# Patient Record
Sex: FEMALE | Race: WHITE | Employment: PART TIME | URBAN - METROPOLITAN AREA
[De-identification: names, ages, dates, MRNs, and addresses within clinical notes are randomized per-mention and may not be internally consistent; named-entity substitution may affect disease eponyms.]

---

## 2019-10-19 ENCOUNTER — TELEPHONE (OUTPATIENT)
Dept: FAMILY MEDICINE CLINIC | Age: 44
End: 2019-10-19

## 2019-10-19 ENCOUNTER — OFFICE VISIT (OUTPATIENT)
Dept: FAMILY MEDICINE CLINIC | Age: 44
End: 2019-10-19
Payer: MEDICAID

## 2019-10-19 VITALS
RESPIRATION RATE: 16 BRPM | SYSTOLIC BLOOD PRESSURE: 100 MMHG | TEMPERATURE: 97.7 F | DIASTOLIC BLOOD PRESSURE: 62 MMHG | OXYGEN SATURATION: 99 % | HEIGHT: 69 IN | HEART RATE: 84 BPM | BODY MASS INDEX: 21.66 KG/M2 | WEIGHT: 146.2 LBS

## 2019-10-19 DIAGNOSIS — T36.95XA ANTIBIOTIC-INDUCED YEAST INFECTION: ICD-10-CM

## 2019-10-19 DIAGNOSIS — B37.9 ANTIBIOTIC-INDUCED YEAST INFECTION: ICD-10-CM

## 2019-10-19 DIAGNOSIS — J22 ACUTE LOWER RESPIRATORY TRACT INFECTION: Primary | ICD-10-CM

## 2019-10-19 PROCEDURE — G8484 FLU IMMUNIZE NO ADMIN: HCPCS | Performed by: NURSE PRACTITIONER

## 2019-10-19 PROCEDURE — 99213 OFFICE O/P EST LOW 20 MIN: CPT | Performed by: NURSE PRACTITIONER

## 2019-10-19 PROCEDURE — 4004F PT TOBACCO SCREEN RCVD TLK: CPT | Performed by: NURSE PRACTITIONER

## 2019-10-19 PROCEDURE — G8427 DOCREV CUR MEDS BY ELIG CLIN: HCPCS | Performed by: NURSE PRACTITIONER

## 2019-10-19 PROCEDURE — G8420 CALC BMI NORM PARAMETERS: HCPCS | Performed by: NURSE PRACTITIONER

## 2019-10-19 RX ORDER — DEXTROMETHORPHAN HYDROBROMIDE AND PROMETHAZINE HYDROCHLORIDE 15; 6.25 MG/5ML; MG/5ML
5 SOLUTION ORAL 4 TIMES DAILY
Qty: 140 ML | Refills: 0 | Status: SHIPPED | OUTPATIENT
Start: 2019-10-19 | End: 2020-11-16 | Stop reason: SDUPTHER

## 2019-10-19 RX ORDER — AZITHROMYCIN 250 MG/1
TABLET, FILM COATED ORAL
Qty: 6 TABLET | Refills: 0 | Status: SHIPPED | OUTPATIENT
Start: 2019-10-19 | End: 2019-10-24

## 2019-10-19 RX ORDER — FLUCONAZOLE 150 MG/1
TABLET ORAL
Qty: 1 TABLET | Refills: 0 | Status: SHIPPED | OUTPATIENT
Start: 2019-10-19 | End: 2020-06-25 | Stop reason: CLARIF

## 2019-10-19 RX ORDER — ALBUTEROL SULFATE 90 UG/1
2 AEROSOL, METERED RESPIRATORY (INHALATION) EVERY 6 HOURS PRN
Qty: 2 INHALER | Refills: 0 | Status: SHIPPED | OUTPATIENT
Start: 2019-10-19 | End: 2020-06-25 | Stop reason: CLARIF

## 2019-10-19 RX ORDER — PREDNISONE 10 MG/1
TABLET ORAL
Qty: 30 TABLET | Refills: 0 | Status: SHIPPED | OUTPATIENT
Start: 2019-10-19 | End: 2019-10-31

## 2019-10-19 ASSESSMENT — ENCOUNTER SYMPTOMS
COUGH: 1
WHEEZING: 1
SINUS PRESSURE: 1
ABDOMINAL PAIN: 0
TROUBLE SWALLOWING: 0

## 2019-10-21 ENCOUNTER — TELEPHONE (OUTPATIENT)
Dept: FAMILY MEDICINE CLINIC | Age: 44
End: 2019-10-21

## 2019-10-21 DIAGNOSIS — J22 ACUTE LOWER RESPIRATORY TRACT INFECTION: Primary | ICD-10-CM

## 2019-10-21 RX ORDER — BENZONATATE 100 MG/1
100-200 CAPSULE ORAL 3 TIMES DAILY PRN
Qty: 30 CAPSULE | Refills: 0 | Status: SHIPPED | OUTPATIENT
Start: 2019-10-21 | End: 2019-10-26

## 2020-06-15 ENCOUNTER — HOSPITAL ENCOUNTER (EMERGENCY)
Age: 45
Discharge: HOME | End: 2020-06-15
Payer: MEDICAID

## 2020-06-15 VITALS
TEMPERATURE: 99 F | SYSTOLIC BLOOD PRESSURE: 138 MMHG | DIASTOLIC BLOOD PRESSURE: 90 MMHG | WEIGHT: 165 LBS | OXYGEN SATURATION: 98 % | BODY MASS INDEX: 23.62 KG/M2 | HEIGHT: 70 IN | RESPIRATION RATE: 16 BRPM | HEART RATE: 93 BPM

## 2020-06-15 LAB
ACETAMINOPHEN LEVEL: <5 UG/ML (ref 10–30)
ALBUMIN SERPL-MCNC: 4.7 G/DL (ref 3.5–4.6)
ALP BLD-CCNC: 41 U/L (ref 40–130)
ALT SERPL-CCNC: 19 U/L (ref 0–33)
AMPHETAMINE SCREEN, URINE: NORMAL
ANION GAP SERPL CALCULATED.3IONS-SCNC: 13 MEQ/L (ref 9–15)
AST SERPL-CCNC: 27 U/L (ref 0–35)
BARBITURATE SCREEN URINE: NORMAL
BASOPHILS ABSOLUTE: 0.1 K/UL (ref 0–0.2)
BASOPHILS RELATIVE PERCENT: 1.4 %
BENZODIAZEPINE SCREEN, URINE: NORMAL
BILIRUB SERPL-MCNC: <0.2 MG/DL (ref 0.2–0.7)
BILIRUBIN URINE: NEGATIVE
BLOOD, URINE: NEGATIVE
BUN BLDV-MCNC: 14 MG/DL (ref 6–20)
CALCIUM SERPL-MCNC: 9.1 MG/DL (ref 8.5–9.9)
CANNABINOID SCREEN URINE: NORMAL
CHLORIDE BLD-SCNC: 106 MEQ/L (ref 95–107)
CHOLESTEROL, TOTAL: 280 MG/DL (ref 0–199)
CLARITY: CLEAR
CO2: 21 MEQ/L (ref 20–31)
COCAINE METABOLITE SCREEN URINE: NORMAL
COLOR: YELLOW
CREAT SERPL-MCNC: 0.54 MG/DL (ref 0.5–0.9)
EOSINOPHILS ABSOLUTE: 0.3 K/UL (ref 0–0.7)
EOSINOPHILS RELATIVE PERCENT: 3.5 %
ETHANOL PERCENT: 0.24 G/DL
ETHANOL: 272 MG/DL (ref 0–0.08)
GFR AFRICAN AMERICAN: >60
GFR NON-AFRICAN AMERICAN: >60
GLOBULIN: 2.3 G/DL (ref 2.3–3.5)
GLUCOSE BLD-MCNC: 92 MG/DL (ref 70–99)
GLUCOSE URINE: NEGATIVE MG/DL
HCG(URINE) PREGNANCY TEST: NEGATIVE
HCT VFR BLD CALC: 41.8 % (ref 37–47)
HDLC SERPL-MCNC: 69 MG/DL (ref 40–59)
HEMOGLOBIN: 14.1 G/DL (ref 12–16)
KETONES, URINE: NEGATIVE MG/DL
LDL CHOLESTEROL CALCULATED: ABNORMAL MG/DL (ref 0–129)
LEUKOCYTE ESTERASE, URINE: NEGATIVE
LYMPHOCYTES ABSOLUTE: 2.9 K/UL (ref 1–4.8)
LYMPHOCYTES RELATIVE PERCENT: 37.1 %
Lab: NORMAL
MCH RBC QN AUTO: 31.3 PG (ref 27–31.3)
MCHC RBC AUTO-ENTMCNC: 33.6 % (ref 33–37)
MCV RBC AUTO: 93.2 FL (ref 82–100)
METHADONE SCREEN, URINE: NORMAL
MONOCYTES ABSOLUTE: 0.6 K/UL (ref 0.2–0.8)
MONOCYTES RELATIVE PERCENT: 7.4 %
NEUTROPHILS ABSOLUTE: 3.9 K/UL (ref 1.4–6.5)
NEUTROPHILS RELATIVE PERCENT: 50.6 %
NITRITE, URINE: NEGATIVE
OPIATE SCREEN URINE: NORMAL
OXYCODONE URINE: NORMAL
PDW BLD-RTO: 13.4 % (ref 11.5–14.5)
PH UA: 5 (ref 5–9)
PHENCYCLIDINE SCREEN URINE: NORMAL
PLATELET # BLD: 241 K/UL (ref 130–400)
POTASSIUM SERPL-SCNC: 4 MEQ/L (ref 3.4–4.9)
PROPOXYPHENE SCREEN: NORMAL
PROTEIN UA: NEGATIVE MG/DL
RBC # BLD: 4.49 M/UL (ref 4.2–5.4)
SALICYLATE, SERUM: <0.3 MG/DL (ref 15–30)
SODIUM BLD-SCNC: 140 MEQ/L (ref 135–144)
SPECIFIC GRAVITY UA: 1.01 (ref 1–1.03)
TOTAL CK: 111 U/L (ref 0–170)
TOTAL PROTEIN: 7 G/DL (ref 6.3–8)
TRIGL SERPL-MCNC: 576 MG/DL (ref 0–150)
TSH SERPL DL<=0.05 MIU/L-ACNC: 2.65 UIU/ML (ref 0.44–3.86)
URINE REFLEX TO CULTURE: NORMAL
UROBILINOGEN, URINE: 0.2 E.U./DL
WBC # BLD: 7.8 K/UL (ref 4.8–10.8)

## 2020-06-15 PROCEDURE — 84703 CHORIONIC GONADOTROPIN ASSAY: CPT

## 2020-06-15 PROCEDURE — 82550 ASSAY OF CK (CPK): CPT

## 2020-06-15 PROCEDURE — 84443 ASSAY THYROID STIM HORMONE: CPT

## 2020-06-15 PROCEDURE — G0480 DRUG TEST DEF 1-7 CLASSES: HCPCS

## 2020-06-15 PROCEDURE — 80061 LIPID PANEL: CPT

## 2020-06-15 PROCEDURE — 85025 COMPLETE CBC W/AUTO DIFF WBC: CPT

## 2020-06-15 PROCEDURE — 36415 COLL VENOUS BLD VENIPUNCTURE: CPT

## 2020-06-15 PROCEDURE — 81003 URINALYSIS AUTO W/O SCOPE: CPT

## 2020-06-15 PROCEDURE — 80307 DRUG TEST PRSMV CHEM ANLYZR: CPT

## 2020-06-15 PROCEDURE — 99284 EMERGENCY DEPT VISIT MOD MDM: CPT

## 2020-06-15 PROCEDURE — 80053 COMPREHEN METABOLIC PANEL: CPT

## 2020-06-15 RX ORDER — ESCITALOPRAM OXALATE 10 MG/1
10 TABLET ORAL DAILY
COMMUNITY
Start: 2020-06-03 | End: 2020-06-25 | Stop reason: CLARIF

## 2020-06-15 RX ORDER — HYDROXYZINE PAMOATE 50 MG/1
50 CAPSULE ORAL 3 TIMES DAILY PRN
COMMUNITY
Start: 2020-06-13 | End: 2022-06-22 | Stop reason: SDUPTHER

## 2020-06-15 ASSESSMENT — ENCOUNTER SYMPTOMS
ABDOMINAL PAIN: 0
SORE THROAT: 0
BLOOD IN STOOL: 0
COUGH: 0
SHORTNESS OF BREATH: 0
VOMITING: 0
DIARRHEA: 0
RHINORRHEA: 0
NAUSEA: 0
COLOR CHANGE: 0

## 2020-06-16 NOTE — ED PROVIDER NOTES
[facility] ED  eMERGENCY dEPARTMENT eNCOUnter      Pt Name: Collin Kenyon  MRN: [id number]  Grantgflaila 1975  Date of evaluation: 6/15/2020  Provider: MARY Adair      HISTORY OF PRESENT ILLNESS    Collin Kenyon is a 38 y.o. female with PMHx of depression presents to the emergency department with depression. Pts son called ambulance today d/t depression and ETOH intoxication of patient. Patient states she has been depressed and saw her psychiatrist over the weekend and was given Depakote that she has to  at the store. Patient states she does have plans to help her with her alcohol. Son also states there is been no suicidal comments and patient denies any suicidal ideation. She denies homicidal ideation or hallucinations. HPI    Nursing Notes were reviewed. REVIEW OF SYSTEMS       Review of Systems   Constitutional: Negative for appetite change, chills and fever. HENT: Negative for congestion, rhinorrhea and sore throat. Respiratory: Negative for cough and shortness of breath. Cardiovascular: Negative for chest pain. Gastrointestinal: Negative for abdominal pain, blood in stool, diarrhea, nausea and vomiting. Genitourinary: Negative for difficulty urinating. Musculoskeletal: Negative for neck stiffness. Skin: Negative for color change and rash. Neurological: Negative for dizziness, syncope, weakness, light-headedness, numbness and headaches. All other systems reviewed and are negative.             PAST MEDICAL HISTORY     Past Medical History:   Diagnosis Date    Depression          SURGICAL HISTORY       Past Surgical History:   Procedure Laterality Date    BREAST ENHANCEMENT SURGERY           CURRENT MEDICATIONS       Previous Medications    ALBUTEROL SULFATE  (90 BASE) MCG/ACT INHALER    Inhale 2 puffs into the lungs every 6 hours as needed for Wheezing or Shortness of Breath    ESCITALOPRAM (LEXAPRO) 10 MG TABLET    Take 10 mg by mouth daily normal limits   LIPID PANEL - Abnormal; Notable for the following components:    Cholesterol, Total 280 (*)     Triglycerides 576 (*)     HDL 69 (*)     All other components within normal limits   SALICYLATE LEVEL - Abnormal; Notable for the following components:    Salicylate, Serum <9.5 (*)     All other components within normal limits   CBC WITH AUTO DIFFERENTIAL   CK   ETHANOL   PREGNANCY, URINE   TSH WITHOUT REFLEX   URINE RT REFLEX TO CULTURE   URINE DRUG SCREEN       All other labs were within normal range or not returned as of this dictation. EMERGENCY DEPARTMENT COURSE and DIFFERENTIAL DIAGNOSIS/MDM:   Vitals:    Vitals:    06/15/20 1941   BP: (!) 138/90   Pulse: 93   Resp: 16   Temp: 99 °F (37.2 °C)   TempSrc: Oral   SpO2: 98%   Weight: 165 lb (74.8 kg)   Height: 5' 10\" (1.778 m)         MDM    Patient is intoxicated. However there has been no suicidal thoughts. Patient is medically cleared. Patient was reassessed and again states that she is not suicidal concerns and has no suicidal thoughts. She states she can have her children  her Depakote for her at the pharmacy. Standard anticipatory guidance given to patient upon discharge. Have given them a specific time frame in which to follow-up and who to follow-up with. I have also advised them that they should return to the emergency department if they get worse, or not getting better or develop any new or concerning symptoms. Patient demonstrates understanding. CRITICAL CARE TIME   Total Critical Caretime was 0 minutes, excluding separately reportable procedures. There was a high probability of clinically significant/life threatening deterioration in the patient's condition which required my urgent intervention. Procedures    FINAL IMPRESSION      1. Depression, unspecified depression type    2.  Substance induced mood disorder [facility])          DISPOSITION/PLAN   DISPOSITION Decision To Discharge 06/15/2020 09:36:09 PM      PATIENT REFERRED TO:  [facility])  Philippe Cory, [place], [address]  [phone]          DISCHARGE MEDICATIONS:  New Prescriptions    No medications on file          (Please notethat portions of this note were completed with a voice recognition program.  Efforts were made to edit the dictations but occasionally words are mis-transcribed. )    MARY Raygoza (electronically signed)  Emergency Physician Assistant         Gisela Baltazarma  06/15/20 3342

## 2020-06-25 ENCOUNTER — OFFICE VISIT (OUTPATIENT)
Dept: FAMILY MEDICINE CLINIC | Age: 45
End: 2020-06-25
Payer: MEDICAID

## 2020-06-25 VITALS
SYSTOLIC BLOOD PRESSURE: 110 MMHG | HEART RATE: 78 BPM | WEIGHT: 168 LBS | BODY MASS INDEX: 24.88 KG/M2 | RESPIRATION RATE: 12 BRPM | HEIGHT: 69 IN | DIASTOLIC BLOOD PRESSURE: 70 MMHG

## 2020-06-25 PROCEDURE — G8420 CALC BMI NORM PARAMETERS: HCPCS | Performed by: NURSE PRACTITIONER

## 2020-06-25 PROCEDURE — 4004F PT TOBACCO SCREEN RCVD TLK: CPT | Performed by: NURSE PRACTITIONER

## 2020-06-25 PROCEDURE — 99213 OFFICE O/P EST LOW 20 MIN: CPT | Performed by: NURSE PRACTITIONER

## 2020-06-25 PROCEDURE — G8427 DOCREV CUR MEDS BY ELIG CLIN: HCPCS | Performed by: NURSE PRACTITIONER

## 2020-06-25 RX ORDER — MEDROXYPROGESTERONE ACETATE 10 MG/1
10 TABLET ORAL DAILY
Qty: 10 TABLET | Refills: 0 | Status: SHIPPED | OUTPATIENT
Start: 2020-06-25 | End: 2022-09-26

## 2020-06-25 RX ORDER — DIVALPROEX SODIUM 250 MG/1
250 TABLET, EXTENDED RELEASE ORAL DAILY
COMMUNITY
End: 2022-09-26

## 2020-06-26 ENCOUNTER — TELEPHONE (OUTPATIENT)
Dept: FAMILY MEDICINE CLINIC | Age: 45
End: 2020-06-26

## 2020-06-26 DIAGNOSIS — N93.8 DUB (DYSFUNCTIONAL UTERINE BLEEDING): ICD-10-CM

## 2020-06-26 DIAGNOSIS — Z11.3 SCREENING EXAMINATION FOR STD (SEXUALLY TRANSMITTED DISEASE): ICD-10-CM

## 2020-06-26 DIAGNOSIS — Z30.09 FAMILY PLANNING COUNSELING: ICD-10-CM

## 2020-06-26 DIAGNOSIS — Z30.09 COUNSELING FOR BIRTH CONTROL, INTRAUTERINE DEVICE: ICD-10-CM

## 2020-06-26 LAB
BILIRUBIN URINE: NEGATIVE
BLOOD, URINE: NEGATIVE
CLARITY: CLEAR
COLOR: YELLOW
GLUCOSE URINE: NEGATIVE MG/DL
HCG(URINE) PREGNANCY TEST: NEGATIVE
KETONES, URINE: NEGATIVE MG/DL
LEUKOCYTE ESTERASE, URINE: NEGATIVE
NITRITE, URINE: NEGATIVE
PH UA: 8 (ref 5–9)
PROTEIN UA: NEGATIVE MG/DL
SPECIFIC GRAVITY UA: 1.01 (ref 1–1.03)
URINE REFLEX TO CULTURE: NORMAL
UROBILINOGEN, URINE: 0.2 E.U./DL

## 2020-06-28 PROBLEM — F31.81 BIPOLAR II DISORDER (HCC): Status: ACTIVE | Noted: 2020-06-28

## 2020-06-28 ASSESSMENT — ENCOUNTER SYMPTOMS
SINUS PRESSURE: 1
WHEEZING: 1
COUGH: 1
TROUBLE SWALLOWING: 0
ABDOMINAL PAIN: 0

## 2020-06-30 LAB — C. TRACHOMATIS DNA ,URINE: NEGATIVE

## 2020-07-13 ENCOUNTER — PROCEDURE VISIT (OUTPATIENT)
Dept: FAMILY MEDICINE CLINIC | Age: 45
End: 2020-07-13
Payer: MEDICAID

## 2020-07-13 VITALS
BODY MASS INDEX: 25.62 KG/M2 | HEIGHT: 69 IN | SYSTOLIC BLOOD PRESSURE: 126 MMHG | WEIGHT: 173 LBS | HEART RATE: 88 BPM | RESPIRATION RATE: 14 BRPM | DIASTOLIC BLOOD PRESSURE: 78 MMHG

## 2020-07-13 DIAGNOSIS — Z01.419 PAP SMEAR, AS PART OF ROUTINE GYNECOLOGICAL EXAMINATION: ICD-10-CM

## 2020-07-13 PROCEDURE — S4989 CONTRACEPT IUD: HCPCS | Performed by: NURSE PRACTITIONER

## 2020-07-13 PROCEDURE — 58300 INSERT INTRAUTERINE DEVICE: CPT | Performed by: NURSE PRACTITIONER

## 2020-07-13 PROCEDURE — 99396 PREV VISIT EST AGE 40-64: CPT | Performed by: NURSE PRACTITIONER

## 2020-07-13 ASSESSMENT — ENCOUNTER SYMPTOMS
CONSTIPATION: 0
EYES NEGATIVE: 1
VOICE CHANGE: 0
SHORTNESS OF BREATH: 0
COLOR CHANGE: 0
ABDOMINAL PAIN: 0
BLOOD IN STOOL: 0
DIARRHEA: 0
RECTAL PAIN: 0
RESPIRATORY NEGATIVE: 1
ALLERGIC/IMMUNOLOGIC NEGATIVE: 1
TROUBLE SWALLOWING: 0
GASTROINTESTINAL NEGATIVE: 1
ANAL BLEEDING: 0

## 2020-07-13 ASSESSMENT — PATIENT HEALTH QUESTIONNAIRE - PHQ9
1. LITTLE INTEREST OR PLEASURE IN DOING THINGS: 0
2. FEELING DOWN, DEPRESSED OR HOPELESS: 0
SUM OF ALL RESPONSES TO PHQ QUESTIONS 1-9: 0
SUM OF ALL RESPONSES TO PHQ QUESTIONS 1-9: 0
SUM OF ALL RESPONSES TO PHQ9 QUESTIONS 1 & 2: 0

## 2020-07-13 NOTE — PROGRESS NOTES
2020    Dayami Sultana (:  1975) is a 38 y.o. female, here for a preventive medicine evaluation. Patient Active Problem List   Diagnosis    B[facility] II disorder [facility]       Review of Systems   Constitutional: Negative. Negative for activity change, appetite change, fatigue and unexpected weight change. HENT: Negative. Negative for dental problem, nosebleeds, trouble swallowing and voice change. Eyes: Negative. Negative for visual disturbance. Respiratory: Negative. Negative for shortness of breath. Cardiovascular: Negative. Negative for chest pain, palpitations and leg swelling. Gastrointestinal: Negative. Negative for abdominal pain, anal bleeding, blood in stool, constipation, diarrhea and rectal pain. Endocrine: Negative. Negative for cold intolerance, heat intolerance, polydipsia, polyphagia and polyuria. Genitourinary: Positive for menstrual problem and vaginal bleeding. Negative for decreased urine volume, difficulty urinating, dyspareunia, dysuria, enuresis, flank pain, frequency, genital sores, hematuria, pelvic pain, urgency, vaginal discharge and vaginal pain. Musculoskeletal: Negative. Skin: Negative. Negative for color change and rash. Allergic/Immunologic: Negative. Neurological: Negative. Negative for dizziness, syncope, weakness and headaches. Hematological: Negative. Negative for adenopathy. Does not bruise/bleed easily. Psychiatric/Behavioral: Negative. Negative for dysphoric mood and sleep disturbance. The patient is not nervous/anxious. Prior to Visit Medications    Medication Sig Taking?  Authorizing Provider   divalproex (DEPAKOTE ER) 250 MG extended release tablet Take 250 mg by mouth daily  Historical Provider, MD   medroxyPROGESTERone (PROVERA) 10 MG tablet Take 1 tablet by mouth daily  NATASHA Sotelo CNP   levonorgestrel (MIRENA) IUD 52 mg 1 each by Intrauterine route once for 1 dose  NATASHA Sotelo CNP hydrOXYzine (VISTARIL) 50 MG capsule Take 50 mg by mouth 3 times daily as needed  Historical Provider, MD        No Known Allergies    Past Medical History:   Diagnosis Date    Depression        Past Surgical History:   Procedure Laterality Date    BREAST ENHANCEMENT SURGERY         Social History     Socioeconomic History    Marital status: Single     Spouse name: Not on file    Number of children: Not on file    Years of education: Not on file    Highest education level: Not on file   Occupational History    Not on file   Social Needs    Financial resource strain: Not on file    Food insecurity     Worry: Not on file     Inability: Not on file    Transportation needs     Medical: Not on file     Non-medical: Not on file   Tobacco Use    Smoking status: Current Every Day Smoker     Packs/day: 1.00     Types: Cigarettes    Smokeless tobacco: Never Used   Substance and Sexual Activity    Alcohol use: Yes     Alcohol/week: 4.0 standard drinks     Types: 4 Glasses of wine per week    Drug use: No    Sexual activity: Not on file   Lifestyle    Physical activity     Days per week: Not on file     Minutes per session: Not on file    Stress: Not on file   Relationships    Social connections     Talks on phone: Not on file     Gets together: Not on file     Attends Yazdanism service: Not on file     Active member of club or organization: Not on file     Attends meetings of clubs or organizations: Not on file     Relationship status: Not on file    Intimate partner violence     Fear of current or ex partner: Not on file     Emotionally abused: Not on file     Physically abused: Not on file     Forced sexual activity: Not on file   Other Topics Concern    Not on file   Social History Narrative    Not on file        No family history on file.     ADVANCE DIRECTIVE: N, Not Received    Vitals:    07/13/20 0811   BP: 126/78   Pulse: 88   Resp: 14   Weight: 173 lb (78.5 kg)   Height: 5' 9\" (1.753 m) Estimated body mass index is 25.55 kg/m² as calculated from the following:    Height as of this encounter: 5' 9\" (1.753 m). Weight as of this encounter: 173 lb (78.5 kg). Physical Exam  Constitutional:       Appearance: She is well-developed. HENT:      Head: Normocephalic. Eyes:      Conjunctiva/sclera: Conjunctivae normal.      Pupils: Pupils are equal, round, and reactive to light. Cardiovascular:      Rate and Rhythm: Normal rate. Pulmonary:      Effort: Pulmonary effort is normal.   Abdominal:      General: Bowel sounds are normal. There is no distension. Palpations: Abdomen is soft. There is no mass. Tenderness: There is no abdominal tenderness. There is no guarding. Genitourinary:     Labia:         Right: No rash, tenderness, lesion or injury. Left: No rash, tenderness, lesion or injury. Vagina: Normal. No signs of injury and foreign body. No vaginal discharge, erythema, tenderness or bleeding. Cervix: No cervical motion tenderness, discharge or friability. Uterus: Not deviated, not enlarged, not fixed and not tender. Adnexa:         Right: No mass, tenderness or fullness. Left: No mass, tenderness or fullness. Rectum: No external hemorrhoid. Skin:     General: Skin is warm and dry. Neurological:      Mental Status: She is alert and oriented to person, place, and time. Psychiatric:         Thought Content: Thought content normal.     IUD Insertion Procedure Note    Pre-operative Diagnosis:    Diagnosis Orders   1. Pap smear, as part of routine gynecological examination  Pap Smear   2. Menometrorrhagia  levonorgestrel (MIRENA) IUD 52 mg 1 each   3. Encounter for insertion of mirena IUD  levonorgestrel (MIRENA) IUD 52 mg 1 each     Indications: undesired fertility    Procedure Details    Urine pregnancy test was not done.   The risks (including infection, bleeding, pain, and uterine perforation) and benefits of the procedure were Procedures    Pap Smear     Patient History:    Patient's last menstrual period was 2020. OBGYN Status: Having periods  Past Surgical History:  No date: BREAST ENHANCEMENT SURGERY  Medications/Contraceptives Affecting Cytology     Progestin Contraceptives - IUD Disp Start End     levonorgestrel (MIRENA) IUD 52 mg    1 each 2020    Si each by Intrauterine route once for 1 dose    Class: Print    Route: Intrauterine    Cosign for Ordering: Accepted by NATASHA Viera CNP on   2020 12:02 AM        Social History    Tobacco Use      Smoking status: Current Every Day Smoker        Packs/day: 1.00        Types: Cigarettes      Smokeless tobacco: Never Used       Standing Status:   Future     Standing Expiration Date:   2021     Order Specific Question:   Collection Type     Answer:   SurePath     Order Specific Question:   Prior Abnormal Pap Test     Answer:   No     Order Specific Question:   Screening or Diagnostic     Answer:   Screening     Order Specific Question:   HPV Requested? Answer:   N/A     Order Specific Question:   High Risk Patient     Answer:   N/A     Orders Placed This Encounter   Medications    levonorgestrel (MIRENA) IUD 52 mg 1 each     There are no discontinued medications. No follow-ups on file. Reviewed with the patient: current clinical status, medications, activities and diet. Side effects, adverse effects of the medication prescribed today, as well as treatment plan/ rationale and result expectations have been discussed with the patient who expresses understanding and desires to proceed. Close follow up to evaluate treatment results and for coordination of care. I have reviewed the patient's medical history in detail and updated the computerized patient record.     NATASHA Cardenas CNP

## 2020-07-15 PROBLEM — F10.20 ALCOHOL USE DISORDER, MODERATE, DEPENDENCE (HCC): Status: ACTIVE | Noted: 2020-06-13

## 2020-07-15 PROBLEM — F41.1 GAD (GENERALIZED ANXIETY DISORDER): Status: ACTIVE | Noted: 2019-12-28

## 2020-07-17 ENCOUNTER — HOSPITAL ENCOUNTER (OUTPATIENT)
Dept: ULTRASOUND IMAGING | Age: 45
Discharge: HOME | End: 2020-07-19
Payer: MEDICAID

## 2020-07-17 PROCEDURE — 76830 TRANSVAGINAL US NON-OB: CPT

## 2020-07-17 PROCEDURE — 76856 US EXAM PELVIC COMPLETE: CPT

## 2020-08-11 ENCOUNTER — TELEPHONE (OUTPATIENT)
Dept: FAMILY MEDICINE CLINIC | Age: 45
End: 2020-08-11

## 2020-08-11 NOTE — TELEPHONE ENCOUNTER
PT called, stated had VM to call and speak with MA, didn't see any recent notes, please advise        [id number] Emi Wilson, thank you

## 2020-08-12 NOTE — TELEPHONE ENCOUNTER
Pt aware of pap result, she was gisele upset but will discuss with celina at her next appt on 8/27/20

## 2020-09-10 ENCOUNTER — OFFICE VISIT (OUTPATIENT)
Dept: FAMILY MEDICINE CLINIC | Age: 45
End: 2020-09-10
Payer: MEDICAID

## 2020-09-10 VITALS
HEIGHT: 69 IN | HEART RATE: 78 BPM | DIASTOLIC BLOOD PRESSURE: 78 MMHG | BODY MASS INDEX: 25.18 KG/M2 | RESPIRATION RATE: 12 BRPM | WEIGHT: 170 LBS | SYSTOLIC BLOOD PRESSURE: 118 MMHG

## 2020-09-10 DIAGNOSIS — R87.620 ATYPICAL SQUAMOUS CELL CHANGES OF UNDETERMINED SIGNIFICANCE (ASCUS) ON VAGINAL CYTOLOGY: ICD-10-CM

## 2020-09-10 DIAGNOSIS — R87.619 ABNORMAL CERVICAL PAPANICOLAOU SMEAR, UNSPECIFIED ABNORMAL PAP FINDING: ICD-10-CM

## 2020-09-10 PROCEDURE — G8427 DOCREV CUR MEDS BY ELIG CLIN: HCPCS | Performed by: NURSE PRACTITIONER

## 2020-09-10 PROCEDURE — G8419 CALC BMI OUT NRM PARAM NOF/U: HCPCS | Performed by: NURSE PRACTITIONER

## 2020-09-10 PROCEDURE — 99215 OFFICE O/P EST HI 40 MIN: CPT | Performed by: NURSE PRACTITIONER

## 2020-09-10 PROCEDURE — 4004F PT TOBACCO SCREEN RCVD TLK: CPT | Performed by: NURSE PRACTITIONER

## 2020-09-10 ASSESSMENT — ENCOUNTER SYMPTOMS
TROUBLE SWALLOWING: 0
VOICE CHANGE: 0
RESPIRATORY NEGATIVE: 1
COLOR CHANGE: 0
DIARRHEA: 0
EYES NEGATIVE: 1
SHORTNESS OF BREATH: 0
CONSTIPATION: 0
RECTAL PAIN: 0
GASTROINTESTINAL NEGATIVE: 1
BLOOD IN STOOL: 0
ABDOMINAL PAIN: 0
ALLERGIC/IMMUNOLOGIC NEGATIVE: 1
ANAL BLEEDING: 0

## 2020-09-10 NOTE — PROGRESS NOTES
Subjective  Raiza Bowman, 38 y.o. female presents today with:  Chief Complaint   Patient presents with    Contraception     6 wks f/u mirena check        HPI  Here for IUD string check. Also positive for ASCUS. Will repeat Pap today with HPV. Review of Systems   Constitutional: Negative. Negative for activity change, appetite change, fatigue and unexpected weight change. HENT: Negative. Negative for dental problem, nosebleeds, trouble swallowing and voice change. Eyes: Negative. Negative for visual disturbance. Respiratory: Negative. Negative for shortness of breath. Cardiovascular: Negative. Negative for chest pain, palpitations and leg swelling. Gastrointestinal: Negative. Negative for abdominal pain, anal bleeding, blood in stool, constipation, diarrhea and rectal pain. Endocrine: Negative. Negative for cold intolerance, heat intolerance, polydipsia, polyphagia and polyuria. Genitourinary: Negative. Musculoskeletal: Negative. Skin: Negative. Negative for color change and rash. Allergic/Immunologic: Negative. Neurological: Negative. Negative for dizziness, syncope, weakness and headaches. Hematological: Negative. Negative for adenopathy. Does not bruise/bleed easily. Psychiatric/Behavioral: Negative. Negative for dysphoric mood and sleep disturbance. The patient is not nervous/anxious.         Past Medical History:   Diagnosis Date    Depression      Past Surgical History:   Procedure Laterality Date    BREAST ENHANCEMENT SURGERY       Social History     Socioeconomic History    Marital status: Single     Spouse name: Not on file    Number of children: Not on file    Years of education: Not on file    Highest education level: Not on file   Occupational History    Not on file   Social Needs    Financial resource strain: Not on file    Food insecurity     Worry: Not on file     Inability: Not on file    Transportation needs     Medical: Not on file Non-medical: Not on file   Tobacco Use    Smoking status: Current Every Day Smoker     Packs/day: 1.00     Types: Cigarettes    Smokeless tobacco: Never Used   Substance and Sexual Activity    Alcohol use: Yes     Alcohol/week: 4.0 standard drinks     Types: 4 Glasses of wine per week    Drug use: No    Sexual activity: Not on file   Lifestyle    Physical activity     Days per week: Not on file     Minutes per session: Not on file    Stress: Not on file   Relationships    Social connections     Talks on phone: Not on file     Gets together: Not on file     Attends Pentecostalism service: Not on file     Active member of club or organization: Not on file     Attends meetings of clubs or organizations: Not on file     Relationship status: Not on file    Intimate partner violence     Fear of current or ex partner: Not on file     Emotionally abused: Not on file     Physically abused: Not on file     Forced sexual activity: Not on file   Other Topics Concern    Not on file   Social History Narrative    Not on file     No family history on file. No Known Allergies  Current Outpatient Medications   Medication Sig Dispense Refill    divalproex (DEPAKOTE ER) 250 MG extended release tablet Take 250 mg by mouth daily      medroxyPROGESTERone (PROVERA) 10 MG tablet Take 1 tablet by mouth daily 10 tablet 0    levonorgestrel (MIRENA) IUD 52 mg 1 each by Intrauterine route once for 1 dose 1 each 0    hydrOXYzine (VISTARIL) 50 MG capsule Take 50 mg by mouth 3 times daily as needed       Current Facility-Administered Medications   Medication Dose Route Frequency Provider Last Rate Last Dose    levonorgestrel (MIRENA) IUD 52 mg 1 each  1 each Intrauterine Once Ples FERNANDEZ RandallN - CNP   1 each at 07/13/20 6975     PMH, Surgical Hx, Family Hx, and Social Hx reviewed and updated. Health Maintenance reviewed.     Objective    Vitals:    09/10/20 0935   BP: 118/78   Pulse: 78   Resp: 12   Weight: 170 lb (77.1 kg) Height: 5' 9\" (1.753 m)       Physical Exam  Constitutional:       Appearance: She is well-developed. HENT:      Head: Normocephalic. Eyes:      Conjunctiva/sclera: Conjunctivae normal.      Pupils: Pupils are equal, round, and reactive to light. Cardiovascular:      Rate and Rhythm: Normal rate. Pulmonary:      Effort: Pulmonary effort is normal.   Abdominal:      General: Bowel sounds are normal. There is no distension. Palpations: Abdomen is soft. There is no mass. Tenderness: There is no abdominal tenderness. There is no guarding. Genitourinary:     Labia:         Right: No rash, tenderness, lesion or injury. Left: No rash, tenderness, lesion or injury. Urethra: No prolapse, urethral pain, urethral swelling or urethral lesion. Vagina: Normal. No signs of injury and foreign body. No vaginal discharge, erythema, tenderness or bleeding. Cervix: Lesion present. No cervical motion tenderness, discharge, friability, erythema, cervical bleeding or eversion. Uterus: Not deviated, not enlarged, not fixed, not tender and no uterine prolapse. Adnexa:         Right: No mass, tenderness or fullness. Left: No mass, tenderness or fullness. Rectum: No external hemorrhoid. Skin:     General: Skin is warm and dry. Capillary Refill: Capillary refill takes less than 2 seconds. Neurological:      Mental Status: She is alert and oriented to person, place, and time. Psychiatric:         Thought Content: Thought content normal.       Assessment & Plan   [facility] was seen today for contraception. Diagnoses and all orders for this visit:    Atypical squamous cell changes of undetermined significance (ASCUS) on vaginal cytology  -     Pap Smear; Future    Cyst of right ovary  -     US PELVIS COMPLETE; Future  -     US NON OB TRANSVAGINAL;  Future    Abnormal cervical Papanicolaou smear, unspecified abnormal pap finding  -     Pap Smear; Future      Orders Placed This Encounter   Procedures    US PELVIS COMPLETE     Standing Status:   Future     Standing Expiration Date:   9/10/2021    US NON OB TRANSVAGINAL     Standing Status:   Future     Standing Expiration Date:   9/10/2021    Pap Smear     Patient History:    Patient's last menstrual period was 2020. OBGYN Status: Having periods  Past Surgical History:  No date: BREAST ENHANCEMENT SURGERY  Medications/Contraceptives Affecting Cytology     Progestin Contraceptives - IUD Disp Start End     levonorgestrel (MIRENA) IUD 52 mg 1 each     2020      1 each, Intrauterine, ONCE     levonorgestrel (MIRENA) IUD 52 mg    1 each 2020    Si each by Intrauterine route once for 1 dose    Class: Print    Route: Intrauterine    Cosign for Ordering: Accepted by NATASHA Viera CNP on   2020 12:02 AM        Social History    Tobacco Use      Smoking status: Current Every Day Smoker        Packs/day: 1.00        Types: Cigarettes      Smokeless tobacco: Never Used       Standing Status:   Future     Standing Expiration Date:   9/10/2021     Order Specific Question:   Collection Type     Answer:   SurePath     Order Specific Question:   Prior Abnormal Pap Test     Answer:   No     Order Specific Question:   Screening or Diagnostic     Answer:   Screening     Order Specific Question:   HPV Requested? Answer:   Yes     Order Specific Question:   High Risk Patient     Answer:   N/A     No orders of the defined types were placed in this encounter. There are no discontinued medications. Return in about 3 months (around 12/10/2020). Reviewed with the patient: current clinical status, medications, activities and diet. Side effects, adverse effects of the medication prescribed today, as well as treatment plan/ rationale and result expectations have been discussed with the patient who expresses understanding and desires to proceed.     Close follow up to evaluate treatment results and for coordination of care. I have reviewed the patient's medical history in detail and updated the computerized patient record.     NATASHA Gonzalez - CNP

## 2020-09-16 LAB
HPV COMMENT: NORMAL
HPV TYPE 16: NOT DETECTED
HPV TYPE 18: NOT DETECTED
HPVOH (OTHER TYPES): NOT DETECTED

## 2020-09-22 ENCOUNTER — TELEPHONE (OUTPATIENT)
Dept: FAMILY MEDICINE CLINIC | Age: 45
End: 2020-09-22

## 2020-09-23 NOTE — TELEPHONE ENCOUNTER
Patient called back for results. Provided her with information per Kell's note below. Patient has some questions, which I informed her I could not answer as I am not clinical. Patient wanted to schedule a VV follow up to go over the results. First available was 10/1. Patient states that it's \"freaking her out\" that this is the second test that didn't come out normal.  She wants to know all of what Kell tested her for, and what could be causing the atypical cells.

## 2020-10-01 ENCOUNTER — VIRTUAL VISIT (OUTPATIENT)
Dept: FAMILY MEDICINE CLINIC | Age: 45
End: 2020-10-01
Payer: MEDICAID

## 2020-10-01 PROCEDURE — G8419 CALC BMI OUT NRM PARAM NOF/U: HCPCS | Performed by: NURSE PRACTITIONER

## 2020-10-01 PROCEDURE — G8428 CUR MEDS NOT DOCUMENT: HCPCS | Performed by: NURSE PRACTITIONER

## 2020-10-01 PROCEDURE — G8484 FLU IMMUNIZE NO ADMIN: HCPCS | Performed by: NURSE PRACTITIONER

## 2020-10-01 PROCEDURE — 4004F PT TOBACCO SCREEN RCVD TLK: CPT | Performed by: NURSE PRACTITIONER

## 2020-10-01 PROCEDURE — 99213 OFFICE O/P EST LOW 20 MIN: CPT | Performed by: NURSE PRACTITIONER

## 2020-10-01 ASSESSMENT — ENCOUNTER SYMPTOMS
RESPIRATORY NEGATIVE: 1
CONSTIPATION: 0
ALLERGIC/IMMUNOLOGIC NEGATIVE: 1
GASTROINTESTINAL NEGATIVE: 1
COLOR CHANGE: 0
EYES NEGATIVE: 1
ABDOMINAL PAIN: 0
DIARRHEA: 0
RECTAL PAIN: 0
TROUBLE SWALLOWING: 0
SHORTNESS OF BREATH: 0
BLOOD IN STOOL: 0
ANAL BLEEDING: 0
VOICE CHANGE: 0

## 2020-10-01 NOTE — PROGRESS NOTES
TELEHEALTH EVALUATION -- Audio/Visual (During BZFLO-46 public health emergency)    -   Carl Soto is a 38 y.o. female being evaluated by a Virtual Visit (video visit) encounter to address concerns as mentioned above. A caregiver was present when appropriate. Due to this being a TeleHealth encounter (During VRINJ-00 public health emergency), evaluation of the following organ systems was limited: Vitals/Constitutional/EENT/Resp/CV/GI//MS/Neuro/Skin/Heme-Lymph-Imm. Pursuant to the emergency declaration under the [address] [place] [address], [address] [place] [address] authority and the [employer] and [employer] Act, this Virtual Visit was conducted with patient's (and/or legal guardian's) consent, to reduce the patient's risk of exposure to COVID-19 and provide necessary medical care. The patient (and/or legal guardian) has also been advised to contact this office for worsening conditions or problems, and seek emergency medical treatment and/or call 911 if deemed necessary. Patient was contacted and agreed to proceed with a virtual visit via [email or web address] me  The risks and benefits of converting to a virtual visit were discussed in light of the current infectious disease epidemic. Patient also understood that insurance coverage and co-pays are up to their individual insurance plans. Patient was located at their home. Provider was located at their office. 10/1/2020  Carl Soto (:  1975) has requested an audio/video evaluation for the following concern(s):    HPI  Wants to discus PAP- positive for ASCUS neg for HPV    Review of Systems   Constitutional: Negative. Negative for activity change, appetite change, fatigue and unexpected weight change. HENT: Negative. Negative for dental problem, nosebleeds, trouble swallowing and voice change. Eyes: Negative. Negative for visual disturbance. Respiratory: Negative. Smoking status: Current Every Day Smoker     Packs/day: 1.00     Types: Cigarettes    Smokeless tobacco: Never Used   Substance and Sexual Activity    Alcohol use: Yes     Alcohol/week: 4.0 standard drinks     Types: 4 Glasses of wine per week    Drug use: No    Sexual activity: Not on file   Lifestyle    Physical activity     Days per week: Not on file     Minutes per session: Not on file    Stress: Not on file   Relationships    Social connections     Talks on phone: Not on file     Gets together: Not on file     Attends Holiness service: Not on file     Active member of club or organization: Not on file     Attends meetings of clubs or organizations: Not on file     Relationship status: Not on file    Intimate partner violence     Fear of current or ex partner: Not on file     Emotionally abused: Not on file     Physically abused: Not on file     Forced sexual activity: Not on file   Other Topics Concern    Not on file   Social History Narrative    Not on file     No family history on file. No Known Allergies    PMH, Surgical Hx, Family Hx, and Social Hx reviewed and updated. Health Maintenance reviewed. PHYSICAL EXAMINATION:  \"[x]\" Indicates a positive item  \"[]\" Indicates a negative item    Vital Signs: (As obtained by patient/caregiver or practitioner observation)    Blood pressure-  Heart rate-    Respiratory rate-    Temperature-  Pulse oximetry-     Constitutional: [x] Appears well-developed and well-nourished [x] No apparent distress      [] Abnormal-   Mental status  [x] Alert and awake  [x] Oriented to person/place/time [x]Able to follow commands      Eyes:  EOM    []  Normal  [] Abnormal-  Sclera  [x]  Normal  [] Abnormal -         Discharge [x]  None visible  [] Abnormal -    HENT:   [x] Normocephalic, atraumatic.   [] Abnormal   [x] Mouth/Throat: Mucous membranes are moist.     External Ears [x] Normal  [] Abnormal-     Neck: [x] No visualized mass     Pulmonary/Chest: [x] Respiratory effort normal.  [x] No visualized signs of difficulty breathing or respiratory distress        [] Abnormal-      Musculoskeletal:   [x] Normal gait with no signs of ataxia         [x] Normal range of motion of neck        [] Abnormal-       Neurological:       [x] No Facial Asymmetry (Cranial nerve 7 motor function) (limited exam to video visit)          [x] No gaze palsy        [] Abnormal-         Skin:        [x] No significant exanthematous lesions or discoloration noted on facial skin         [] Abnormal-            Psychiatric:       [x] Normal Affect [x] No Hallucinations        [] Abnormal-     Other pertinent observable physical exam findings-   Results for orders placed or performed in visit on 09/10/20   Human papillomavirus (HPV) DNA probe thin prep high risk   Result Value Ref Range    HPV TYPE 16 Not Detected Not Detected    HPV TYPE 18 Not Detected Not Detected    HPVOH (OTHER TYPES) Not Detected Not Detected    HPV Comment See below        ASSESSMENT/PLAN:  Assessment & [address] 86 was seen today for abnormal lab. Diagnoses and all orders for this visit:    ASCUS of cervix with negative high risk HPV    Alcohol use disorder, moderate, dependence ([facility] [address]      No orders of the defined types were placed in this encounter. No orders of the defined types were placed in this encounter. There are no discontinued medications. Return in about 3 months (around 1/1/2021) for repeat PAP. Reviewed with the patient: current clinical status, medications, activities and diet. Side effects, adverse effects of the medication prescribed today, as well as treatment plan/ rationale and result expectations have been discussed with the patient who expresses understanding and desires to proceed. Close follow up to evaluate treatment results and for coordination of care. I have reviewed the patient's medical history in detail and updated the computerized patient record.      Patient identification was verified at the start of the visit: Yes    Total time spent on this encounter: Not billed by time      --NATASHA Meza - CNP on 10/1/2020 at 9:57 AM    An electronic signature was used to authenticate this note.

## 2020-11-16 ENCOUNTER — VIRTUAL VISIT (OUTPATIENT)
Dept: FAMILY MEDICINE CLINIC | Age: 45
End: 2020-11-16
Payer: MEDICAID

## 2020-11-16 ENCOUNTER — NURSE ONLY (OUTPATIENT)
Dept: FAMILY MEDICINE CLINIC | Age: 45
End: 2020-11-16

## 2020-11-16 DIAGNOSIS — Z20.822 SUSPECTED COVID-19 VIRUS INFECTION: ICD-10-CM

## 2020-11-16 PROCEDURE — G8428 CUR MEDS NOT DOCUMENT: HCPCS | Performed by: NURSE PRACTITIONER

## 2020-11-16 PROCEDURE — 99213 OFFICE O/P EST LOW 20 MIN: CPT | Performed by: NURSE PRACTITIONER

## 2020-11-16 PROCEDURE — 4004F PT TOBACCO SCREEN RCVD TLK: CPT | Performed by: NURSE PRACTITIONER

## 2020-11-16 PROCEDURE — G8484 FLU IMMUNIZE NO ADMIN: HCPCS | Performed by: NURSE PRACTITIONER

## 2020-11-16 PROCEDURE — G8419 CALC BMI OUT NRM PARAM NOF/U: HCPCS | Performed by: NURSE PRACTITIONER

## 2020-11-16 RX ORDER — DEXTROMETHORPHAN HYDROBROMIDE AND PROMETHAZINE HYDROCHLORIDE 15; 6.25 MG/5ML; MG/5ML
5 SOLUTION ORAL 4 TIMES DAILY
Qty: 140 ML | Refills: 0 | Status: SHIPPED | OUTPATIENT
Start: 2020-11-16 | End: 2020-11-23

## 2020-11-16 RX ORDER — AZITHROMYCIN 250 MG/1
250 TABLET, FILM COATED ORAL SEE ADMIN INSTRUCTIONS
Qty: 6 TABLET | Refills: 0 | Status: SHIPPED | OUTPATIENT
Start: 2020-11-16 | End: 2020-11-21

## 2020-11-16 RX ORDER — ALBUTEROL SULFATE 90 UG/1
2 AEROSOL, METERED RESPIRATORY (INHALATION) EVERY 6 HOURS PRN
Qty: 1 INHALER | Refills: 3 | Status: SHIPPED | OUTPATIENT
Start: 2020-11-16 | End: 2022-09-29 | Stop reason: SDUPTHER

## 2020-11-16 ASSESSMENT — ENCOUNTER SYMPTOMS
WHEEZING: 1
SORE THROAT: 1
SINUS PRESSURE: 1
SHORTNESS OF BREATH: 1
COUGH: 1

## 2020-11-16 NOTE — PATIENT INSTRUCTIONS
Patient Education        10 Things to Do When You Have COVID-19    Stay home. Don't go to school, work, or public areas. And don't use public transportation, ride-shares, or taxis unless you have no choice. Leave your home only if you need to get medical care. But call the doctor's office first so they know you're coming. And wear a cloth face cover. Ask before leaving isolation. Talk with your doctor or other health professional about when it will be safe for you to leave isolation. Wear a cloth face cover when you are around other people. It can help stop the spread of the virus when you cough or sneeze. Limit contact with people in your home. If possible, stay in a separate bedroom and use a separate bathroom. Avoid contact with pets and other animals. If possible, have a friend or family member care for them while you're sick. Cover your mouth and nose with a tissue when you cough or sneeze. Then throw the tissue in the trash right away. Wash your hands often, especially after you cough or sneeze. Use soap and water, and scrub for at least 20 seconds. If soap and water aren't available, use an alcohol-based hand . Don't share personal household items. These include bedding, towels, cups and glasses, and eating utensils. Clean and disinfect your home every day. Use household  or disinfectant wipes or sprays. Take special care to clean things that you grab with your hands. These include doorknobs, remote controls, phones, and handles on your refrigerator and microwave. And don't forget countertops, tabletops, bathrooms, and computer keyboards. Take acetaminophen (Tylenol) to relieve fever and body aches. Read and follow all instructions on the label. Current as of: July 10, 2020               Content Version: 12.6  © 2006-2020 [employer]. Care instructions adapted under license by [facility] ([facility]).  If you have questions about a medical condition or this instruction, always ask your healthcare professional. [place] [address] any warranty or liability for your use of this information.

## 2020-11-16 NOTE — PROGRESS NOTES
Subjective:      Patient ID: Carlos A Fofana is a 38 y.o. female who presents today for:     Chief Complaint   Patient presents with    Cough       HPI Reports Thursday night she woke up with sore throat and now is having head congestion and lung congestion. She reports she is coughing up green phlegm. She reports she does usually get this around this time of year due to smoking. She does also have some body aches, but denies fever or chills. She has used m ucinex DM and albuterol inhaler with some relief. Past Medical History:   Diagnosis Date    Depression      Past Surgical History:   Procedure Laterality Date    BREAST ENHANCEMENT SURGERY       No family history on file. Social History     Socioeconomic History    Marital status: Single     Spouse name: Not on file    Number of children: Not on file    Years of education: Not on file    Highest education level: Not on file   Occupational History    Not on file   Social Needs    Financial resource strain: Not on file    Food insecurity     Worry: Not on file     Inability: Not on file    Transportation needs     Medical: Not on file     Non-medical: Not on file   Tobacco Use    Smoking status: Current Every Day Smoker     Packs/day: 1.00     Types: Cigarettes    Smokeless tobacco: Never Used   Substance and Sexual Activity    Alcohol use:  Yes     Alcohol/week: 4.0 standard drinks     Types: 4 Glasses of wine per week    Drug use: No    Sexual activity: Not on file   Lifestyle    Physical activity     Days per week: Not on file     Minutes per session: Not on file    Stress: Not on file   Relationships    Social connections     Talks on phone: Not on file     Gets together: Not on file     Attends Taoism service: Not on file     Active member of club or organization: Not on file     Attends meetings of clubs or organizations: Not on file     Relationship status: Not on file    Intimate partner violence     Fear of current or ex partner: Not on file     Emotionally abused: Not on file     Physically abused: Not on file     Forced sexual activity: Not on file   Other Topics Concern    Not on file   Social History Narrative    Not on file     Current Outpatient Medications on File Prior to Visit   Medication Sig Dispense Refill    divalproex (DEPAKOTE ER) 250 MG extended release tablet Take 250 mg by mouth daily      medroxyPROGESTERone (PROVERA) 10 MG tablet Take 1 tablet by mouth daily 10 tablet 0    levonorgestrel (MIRENA) IUD 52 mg 1 each by Intrauterine route once for 1 dose 1 each 0    hydrOXYzine (VISTARIL) 50 MG capsule Take 50 mg by mouth 3 times daily as needed       Current Facility-Administered Medications on File Prior to Visit   Medication Dose Route Frequency Provider Last Rate Last Dose    levonorgestrel (MIRENA) IUD 52 mg 1 each  1 each Intrauterine Once Kell Turner, NATASHA - CNP   1 each at 07/13/20 2490       Allergies:  Patient has no known allergies. Review of Systems   Constitutional: Positive for fatigue. Negative for chills and fever. HENT: Positive for congestion, sinus pressure and sore throat. Negative for ear discharge and ear pain. Respiratory: Positive for cough, shortness of breath and wheezing. Neurological: Negative for headaches. Objective: There were no vitals taken for this visit. Physical Exam  Constitutional:       General: She is awake. She is not in acute distress. Appearance: Normal appearance. She is not ill-appearing or diaphoretic. HENT:      Head: Normocephalic. Right Ear: External ear normal.      Left Ear: External ear normal.      Nose: Nose normal.      Mouth/Throat:      Lips: Pink. Mouth: Mucous membranes are moist.   Pulmonary:      Effort: Pulmonary effort is normal. No respiratory distress. Skin:     Coloration: Skin is not ashen, cyanotic, jaundiced, mottled, pale or sallow.    Neurological:      Mental Status: She is alert and oriented to person, tablet     Refill:  0    albuterol sulfate HFA (PROVENTIL HFA) 108 (90 Base) MCG/ACT inhaler     Sig: Inhale 2 puffs into the lungs every 6 hours as needed for Wheezing     Dispense:  1 Inhaler     Refill:  3       Return if symptoms worsen or fail to improve. Will need testing for COVID-19 and quarantine until test resulted is negative with symptom improvement or 10 days from of onset of symptoms. Discussed OTC comfort care. Pt to f/u if not improving as expected or to Er if symptoms severe. Reviewed with the patient: current clinicalstatus, medications, activities and diet. Side effects, adverse effects of the medication prescribedtoday, as well as treatment plan/ rationale and result expectations have been discussedwith the patient who expresses understanding and desires to proceed. Close follow upto evaluate treatment results and for coordination of care. I have reviewedthe patient's medical history in detail and updated the computerized patient record. TELEHEALTH EVALUATION -- Audio/Visual (During [facility] public health emergency)    -   Theo Schultz is a 38 y.o. female being evaluated by a Virtual Visit (video visit) encounter to address concerns as mentioned above. A caregiver was present when appropriate. Due to this being a TeleHealth encounter (During [address] emergency), evaluation of the following organ systems was limited: Vitals/Constitutional/EENT/Resp/CV/GI//MS/Neuro/Skin/Heme-Lymph-Imm. Pursuant to the emergency declaration under the [facility], [address] [place] [address] authority and the [employer] and [employer] Act, this Virtual Visit was conducted with patient's (and/or legal guardian's) consent, to reduce the patient's risk of exposure to COVID-19 and provide necessary medical care.   The patient (and/or legal guardian) has also been advised to contact this office for worsening conditions or

## 2020-11-18 LAB
SARS-COV-2: NOT DETECTED
SOURCE: NORMAL

## 2021-01-14 ENCOUNTER — VIRTUAL VISIT (OUTPATIENT)
Dept: FAMILY MEDICINE CLINIC | Age: 46
End: 2021-01-14
Payer: MEDICAID

## 2021-01-14 DIAGNOSIS — H20.9 UVEITIS: Primary | ICD-10-CM

## 2021-01-14 DIAGNOSIS — F31.81 BIPOLAR II DISORDER (HCC): ICD-10-CM

## 2021-01-14 DIAGNOSIS — F10.20 ALCOHOL USE DISORDER, MODERATE, DEPENDENCE (HCC): ICD-10-CM

## 2021-01-14 PROCEDURE — G8484 FLU IMMUNIZE NO ADMIN: HCPCS | Performed by: NURSE PRACTITIONER

## 2021-01-14 PROCEDURE — G8419 CALC BMI OUT NRM PARAM NOF/U: HCPCS | Performed by: NURSE PRACTITIONER

## 2021-01-14 PROCEDURE — 4004F PT TOBACCO SCREEN RCVD TLK: CPT | Performed by: NURSE PRACTITIONER

## 2021-01-14 PROCEDURE — G8427 DOCREV CUR MEDS BY ELIG CLIN: HCPCS | Performed by: NURSE PRACTITIONER

## 2021-01-14 PROCEDURE — 99213 OFFICE O/P EST LOW 20 MIN: CPT | Performed by: NURSE PRACTITIONER

## 2021-01-14 RX ORDER — SULFACETAMIDE SODIUM AND PREDNISOLONE SODIUM PHOSPHATE 100; 2.3 MG/ML; MG/ML
1 SOLUTION/ DROPS OPHTHALMIC
Qty: 2.8 ML | Refills: 0 | Status: SHIPPED | OUTPATIENT
Start: 2021-01-14 | End: 2021-01-21

## 2021-01-14 RX ORDER — DOXEPIN HYDROCHLORIDE 25 MG/1
25 CAPSULE ORAL NIGHTLY
Qty: 30 CAPSULE | Refills: 3 | Status: SHIPPED | OUTPATIENT
Start: 2021-01-14 | End: 2022-01-27

## 2021-01-14 ASSESSMENT — ENCOUNTER SYMPTOMS
CONSTIPATION: 0
EYE DISCHARGE: 1
EYE REDNESS: 1
WHEEZING: 0
SWOLLEN GLANDS: 0
EYE ITCHING: 1
SORE THROAT: 0
ORTHOPNEA: 0
RHINORRHEA: 0
EYE PAIN: 0
ABDOMINAL PAIN: 0
STRIDOR: 0
DIARRHEA: 0
DOUBLE VISION: 0
COUGH: 0
VOMITING: 0
PHOTOPHOBIA: 0
NAUSEA: 0

## 2021-01-14 NOTE — PROGRESS NOTES
Jes Borden (:  1975) is a 54 y.o. female,Established patient, here for evaluation of the following chief complaint(s): No chief complaint on file. ASSESSMENT/PLAN:  1. Uveitis  -     sulfacetamide-prednisoLONE (VASOCIDIN) 10-0.23 % ophthalmic solution; Place 1 drop into both eyes every 3 hours for 7 days, Disp-2.8 mL, R-0Normal  2. Alcohol use disorder, moderate, dependence ([address]  3. Bipolar II disorder (HCC)  -     doxepin (SINEQUAN) 25 MG capsule; Take 1 capsule by mouth nightly, Disp-30 capsule, R-3Normal      Return in about 8 weeks (around 3/11/2021). SUBJECTIVE/OBJECTIVE:  Insomnia:  Current treatment: visteril, which has been not very effective. Medication side effects: none, dry mouth and dry eyes. History of uveitis has been treated in the past with antibiotic and steroid therapy with good effects. This is a recurrent issue sometimes when she uses antihistamines for several weeks she has recently been taking Vistaril at night for sleep which precipitated this episode    Conjunctivitis   The current episode started more than 1 week ago. The onset was gradual. The problem occurs continuously. The problem has been gradually worsening. The problem is moderate. Nothing relieves the symptoms. The symptoms are aggravated by light. Associated symptoms include eye itching, eye discharge and eye redness. Pertinent negatives include no orthopnea, no fever, no decreased vision, no double vision, no photophobia, no abdominal pain, no constipation, no diarrhea, no nausea, no vomiting, no congestion, no ear discharge, no ear pain, no headaches, no hearing loss, no mouth sores, no rhinorrhea, no sore throat, no stridor, no swollen glands, no muscle aches, no neck pain, no neck stiffness, no cough, no URI, no wheezing, no rash, no diaper rash and no eye pain. Severity: no pain. Both eyes are affected. The eyelid exhibits redness. Review of Systems   Constitutional: Negative for fever. HENT: Negative for congestion, ear discharge, ear pain, hearing loss, mouth sores, rhinorrhea and sore throat. Eyes: Positive for discharge, redness and itching. Negative for double vision, photophobia and pain. Respiratory: Negative for cough, wheezing and stridor. Cardiovascular: Negative for orthopnea. Gastrointestinal: Negative for abdominal pain, constipation, diarrhea, nausea and vomiting. Musculoskeletal: Negative for neck pain. Skin: Negative for rash. Neurological: Negative for headaches. No flowsheet data found.      Physical Exam    [INSTRUCTIONS:  \"[x]\" Indicates a positive item  \"[]\" Indicates a negative item  -- DELETE ALL ITEMS NOT EXAMINED]    Constitutional: [x] Appears well-developed and well-nourished [x] No apparent distress      [] Abnormal -     Mental status: [x] Alert and awake  [x] Oriented to person/place/time [x] Able to follow commands    [] Abnormal -     Eyes:   EOM    [x]  Normal    [] Abnormal -   Sclera  [x]  Normal    [] Abnormal -          Discharge [x]  None visible   [] Abnormal -     HENT: [x] Normocephalic, atraumatic  [] Abnormal -   [x] Mouth/Throat: Mucous membranes are moist    External Ears [x] Normal  [] Abnormal -    Neck: [x] No visualized mass [] Abnormal -     Pulmonary/Chest: [x] Respiratory effort normal   [x] No visualized signs of difficulty breathing or respiratory distress        [] Abnormal -      Musculoskeletal:   [x] Normal gait with no signs of ataxia         [x] Normal range of motion of neck        [] Abnormal -     Neurological:        [x] No Facial Asymmetry (Cranial nerve 7 motor function) (limited exam due to video visit)          [x] No gaze palsy        [] Abnormal -          Skin:        [x] No significant exanthematous lesions or discoloration noted on facial skin         [] Abnormal -            Psychiatric:       [x] Normal Affect [] Abnormal -        [x] No Hallucinations Other pertinent observable physical exam findings:-          On this date 01/14/21 I have spent 20 minutes reviewing previous notes, test results and face to face (virtual) with the patient discussing the diagnosis and importance of compliance with the treatment plan as well as documenting on the day of the visit. Cassie Souza is a 54 y.o. female being evaluated by a Virtual Visit (video visit) encounter to address concerns as mentioned above. A caregiver was present when appropriate. Due to this being a TeleHealth encounter ([facility] [place] [facility]), evaluation of the following organ systems was limited: Vitals/Constitutional/EENT/Resp/CV/GI//MS/Neuro/Skin/Heme-Lymph-Imm. Pursuant to the emergency declaration under the [address] [place] [address] authority and the [employer] Act, this Virtual Visit was conducted with patient's (and/or legal guardian's) consent, to reduce the patient's risk of exposure to COVID-19 and provide necessary medical care. The patient (and/or legal guardian) has also been advised to contact this office for worsening conditions or problems, and seek emergency medical treatment and/or call 911 if deemed necessary. Patient identification was verified at the start of the visit: Yes    Services were provided through a video synchronous discussion virtually to substitute for in-person clinic visit. Patient was located at home and provider was located in office or at home. An electronic signature was used to authenticate this note.     --Benjamin Basilio, NATASHA - CNP

## 2021-09-04 ENCOUNTER — HOSPITAL ENCOUNTER (EMERGENCY)
Age: 46
Discharge: HOME | End: 2021-09-04
Payer: MEDICAID

## 2021-09-04 ENCOUNTER — APPOINTMENT (OUTPATIENT)
Dept: GENERAL RADIOLOGY | Age: 46
End: 2021-09-04
Payer: MEDICAID

## 2021-09-04 VITALS
WEIGHT: 160 LBS | SYSTOLIC BLOOD PRESSURE: 154 MMHG | BODY MASS INDEX: 23.7 KG/M2 | HEIGHT: 69 IN | DIASTOLIC BLOOD PRESSURE: 92 MMHG | OXYGEN SATURATION: 99 % | HEART RATE: 74 BPM | TEMPERATURE: 97.8 F | RESPIRATION RATE: 18 BRPM

## 2021-09-04 DIAGNOSIS — S39.012A STRAIN OF LUMBAR REGION, INITIAL ENCOUNTER: Primary | ICD-10-CM

## 2021-09-04 LAB
BACTERIA: ABNORMAL /HPF
BILIRUBIN URINE: NEGATIVE
BLOOD, URINE: NEGATIVE
CLARITY: ABNORMAL
COLOR: YELLOW
EPITHELIAL CELLS, UA: ABNORMAL /HPF (ref 0–5)
GLUCOSE URINE: NEGATIVE MG/DL
HYALINE CASTS: ABNORMAL /HPF (ref 0–5)
KETONES, URINE: NEGATIVE MG/DL
LEUKOCYTE ESTERASE, URINE: ABNORMAL
NITRITE, URINE: NEGATIVE
PH UA: 6 (ref 5–9)
PROTEIN UA: NEGATIVE MG/DL
SPECIFIC GRAVITY UA: 1.02 (ref 1–1.03)
URINE REFLEX TO CULTURE: ABNORMAL
UROBILINOGEN, URINE: 0.2 E.U./DL
WBC UA: ABNORMAL /HPF (ref 0–5)

## 2021-09-04 PROCEDURE — 81001 URINALYSIS AUTO W/SCOPE: CPT

## 2021-09-04 PROCEDURE — 6360000002 HC RX W HCPCS: Performed by: PHYSICIAN ASSISTANT

## 2021-09-04 PROCEDURE — 99283 EMERGENCY DEPT VISIT LOW MDM: CPT

## 2021-09-04 PROCEDURE — 96372 THER/PROPH/DIAG INJ SC/IM: CPT

## 2021-09-04 PROCEDURE — 72110 X-RAY EXAM L-2 SPINE 4/>VWS: CPT

## 2021-09-04 RX ORDER — ETODOLAC 400 MG/1
400 TABLET, FILM COATED ORAL 2 TIMES DAILY
Qty: 14 TABLET | Refills: 0 | Status: SHIPPED | OUTPATIENT
Start: 2021-09-04 | End: 2022-09-26

## 2021-09-04 RX ORDER — KETOROLAC TROMETHAMINE 30 MG/ML
60 INJECTION, SOLUTION INTRAMUSCULAR; INTRAVENOUS ONCE
Status: COMPLETED | OUTPATIENT
Start: 2021-09-04 | End: 2021-09-04

## 2021-09-04 RX ORDER — ORPHENADRINE CITRATE 30 MG/ML
60 INJECTION INTRAMUSCULAR; INTRAVENOUS ONCE
Status: COMPLETED | OUTPATIENT
Start: 2021-09-04 | End: 2021-09-04

## 2021-09-04 RX ORDER — TIZANIDINE 4 MG/1
4 TABLET ORAL EVERY 8 HOURS PRN
Qty: 21 TABLET | Refills: 0 | Status: SHIPPED | OUTPATIENT
Start: 2021-09-04 | End: 2022-11-01 | Stop reason: ALTCHOICE

## 2021-09-04 RX ORDER — METHYLPREDNISOLONE 4 MG/1
TABLET ORAL
Qty: 21 TABLET | Refills: 0 | Status: SHIPPED | OUTPATIENT
Start: 2021-09-04 | End: 2021-09-10

## 2021-09-04 RX ADMIN — KETOROLAC TROMETHAMINE 60 MG: 30 INJECTION, SOLUTION INTRAMUSCULAR at 18:00

## 2021-09-04 RX ADMIN — ORPHENADRINE CITRATE 60 MG: 30 INJECTION INTRAMUSCULAR; INTRAVENOUS at 18:01

## 2021-09-04 ASSESSMENT — ENCOUNTER SYMPTOMS
APNEA: 0
ABDOMINAL PAIN: 0
ALLERGIC/IMMUNOLOGIC NEGATIVE: 1
COLOR CHANGE: 0
SHORTNESS OF BREATH: 0
TROUBLE SWALLOWING: 0
EYE PAIN: 0
BACK PAIN: 1

## 2021-09-04 ASSESSMENT — PAIN DESCRIPTION - DESCRIPTORS
DESCRIPTORS: ACHING;CONSTANT;DULL
DESCRIPTORS: ACHING

## 2021-09-04 ASSESSMENT — PAIN DESCRIPTION - PAIN TYPE
TYPE: ACUTE PAIN
TYPE: ACUTE PAIN

## 2021-09-04 ASSESSMENT — PAIN SCALES - GENERAL
PAINLEVEL_OUTOF10: 7
PAINLEVEL_OUTOF10: 7
PAINLEVEL_OUTOF10: 4

## 2021-09-04 ASSESSMENT — PAIN DESCRIPTION - FREQUENCY: FREQUENCY: CONTINUOUS

## 2021-09-04 ASSESSMENT — PAIN DESCRIPTION - LOCATION: LOCATION: BACK

## 2021-09-04 ASSESSMENT — PAIN DESCRIPTION - ORIENTATION: ORIENTATION: LEFT;LOWER

## 2021-09-04 NOTE — ED TRIAGE NOTES
Patient reports that she was moving furniture a week ago. States that she has had lower back pain every since.

## 2021-09-04 NOTE — ED PROVIDER NOTES
[address] [facility] ED  eMERGENCYdEPARTMENT eNCOUnter      Pt Name: Jossie Vicente  MRN: [id number]  [place] 1975  Date of evaluation: 9/4/2021  Provider:Saud Ramirez PA-C    CHIEF COMPLAINT       Chief Complaint   Patient presents with    Back Pain     Lower, left side         HISTORY OF PRESENT ILLNESS  (Location/Symptom, Timing/Onset, Context/Setting, Quality, Duration, Modifying Factors, Severity.)   Jossie Vicente is a 54 y.o. female who presents to the emergency department with a 1 week history of left lower back pain. Patient states that she was moving furniture on Monday and has been having lower back pain since then. Patient denies any saddle paresthesias or loss of bowel or bladder control. Patient denies any falls or blunt trauma. Patient states that she had leftover Flexeril that she was trying to take as well as lidocaine patches but the pain continues. Patient denies any hematuria or dysuria. Patient denies any radiation of pain into the abdomen, buttock or leg. HPI    Nursing Notes were reviewed and I agree. REVIEW OF SYSTEMS    (2-9 systems for level 4, 10 or more for level 5)     Review of Systems   Constitutional: Negative for diaphoresis and fever. HENT: Negative for hearing loss and trouble swallowing. Eyes: Negative for pain. Respiratory: Negative for apnea and shortness of breath. Cardiovascular: Negative for chest pain. Gastrointestinal: Negative for abdominal pain. Endocrine: Negative. Genitourinary: Negative for hematuria. Musculoskeletal: Positive for back pain. Negative for neck pain and neck stiffness. Skin: Negative for color change. Allergic/Immunologic: Negative. Neurological: Negative for dizziness and numbness. Hematological: Negative. Psychiatric/Behavioral: Negative. All other systems reviewed and are negative. Except as noted above the remainder of the review of systems was reviewed and negative.        PAST MEDICAL HISTORY     Past Medical History:   Diagnosis Date    Depression          SURGICAL HISTORY       Past Surgical History:   Procedure Laterality Date    BREAST ENHANCEMENT SURGERY           CURRENT MEDICATIONS       Discharge Medication List as of 9/4/2021  6:39 PM      CONTINUE these medications which have NOT CHANGED    Details   doxepin (SINEQUAN) 25 MG capsule Take 1 capsule by mouth nightly, Disp-30 capsule, R-3Normal      albuterol sulfate HFA (PROVENTIL HFA) 108 (90 Base) MCG/ACT inhaler Inhale 2 puffs into the lungs every 6 hours as needed for Wheezing, Disp-1 Inhaler,R-3Normal      divalproex (DEPAKOTE ER) 250 MG extended release tablet Take 250 mg by mouth dailyHistorical Med      medroxyPROGESTERone (PROVERA) 10 MG tablet Take 1 tablet by mouth daily, Disp-10 tablet, R-0Normal      levonorgestrel (MIRENA) IUD 52 mg 1 each by Intrauterine route once for 1 dose, Intrauterine, ONCE Starting Thu 6/25/2020, For 1 dose, Disp-1 each, R-0, Print      hydrOXYzine (VISTARIL) 50 MG capsule Take 50 mg by mouth 3 times daily as neededHistorical Med             ALLERGIES     Patient has no known allergies. FAMILY HISTORY     History reviewed. No pertinent family history. SOCIAL HISTORY       Social History     Socioeconomic History    Marital status: Single     Spouse name: None    Number of children: None    Years of education: None    Highest education level: None   Occupational History    None   Tobacco Use    Smoking status: Current Every Day Smoker     Packs/day: 1.00     Types: Cigarettes    Smokeless tobacco: Never Used   Vaping Use    Vaping Use: Never used   Substance and Sexual Activity    Alcohol use:  Yes     Alcohol/week: 4.0 standard drinks     Types: 4 Glasses of wine per week    Drug use: No    Sexual activity: None   Other Topics Concern    None   Social History Narrative    None     Social Determinants of Health     Financial Resource Strain:     Difficulty of Paying Living Expenses:    Food Insecurity:     Worried About Running Out of Food in the Last Year:     [address] in the Last Year:    Transportation Needs:     Lack of Transportation (Medical):  Lack of Transportation (Non-Medical):    Physical Activity:     Days of Exercise per Week:     Minutes of Exercise per Session:    Stress:     Feeling of Stress :    Social Connections:     Frequency of Communication with Friends and Family:     Frequency of Social Gatherings with Friends and Family:     Attends Bahai Services:     Active Member of Clubs or Organizations:     Attends Club or Organization Meetings:     Marital Status:    Intimate Partner Violence:     Fear of Current or Ex-Partner:     Emotionally Abused:     Physically Abused:     Sexually Abused:        SCREENINGS           PHYSICAL EXAM    (up to 7 forlevel 4, 8 or more for level 5)     ED Triage Vitals [09/04/21 1741]   BP Temp Temp Source Pulse Resp SpO2 Height Weight   (!) 154/92 97.8 °F (36.6 °C) Oral 74 18 99 % 5' 9\" (1.753 m) 160 lb (72.6 kg)       Physical Exam  Vitals and nursing note reviewed. Constitutional:       General: She is not in acute distress. Appearance: She is well-developed. She is not diaphoretic. HENT:      Head: Normocephalic and atraumatic. Mouth/Throat:      Pharynx: No oropharyngeal exudate. Eyes:      General: No scleral icterus. Conjunctiva/sclera: Conjunctivae normal.      Pupils: Pupils are equal, round, and reactive to light. Neck:      Trachea: No tracheal deviation. Cardiovascular:      Rate and Rhythm: Normal rate. Heart sounds: Normal heart sounds. Pulmonary:      Effort: Pulmonary effort is normal. No respiratory distress. Breath sounds: Normal breath sounds. Abdominal:      General: Bowel sounds are normal. There is no distension. Palpations: Abdomen is soft. Musculoskeletal:         General: Normal range of motion.       Cervical back: Normal range of motion and neck supple. Lumbar back: Spasms and tenderness present. Back:    Skin:     General: Skin is warm and dry. Findings: No erythema or rash. Neurological:      Mental Status: She is alert and oriented to person, place, and time. Cranial Nerves: No cranial nerve deficit. Motor: No abnormal muscle tone. Psychiatric:         Behavior: Behavior normal.         Thought Content: Thought content normal.         Judgment: Judgment normal.           DIAGNOSTIC RESULTS     RADIOLOGY:   Non-plain film images such as CT, Ultrasound and MRI are read by the radiologist. Samia Fabiobaron radiographic images are visualized and preliminarilyinterpreted by Andria Lee PA-C with the below findings:      Interpretation per the Radiologist below, if available at the time of this note:    XR LUMBAR SPINE (MIN 4 VIEWS)    (Results Pending)       LABS:  Labs Reviewed   URINE RT REFLEX TO CULTURE - Abnormal; Notable for the following components:       Result Value    Clarity, UA CLOUDY (*)     Leukocyte Esterase, Urine SMALL (*)     All other components within normal limits   MICROSCOPIC URINALYSIS       All other labs were within normal range or not returnedas of this dictation. EMERGENCYDEPARTMENT COURSE and DIFFERENTIAL DIAGNOSIS/MDM:   Vitals:    Vitals:    09/04/21 1741   BP: (!) 154/92   Pulse: 74   Resp: 18   Temp: 97.8 °F (36.6 °C)   TempSrc: Oral   SpO2: 99%   Weight: 160 lb (72.6 kg)   Height: 5' 9\" (1.753 m)       REASSESSMENT        Patient presents emergency department with a 1 week history of lower back pain following a lifting type of injury 1 week ago. Patient had no saddle paresthesias or loss of bowel or bladder control to suggest spinal cord related involvement. Patient will be written prescriptions for anti-inflammatories, steroids and muscle relaxants.   I would will refer the patient back to PCP as well as neurosurgery for outpatient follow-up    MDM    PROCEDURES:    Procedures      FINAL IMPRESSION      1.  Strain of lumbar region, initial encounter          DISPOSITION/PLAN   DISPOSITION Decision To Discharge 09/04/2021 06:27:22 PM      PATIENT REFERRED TO:  NATASHA Du - CNP  [address] [facility]  [phone]    Call in 2 days      MD Raz Nunez [address]  [phone]      As needed      DISCHARGE MEDICATIONS:  Discharge Medication List as of 9/4/2021  6:39 PM      START taking these medications    Details   methylPREDNISolone (MEDROL, JARET,) 4 MG tablet Take by mouth., Disp-21 tablet, R-0Normal      tiZANidine (ZANAFLEX) 4 MG tablet Take 1 tablet by mouth every 8 hours as needed (back pain/spasm), Disp-21 tablet, R-0Normal      etodolac (LODINE) 400 MG tablet Take 1 tablet by mouth 2 times daily For pain, Disp-14 tablet, R-0Normal             (Please note that portions of this note were completed with a voice recognition program.  Efforts were made to edit the dictations but occasionally words are mis-transcribed.)    MIGUEL Dotson PA-C  09/04/21 4525

## 2022-01-27 ENCOUNTER — VIRTUAL VISIT (OUTPATIENT)
Dept: FAMILY MEDICINE CLINIC | Age: 47
End: 2022-01-27
Payer: MEDICAID

## 2022-01-27 DIAGNOSIS — I10 PRIMARY HYPERTENSION: ICD-10-CM

## 2022-01-27 DIAGNOSIS — F31.81 BIPOLAR II DISORDER (HCC): Primary | ICD-10-CM

## 2022-01-27 PROCEDURE — G8484 FLU IMMUNIZE NO ADMIN: HCPCS | Performed by: NURSE PRACTITIONER

## 2022-01-27 PROCEDURE — G8427 DOCREV CUR MEDS BY ELIG CLIN: HCPCS | Performed by: NURSE PRACTITIONER

## 2022-01-27 PROCEDURE — 4004F PT TOBACCO SCREEN RCVD TLK: CPT | Performed by: NURSE PRACTITIONER

## 2022-01-27 PROCEDURE — G8420 CALC BMI NORM PARAMETERS: HCPCS | Performed by: NURSE PRACTITIONER

## 2022-01-27 PROCEDURE — 99214 OFFICE O/P EST MOD 30 MIN: CPT | Performed by: NURSE PRACTITIONER

## 2022-01-27 RX ORDER — LISINOPRIL 5 MG/1
5 TABLET ORAL DAILY
Qty: 30 TABLET | Refills: 5 | Status: SHIPPED | OUTPATIENT
Start: 2022-01-27 | End: 2022-03-01 | Stop reason: SDUPTHER

## 2022-01-27 ASSESSMENT — ENCOUNTER SYMPTOMS
ORTHOPNEA: 0
BLURRED VISION: 1
SHORTNESS OF BREATH: 0

## 2022-01-27 ASSESSMENT — PATIENT HEALTH QUESTIONNAIRE - PHQ9
SUM OF ALL RESPONSES TO PHQ QUESTIONS 1-9: 0
SUM OF ALL RESPONSES TO PHQ9 QUESTIONS 1 & 2: 0
2. FEELING DOWN, DEPRESSED OR HOPELESS: 0
SUM OF ALL RESPONSES TO PHQ QUESTIONS 1-9: 0
1. LITTLE INTEREST OR PLEASURE IN DOING THINGS: 0
SUM OF ALL RESPONSES TO PHQ QUESTIONS 1-9: 0
SUM OF ALL RESPONSES TO PHQ QUESTIONS 1-9: 0

## 2022-01-27 NOTE — PROGRESS NOTES
Polly Amor (:  1975) is a Established patient, here for evaluation of the following:    Assessment & Plan   Below is the assessment and plan developed based on review of pertinent history, physical exam, labs, studies, and medications. 1. Bipolar II disorder ([facility] [address]  2. Primary hypertension  -     lisinopril (PRINIVIL;ZESTRIL) 5 MG tablet; Take 1 tablet by mouth daily, Disp-30 tablet, R-5Normal    Return in about 4 weeks (around 2022). Subjective   /90 at home as high as 189/100    Hypertension  This is a new problem. The current episode started more than 1 month ago. The problem is uncontrolled. Associated symptoms include anxiety and blurred vision. Pertinent negatives include no chest pain, headaches, malaise/fatigue, neck pain, orthopnea, palpitations, peripheral edema, PND, shortness of breath or sweats. There are no associated agents to hypertension. Risk factors for coronary artery disease include obesity. The current treatment provides no improvement. There is no history of angina, kidney disease, CAD/MI, CVA, heart failure, left ventricular hypertrophy, PVD or retinopathy. Review of Systems   Constitutional: Negative for malaise/fatigue. Eyes: Positive for blurred vision. Respiratory: Negative for shortness of breath. Cardiovascular: Negative for chest pain, palpitations, orthopnea and PND. Musculoskeletal: Negative for neck pain. Neurological: Negative for headaches.           Objective   Patient-Reported Vitals  No data recorded     Physical Exam  [INSTRUCTIONS:  \"[x]\" Indicates a positive item  \"[]\" Indicates a negative item  -- DELETE ALL ITEMS NOT EXAMINED]    Constitutional: [x] Appears well-developed and well-nourished [x] No apparent distress      [] Abnormal -     Mental status: [x] Alert and awake  [x] Oriented to person/place/time [x] Able to follow commands    [] Abnormal -     Eyes:   EOM    [x]  Normal    [] Abnormal -   Sclera  [x]  Normal    [] CNP

## 2022-03-01 ENCOUNTER — OFFICE VISIT (OUTPATIENT)
Dept: FAMILY MEDICINE CLINIC | Age: 47
End: 2022-03-01
Payer: MEDICAID

## 2022-03-01 VITALS
WEIGHT: 170 LBS | BODY MASS INDEX: 25.18 KG/M2 | HEIGHT: 69 IN | HEART RATE: 90 BPM | SYSTOLIC BLOOD PRESSURE: 156 MMHG | RESPIRATION RATE: 16 BRPM | DIASTOLIC BLOOD PRESSURE: 82 MMHG

## 2022-03-01 DIAGNOSIS — I10 PRIMARY HYPERTENSION: Primary | ICD-10-CM

## 2022-03-01 PROCEDURE — G8419 CALC BMI OUT NRM PARAM NOF/U: HCPCS | Performed by: NURSE PRACTITIONER

## 2022-03-01 PROCEDURE — 99213 OFFICE O/P EST LOW 20 MIN: CPT | Performed by: NURSE PRACTITIONER

## 2022-03-01 PROCEDURE — 4004F PT TOBACCO SCREEN RCVD TLK: CPT | Performed by: NURSE PRACTITIONER

## 2022-03-01 PROCEDURE — G8427 DOCREV CUR MEDS BY ELIG CLIN: HCPCS | Performed by: NURSE PRACTITIONER

## 2022-03-01 PROCEDURE — G8484 FLU IMMUNIZE NO ADMIN: HCPCS | Performed by: NURSE PRACTITIONER

## 2022-03-01 RX ORDER — LISINOPRIL 10 MG/1
10 TABLET ORAL DAILY
Qty: 30 TABLET | Refills: 5 | Status: SHIPPED | OUTPATIENT
Start: 2022-03-01 | End: 2022-03-15 | Stop reason: SDUPTHER

## 2022-03-01 SDOH — ECONOMIC STABILITY: FOOD INSECURITY: WITHIN THE PAST 12 MONTHS, YOU WORRIED THAT YOUR FOOD WOULD RUN OUT BEFORE YOU GOT MONEY TO BUY MORE.: NEVER TRUE

## 2022-03-01 SDOH — ECONOMIC STABILITY: FOOD INSECURITY: WITHIN THE PAST 12 MONTHS, THE FOOD YOU BOUGHT JUST DIDN'T LAST AND YOU DIDN'T HAVE MONEY TO GET MORE.: NEVER TRUE

## 2022-03-01 ASSESSMENT — PATIENT HEALTH QUESTIONNAIRE - PHQ9
7. TROUBLE CONCENTRATING ON THINGS, SUCH AS READING THE NEWSPAPER OR WATCHING TELEVISION: 0
SUM OF ALL RESPONSES TO PHQ9 QUESTIONS 1 & 2: 0
4. FEELING TIRED OR HAVING LITTLE ENERGY: 0
6. FEELING BAD ABOUT YOURSELF - OR THAT YOU ARE A FAILURE OR HAVE LET YOURSELF OR YOUR FAMILY DOWN: 0
SUM OF ALL RESPONSES TO PHQ QUESTIONS 1-9: 0
9. THOUGHTS THAT YOU WOULD BE BETTER OFF DEAD, OR OF HURTING YOURSELF: 0
8. MOVING OR SPEAKING SO SLOWLY THAT OTHER PEOPLE COULD HAVE NOTICED. OR THE OPPOSITE, BEING SO FIGETY OR RESTLESS THAT YOU HAVE BEEN MOVING AROUND A LOT MORE THAN USUAL: 0
5. POOR APPETITE OR OVEREATING: 0
2. FEELING DOWN, DEPRESSED OR HOPELESS: 0
10. IF YOU CHECKED OFF ANY PROBLEMS, HOW DIFFICULT HAVE THESE PROBLEMS MADE IT FOR YOU TO DO YOUR WORK, TAKE CARE OF THINGS AT HOME, OR GET ALONG WITH OTHER PEOPLE: 0
1. LITTLE INTEREST OR PLEASURE IN DOING THINGS: 0
3. TROUBLE FALLING OR STAYING ASLEEP: 0

## 2022-03-01 ASSESSMENT — SOCIAL DETERMINANTS OF HEALTH (SDOH): HOW HARD IS IT FOR YOU TO PAY FOR THE VERY BASICS LIKE FOOD, HOUSING, MEDICAL CARE, AND HEATING?: NOT HARD AT ALL

## 2022-03-09 DIAGNOSIS — I10 PRIMARY HYPERTENSION: ICD-10-CM

## 2022-03-09 LAB
ALBUMIN SERPL-MCNC: 4.4 G/DL (ref 3.5–4.6)
ALP BLD-CCNC: 46 U/L (ref 40–130)
ALT SERPL-CCNC: 20 U/L (ref 0–33)
ANION GAP SERPL CALCULATED.3IONS-SCNC: 14 MEQ/L (ref 9–15)
AST SERPL-CCNC: 27 U/L (ref 0–35)
BASOPHILS ABSOLUTE: 0.1 K/UL (ref 0–0.2)
BASOPHILS RELATIVE PERCENT: 2 %
BILIRUB SERPL-MCNC: 0.3 MG/DL (ref 0.2–0.7)
BUN BLDV-MCNC: 10 MG/DL (ref 6–20)
CALCIUM SERPL-MCNC: 9.2 MG/DL (ref 8.5–9.9)
CHLORIDE BLD-SCNC: 104 MEQ/L (ref 95–107)
CHOLESTEROL, TOTAL: 266 MG/DL (ref 0–199)
CO2: 26 MEQ/L (ref 20–31)
CREAT SERPL-MCNC: 0.66 MG/DL (ref 0.5–0.9)
EOSINOPHILS ABSOLUTE: 0.3 K/UL (ref 0–0.7)
EOSINOPHILS RELATIVE PERCENT: 5.4 %
GFR AFRICAN AMERICAN: >60
GFR NON-AFRICAN AMERICAN: >60
GLOBULIN: 2.6 G/DL (ref 2.3–3.5)
GLUCOSE BLD-MCNC: 91 MG/DL (ref 70–99)
HCT VFR BLD CALC: 42.2 % (ref 37–47)
HDLC SERPL-MCNC: 68 MG/DL (ref 40–59)
HEMOGLOBIN: 14 G/DL (ref 12–16)
LDL CHOLESTEROL CALCULATED: 155 MG/DL (ref 0–129)
LYMPHOCYTES ABSOLUTE: 2 K/UL (ref 1–4.8)
LYMPHOCYTES RELATIVE PERCENT: 35.2 %
MCH RBC QN AUTO: 32.4 PG (ref 27–31.3)
MCHC RBC AUTO-ENTMCNC: 33.2 % (ref 33–37)
MCV RBC AUTO: 97.5 FL (ref 82–100)
MONOCYTES ABSOLUTE: 0.4 K/UL (ref 0.2–0.8)
MONOCYTES RELATIVE PERCENT: 7.9 %
NEUTROPHILS ABSOLUTE: 2.8 K/UL (ref 1.4–6.5)
NEUTROPHILS RELATIVE PERCENT: 49.5 %
PDW BLD-RTO: 13.1 % (ref 11.5–14.5)
PLATELET # BLD: 236 K/UL (ref 130–400)
POTASSIUM SERPL-SCNC: 4.7 MEQ/L (ref 3.4–4.9)
RBC # BLD: 4.33 M/UL (ref 4.2–5.4)
SODIUM BLD-SCNC: 144 MEQ/L (ref 135–144)
TOTAL PROTEIN: 7 G/DL (ref 6.3–8)
TRIGL SERPL-MCNC: 216 MG/DL (ref 0–150)
WBC # BLD: 5.6 K/UL (ref 4.8–10.8)

## 2022-03-10 ASSESSMENT — ENCOUNTER SYMPTOMS
SHORTNESS OF BREATH: 0
ORTHOPNEA: 0
BLURRED VISION: 0

## 2022-03-10 NOTE — PROGRESS NOTES
Subjective  Benita Ibarra, 51 y.o. female presents today with:  Chief Complaint   Patient presents with    1 Month Follow-Up    Hypertension    Manic Behavior        Hypertension  This is a new problem. The current episode started more than 1 month ago. The problem has been gradually improving since onset. Associated symptoms include anxiety. Pertinent negatives include no blurred vision, chest pain, headaches, malaise/fatigue, neck pain, orthopnea, palpitations, peripheral edema, PND, shortness of breath or sweats. Past treatments include ACE inhibitors. The current treatment provides moderate improvement. There are no compliance problems. Review of Systems   Constitutional: Negative for malaise/fatigue. Eyes: Negative for blurred vision. Respiratory: Negative for shortness of breath. Cardiovascular: Negative for chest pain, palpitations, orthopnea and PND. Musculoskeletal: Negative for neck pain. Neurological: Negative for headaches. Past Medical History:   Diagnosis Date    Depression      Past Surgical History:   Procedure Laterality Date    BREAST ENHANCEMENT SURGERY       Social History     Socioeconomic History    Marital status: Single     Spouse name: Not on file    Number of children: Not on file    Years of education: Not on file    Highest education level: Not on file   Occupational History    Not on file   Tobacco Use    Smoking status: Current Every Day Smoker     Packs/day: 1.00     Types: Cigarettes    Smokeless tobacco: Never Used   Vaping Use    Vaping Use: Never used   Substance and Sexual Activity    Alcohol use:  Yes     Alcohol/week: 4.0 standard drinks     Types: 4 Glasses of wine per week    Drug use: No    Sexual activity: Not on file   Other Topics Concern    Not on file   Social History Narrative    Not on file     Social Determinants of Health     Financial Resource Strain: Low Risk     Difficulty of Paying Living Expenses: Not hard at all   Food Insecurity: No Food Insecurity    Worried About Running Out of Food in the Last Year: Never true    Ran Out of Food in the Last Year: Never true   Transportation Needs:     Lack of Transportation (Medical): Not on file    Lack of Transportation (Non-Medical): Not on file   Physical Activity:     Days of Exercise per Week: Not on file    Minutes of Exercise per Session: Not on file   Stress:     Feeling of Stress : Not on file   Social Connections:     Frequency of Communication with Friends and Family: Not on file    Frequency of Social Gatherings with Friends and Family: Not on file    Attends Roman Catholic Services: Not on file    Active Member of [address] [place] [address] Pocits or Organizations: Not on file    Attends Club or Organization Meetings: Not on file    Marital Status: Not on file   Intimate Partner Violence:     Fear of Current or Ex-Partner: Not on file    Emotionally Abused: Not on file    Physically Abused: Not on file    Sexually Abused: Not on file   Housing Stability:     Unable to Pay for Housing in the Last Year: Not on file    Number of [place] in the Last Year: Not on file    Unstable Housing in the Last Year: Not on file     No family history on file.   No Known Allergies  Current Outpatient Medications   Medication Sig Dispense Refill    lisinopril (PRINIVIL;ZESTRIL) 10 MG tablet Take 1 tablet by mouth daily 30 tablet 5    tiZANidine (ZANAFLEX) 4 MG tablet Take 1 tablet by mouth every 8 hours as needed (back pain/spasm) 21 tablet 0    etodolac (LODINE) 400 MG tablet Take 1 tablet by mouth 2 times daily For pain 14 tablet 0    albuterol sulfate HFA (PROVENTIL HFA) 108 (90 Base) MCG/ACT inhaler Inhale 2 puffs into the lungs every 6 hours as needed for Wheezing 1 Inhaler 3    divalproex (DEPAKOTE ER) 250 MG extended release tablet Take 250 mg by mouth daily      medroxyPROGESTERone (PROVERA) 10 MG tablet Take 1 tablet by mouth daily 10 tablet 0    levonorgestrel (MIRENA) IUD 52 mg 1 each by Intrauterine route once for 1 dose 1 each 0    hydrOXYzine (VISTARIL) 50 MG capsule Take 50 mg by mouth 3 times daily as needed       Current Facility-Administered Medications   Medication Dose Route Frequency Provider Last Rate Last Admin    levonorgestrel (MIRENA) IUD 52 mg 1 each  1 each IntraUTERine Once Uriel Cantu, APRN - CNP   1 each at 07/13/20 6740     PMH, Surgical Hx, Family Hx, and Social Hx reviewed and updated. Health Maintenance reviewed. Objective    Vitals:    03/01/22 1644 03/01/22 1648   BP: (!) 156/82 (!) 156/82   Pulse: 90    Resp: 16    Weight: 170 lb (77.1 kg)    Height: 5' 9\" (1.753 m)        Physical Exam  Constitutional:       General: She is not in acute distress. Appearance: She is well-developed. HENT:      Head: Normocephalic and atraumatic. Right Ear: Tympanic membrane and external ear normal.      Left Ear: Tympanic membrane and external ear normal.      Nose: Nose normal.   Eyes:      Conjunctiva/sclera: Conjunctivae normal.      Pupils: Pupils are equal, round, and reactive to light. Neck:      Vascular: No JVD. Cardiovascular:      Rate and Rhythm: Normal rate and regular rhythm. Heart sounds: Normal heart sounds. No murmur heard. Pulmonary:      Effort: Pulmonary effort is normal. No respiratory distress. Breath sounds: Normal breath sounds. No wheezing or rales. Abdominal:      General: Bowel sounds are normal. There is no distension. Palpations: Abdomen is soft. There is no mass. Tenderness: There is no abdominal tenderness. Musculoskeletal:      Cervical back: Neck supple. Lymphadenopathy:      Cervical: No cervical adenopathy. Skin:     General: Skin is warm and dry. Neurological:      Mental Status: She is alert and oriented to person, place, and time. Assessment & Plan   Lexi Alcantar was seen today for 1 month follow-up, hypertension and manic behavior.     Diagnoses and all orders for this visit:    Primary hypertension  -     lisinopril (PRINIVIL;ZESTRIL) 10 MG tablet; Take 1 tablet by mouth daily  -     Lipid Panel; Future  -     Comprehensive Metabolic Panel; Future  -     CBC with Auto Differential; Future      Orders Placed This Encounter   Procedures    Lipid Panel     Standing Status:   Future     Number of Occurrences:   1     Standing Expiration Date:   3/1/2023     Order Specific Question:   Is Patient Fasting?/# of Hours     Answer:   y    Comprehensive Metabolic Panel     Standing Status:   Future     Number of Occurrences:   1     Standing Expiration Date:   3/1/2023    CBC with Auto Differential     Standing Status:   Future     Number of Occurrences:   1     Standing Expiration Date:   9/1/2023     Orders Placed This Encounter   Medications    lisinopril (PRINIVIL;ZESTRIL) 10 MG tablet     Sig: Take 1 tablet by mouth daily     Dispense:  30 tablet     Refill:  5     Medications Discontinued During This Encounter   Medication Reason    lisinopril (PRINIVIL;ZESTRIL) 5 MG tablet REORDER     Return in about 4 weeks (around 3/29/2022). Reviewed with the patient: current clinical status, medications, activities and diet. Side effects, adverse effects of the medication prescribed today, as well as treatment plan/ rationale and result expectations have been discussed with the patient who expresses understanding and desires to proceed. Close follow up to evaluate treatment results and for coordination of care. I have reviewed the patient's medical history in detail and updated the computerized patient record.     NATASHA Brown - CNP

## 2022-03-11 NOTE — RESULT ENCOUNTER NOTE
High cholesterol. Please call and I will send the patient Lipitor 10 mg PO Nightly to the pharmacy of his choice. Then order repeat LIPIDs and CMP in 6 weeks. Low fat and high fiber diet.  Let me know what pharmacy

## 2022-03-15 DIAGNOSIS — E78.2 MIXED HYPERLIPIDEMIA: Primary | ICD-10-CM

## 2022-03-15 DIAGNOSIS — I10 PRIMARY HYPERTENSION: ICD-10-CM

## 2022-03-15 RX ORDER — ATORVASTATIN CALCIUM 10 MG/1
10 TABLET, FILM COATED ORAL DAILY
Qty: 30 TABLET | Refills: 5 | Status: SHIPPED | OUTPATIENT
Start: 2022-03-15 | End: 2022-10-04

## 2022-03-15 RX ORDER — LISINOPRIL 20 MG/1
20 TABLET ORAL DAILY
Qty: 30 TABLET | Refills: 11 | Status: SHIPPED | OUTPATIENT
Start: 2022-03-15

## 2022-04-05 ENCOUNTER — OFFICE VISIT (OUTPATIENT)
Dept: FAMILY MEDICINE CLINIC | Age: 47
End: 2022-04-05
Payer: MEDICAID

## 2022-04-05 VITALS
SYSTOLIC BLOOD PRESSURE: 130 MMHG | RESPIRATION RATE: 16 BRPM | DIASTOLIC BLOOD PRESSURE: 80 MMHG | HEART RATE: 80 BPM | WEIGHT: 179 LBS | BODY MASS INDEX: 26.51 KG/M2 | HEIGHT: 69 IN

## 2022-04-05 DIAGNOSIS — E78.2 MIXED HYPERLIPIDEMIA: ICD-10-CM

## 2022-04-05 DIAGNOSIS — I10 PRIMARY HYPERTENSION: Primary | ICD-10-CM

## 2022-04-05 DIAGNOSIS — R07.9 INTERMITTENT CHEST PAIN: ICD-10-CM

## 2022-04-05 PROCEDURE — 4004F PT TOBACCO SCREEN RCVD TLK: CPT | Performed by: NURSE PRACTITIONER

## 2022-04-05 PROCEDURE — G8427 DOCREV CUR MEDS BY ELIG CLIN: HCPCS | Performed by: NURSE PRACTITIONER

## 2022-04-05 PROCEDURE — G8419 CALC BMI OUT NRM PARAM NOF/U: HCPCS | Performed by: NURSE PRACTITIONER

## 2022-04-05 PROCEDURE — 99214 OFFICE O/P EST MOD 30 MIN: CPT | Performed by: NURSE PRACTITIONER

## 2022-04-05 ASSESSMENT — ENCOUNTER SYMPTOMS
ORTHOPNEA: 0
NAUSEA: 0
ABDOMINAL PAIN: 0
SHORTNESS OF BREATH: 0
BLURRED VISION: 0
VOMITING: 0
COUGH: 0
SPUTUM PRODUCTION: 0
BACK PAIN: 0
HEMOPTYSIS: 0

## 2022-04-05 NOTE — PROGRESS NOTES
Subjective  Jacqui Carvalho, 51 y.o. female presents today with:  Chief Complaint   Patient presents with    Hypertension     4 wks f/u on increase in lisinopril to 20 mg    Hyperlipidemia     started on lipitor         Hypertension  This is a new problem. The current episode started more than 1 month ago. The problem has been gradually improving since onset. Associated symptoms include anxiety. Pertinent negatives include no blurred vision, chest pain, headaches, malaise/fatigue, neck pain, orthopnea, palpitations, peripheral edema, PND, shortness of breath or sweats. Past treatments include ACE inhibitors. The current treatment provides moderate improvement. There are no compliance problems. Hyperlipidemia  Pertinent negatives include no chest pain, leg pain or shortness of breath. Chest Pain   This is a recurrent problem. The current episode started more than 1 month ago. The onset quality is sudden. The problem occurs intermittently. The problem has been unchanged. The pain is present in the lateral region (left side). Pain severity now: none currently. The pain does not radiate. Pertinent negatives include no abdominal pain, back pain, claudication, cough, diaphoresis, dizziness, exertional chest pressure, fever, headaches, hemoptysis, irregular heartbeat, leg pain, lower extremity edema, malaise/fatigue, nausea, near-syncope, numbness, orthopnea, palpitations, PND, shortness of breath, sputum production, syncope, vomiting or weakness. Her past medical history is significant for hyperlipidemia. Review of Systems   Constitutional: Negative for diaphoresis, fever and malaise/fatigue. Eyes: Negative for blurred vision. Respiratory: Negative for cough, hemoptysis, sputum production and shortness of breath. Cardiovascular: Negative for chest pain, palpitations, orthopnea, claudication, syncope, PND and near-syncope. Gastrointestinal: Negative for abdominal pain, nausea and vomiting. Musculoskeletal: Negative for back pain and neck pain. Neurological: Negative for dizziness, weakness, numbness and headaches. Past Medical History:   Diagnosis Date    Depression      Past Surgical History:   Procedure Laterality Date    BREAST ENHANCEMENT SURGERY       Social History     Socioeconomic History    Marital status: Single     Spouse name: Not on file    Number of children: Not on file    Years of education: Not on file    Highest education level: Not on file   Occupational History    Not on file   Tobacco Use    Smoking status: Current Every Day Smoker     Packs/day: 1.00     Types: Cigarettes    Smokeless tobacco: Never Used   Vaping Use    Vaping Use: Never used   Substance and Sexual Activity    Alcohol use: Yes     Alcohol/week: 4.0 standard drinks     Types: 4 Glasses of wine per week    Drug use: No    Sexual activity: Not on file   Other Topics Concern    Not on file   Social History Narrative    Not on file     Social Determinants of Health     Financial Resource Strain: Low Risk     Difficulty of Paying Living Expenses: Not hard at all   Food Insecurity: No Food Insecurity    Worried About Running Out of Food in the Last Year: Never true    [address] in the Last Year: Never true   Transportation Needs:     Lack of Transportation (Medical): Not on file    Lack of Transportation (Non-Medical):  Not on file   Physical Activity:     Days of Exercise per Week: Not on file    Minutes of Exercise per Session: Not on file   Stress:     Feeling of Stress : Not on file   Social Connections:     Frequency of Communication with Friends and Family: Not on file    Frequency of Social Gatherings with Friends and Family: Not on file    Attends Spiritism Services: Not on file    Active Member of Clubs or Organizations: Not on file    Attends Club or Organization Meetings: Not on file    Marital Status: Not on file   Intimate Partner Violence:     Fear of Current or Ex-Partner: Not on file    Emotionally Abused: Not on file    Physically Abused: Not on file    Sexually Abused: Not on file   Housing Stability:     Unable to Pay for Housing in the Last Year: Not on file    Number of Places Lived in the Last Year: Not on file    Unstable Housing in the Last Year: Not on file     No family history on file. No Known Allergies  Current Outpatient Medications   Medication Sig Dispense Refill    lisinopril (PRINIVIL;ZESTRIL) 20 MG tablet Take 1 tablet by mouth daily 30 tablet 11    atorvastatin (LIPITOR) 10 MG tablet Take 1 tablet by mouth daily 30 tablet 5    tiZANidine (ZANAFLEX) 4 MG tablet Take 1 tablet by mouth every 8 hours as needed (back pain/spasm) 21 tablet 0    etodolac (LODINE) 400 MG tablet Take 1 tablet by mouth 2 times daily For pain 14 tablet 0    albuterol sulfate HFA (PROVENTIL HFA) 108 (90 Base) MCG/ACT inhaler Inhale 2 puffs into the lungs every 6 hours as needed for Wheezing 1 Inhaler 3    divalproex (DEPAKOTE ER) 250 MG extended release tablet Take 250 mg by mouth daily      medroxyPROGESTERone (PROVERA) 10 MG tablet Take 1 tablet by mouth daily 10 tablet 0    levonorgestrel (MIRENA) IUD 52 mg 1 each by Intrauterine route once for 1 dose 1 each 0    hydrOXYzine (VISTARIL) 50 MG capsule Take 50 mg by mouth 3 times daily as needed       Current Facility-Administered Medications   Medication Dose Route Frequency Provider Last Rate Last Admin    levonorgestrel (MIRENA) IUD 52 mg 1 each  1 each IntraUTERine Once Kell Turner, NATASHA - CNP   1 each at 07/13/20 4872     PMH, Surgical Hx, Family Hx, and Social Hx reviewed and updated. Health Maintenance reviewed. Objective    Vitals:    04/05/22 1559   BP: 130/80   Pulse: 80   Resp: 16   Weight: 179 lb (81.2 kg)   Height: 5' 9\" (1.753 m)       Physical Exam  Constitutional:       General: She is not in acute distress. Appearance: She is well-developed.    HENT:      Head: Normocephalic and atraumatic. Right Ear: Tympanic membrane and external ear normal.      Left Ear: Tympanic membrane and external ear normal.      Nose: Nose normal.   Eyes:      Conjunctiva/sclera: Conjunctivae normal.      Pupils: Pupils are equal, round, and reactive to light. Neck:      Vascular: No JVD. Cardiovascular:      Rate and Rhythm: Normal rate and regular rhythm. Heart sounds: Normal heart sounds. No murmur heard. Pulmonary:      Effort: Pulmonary effort is normal. No respiratory distress. Breath sounds: Normal breath sounds. No wheezing or rales. Abdominal:      General: Bowel sounds are normal. There is no distension. Palpations: Abdomen is soft. There is no mass. Tenderness: There is no abdominal tenderness. Musculoskeletal:      Cervical back: Neck supple. Lymphadenopathy:      Cervical: No cervical adenopathy. Skin:     General: Skin is warm and dry. Neurological:      Mental Status: She is alert and oriented to person, place, and time. Assessment & Plan   [facility] was seen today for hypertension and hyperlipidemia. Diagnoses and all orders for this visit:    Primary hypertension    Intermittent chest pain  -     [facility] [place], DO, Invasive Cardiology, [place]    Mixed hyperlipidemia      Orders Placed This Encounter   Procedures   [address], [place], DO, Invasive Cardiology, [place]     Referral Priority:   Routine     Referral Type:   Eval and Treat     Referral Reason:   Specialty Services Required     Requested Specialty:   Cardiology     Number of Visits Requested:   1     No orders of the defined types were placed in this encounter. There are no discontinued medications. Return in about 6 months (around 10/5/2022). Reviewed with the patient: current clinical status, medications, activities and diet.      Side effects, adverse effects of the medication prescribed today, as well as treatment plan/ rationale and result expectations have been discussed with the patient who expresses understanding and desires to proceed. Close follow up to evaluate treatment results and for coordination of care. I have reviewed the patient's medical history in detail and updated the computerized patient record.     NATASHA Casillas - CNP

## 2022-04-15 ENCOUNTER — TELEPHONE (OUTPATIENT)
Dept: FAMILY MEDICINE CLINIC | Age: 47
End: 2022-04-15

## 2022-04-15 NOTE — TELEPHONE ENCOUNTER
Pt called and was concerned about some of the medicines she is prescribed when she messed up her back. I scheduled an appt for her to discuss but that's not until 05/09/22. Would like a call if possible to discuss the meds.  Please advise

## 2022-05-09 ENCOUNTER — TELEPHONE (OUTPATIENT)
Dept: FAMILY MEDICINE CLINIC | Age: 47
End: 2022-05-09

## 2022-05-09 ENCOUNTER — TELEMEDICINE (OUTPATIENT)
Dept: FAMILY MEDICINE CLINIC | Age: 47
End: 2022-05-09
Payer: MEDICAID

## 2022-05-09 PROCEDURE — G8419 CALC BMI OUT NRM PARAM NOF/U: HCPCS | Performed by: NURSE PRACTITIONER

## 2022-05-09 PROCEDURE — 4004F PT TOBACCO SCREEN RCVD TLK: CPT | Performed by: NURSE PRACTITIONER

## 2022-05-09 PROCEDURE — 99213 OFFICE O/P EST LOW 20 MIN: CPT | Performed by: NURSE PRACTITIONER

## 2022-05-09 PROCEDURE — G8427 DOCREV CUR MEDS BY ELIG CLIN: HCPCS | Performed by: NURSE PRACTITIONER

## 2022-05-09 ASSESSMENT — ENCOUNTER SYMPTOMS
ALLERGIC/IMMUNOLOGIC NEGATIVE: 1
DIARRHEA: 0
CONSTIPATION: 0
BLOOD IN STOOL: 0
COLOR CHANGE: 0
TROUBLE SWALLOWING: 0
RECTAL PAIN: 0
ANAL BLEEDING: 0
GASTROINTESTINAL NEGATIVE: 1
RESPIRATORY NEGATIVE: 1
ABDOMINAL PAIN: 0
EYES NEGATIVE: 1
SHORTNESS OF BREATH: 0
VOICE CHANGE: 0

## 2022-05-09 NOTE — PROGRESS NOTES
Cyntha Klinefelter (:  1975) is a Established patient, here for evaluation of the following:    Assessment & Plan   Below is the assessment and plan developed based on review of pertinent history, physical exam, labs, studies, and medications. 1. BMI 27.0-27.9,adult  -     naltrexone-buPROPion (CONTRAVE) 8-90 MG per extended release tablet; Take 2 tablets by mouth 2 times daily, Disp-120 tablet, R-1Normal    Return in about 4 weeks (around 2022). Subjective   HPI   Wants to try contrave  BMI 27  Weight 185  HTN controlled  Review of Systems   Constitutional: Negative. Negative for activity change, appetite change, fatigue and unexpected weight change. HENT: Negative. Negative for dental problem, nosebleeds, trouble swallowing and voice change. Eyes: Negative. Negative for visual disturbance. Respiratory: Negative. Negative for shortness of breath. Cardiovascular: Negative. Negative for chest pain, palpitations and leg swelling. Gastrointestinal: Negative. Negative for abdominal pain, anal bleeding, blood in stool, constipation, diarrhea and rectal pain. Endocrine: Negative. Negative for cold intolerance, heat intolerance, polydipsia, polyphagia and polyuria. Genitourinary: Negative. Musculoskeletal: Negative. Skin: Negative. Negative for color change and rash. Allergic/Immunologic: Negative. Neurological: Negative. Negative for dizziness, syncope, weakness and headaches. Hematological: Negative. Negative for adenopathy. Does not bruise/bleed easily. Psychiatric/Behavioral: Negative. Negative for dysphoric mood and sleep disturbance. The patient is not nervous/anxious.            Objective   Patient-Reported Vitals  Patient-Reported Weight: 185 lbs       Physical Exam  [INSTRUCTIONS:  \"[x]\" Indicates a positive item  \"[]\" Indicates a negative item  -- DELETE ALL ITEMS NOT EXAMINED]    Constitutional: [x] Appears well-developed and well-nourished [x] No apparent distress      [] Abnormal -     Mental status: [x] Alert and awake  [x] Oriented to person/place/time [x] Able to follow commands    [] Abnormal -     Eyes:   EOM    [x]  Normal    [] Abnormal -   Sclera  [x]  Normal    [] Abnormal -          Discharge [x]  None visible   [] Abnormal -     HENT: [x] Normocephalic, atraumatic  [] Abnormal -   [x] Mouth/Throat: Mucous membranes are moist    External Ears [x] Normal  [] Abnormal -    Neck: [x] No visualized mass [] Abnormal -     Pulmonary/Chest: [x] Respiratory effort normal   [x] No visualized signs of difficulty breathing or respiratory distress        [] Abnormal -      Musculoskeletal:   [x] Normal gait with no signs of ataxia         [x] Normal range of motion of neck        [] Abnormal -     Neurological:        [x] No Facial Asymmetry (Cranial nerve 7 motor function) (limited exam due to video visit)          [x] No gaze palsy        [] Abnormal -          Skin:        [x] No significant exanthematous lesions or discoloration noted on facial skin         [] Abnormal -            Psychiatric:       [x] Normal Affect [] Abnormal -        [x] No Hallucinations    Other pertinent observable physical exam findings:-             On this date 5/9/2022 I have spent 20 minutes reviewing previous notes, test results and face to face (virtual) with the patient discussing the diagnosis and importance of compliance with the treatment plan as well as documenting on the day of the visit. Oliverio Cody, was evaluated through a synchronous (real-time) audio-video encounter. The patient (or guardian if applicable) is aware that this is a billable service, which includes applicable co-pays. This Virtual Visit was conducted with patient's (and/or legal guardian's) consent.  The visit was conducted pursuant to the emergency declaration under the [facility] waiver authority and the [employer] Appropriations Act. Patient identification was verified, and a caregiver was present when appropriate. The patient was located at home in a state where the provider was licensed to provide care.        --NATASHA Joshi - CNP

## 2022-06-22 NOTE — TELEPHONE ENCOUNTER
Please advise:    Rx request   Requested Prescriptions     Pending Prescriptions Disp Refills    hydrOXYzine pamoate (VISTARIL) 50 MG capsule       Sig: Take 1 capsule by mouth 3 times daily as needed     LOV 5/9/2022  Next Visit Date:  Future Appointments   Date Time Provider Hi Cook   8/5/2022 11:00 AM Trini Harrell, [address]

## 2022-06-27 RX ORDER — HYDROXYZINE PAMOATE 50 MG/1
50 CAPSULE ORAL 3 TIMES DAILY PRN
Qty: 90 CAPSULE | Refills: 1 | Status: SHIPPED | OUTPATIENT
Start: 2022-06-27 | End: 2022-11-01 | Stop reason: ALTCHOICE

## 2022-08-09 ENCOUNTER — OFFICE VISIT (OUTPATIENT)
Dept: FAMILY MEDICINE CLINIC | Age: 47
End: 2022-08-09
Payer: MEDICAID

## 2022-08-09 VITALS
DIASTOLIC BLOOD PRESSURE: 78 MMHG | TEMPERATURE: 97.3 F | BODY MASS INDEX: 26.81 KG/M2 | WEIGHT: 181 LBS | SYSTOLIC BLOOD PRESSURE: 122 MMHG | HEIGHT: 69 IN | HEART RATE: 81 BPM | OXYGEN SATURATION: 98 %

## 2022-08-09 DIAGNOSIS — M54.50 ACUTE BILATERAL LOW BACK PAIN WITHOUT SCIATICA: Primary | ICD-10-CM

## 2022-08-09 PROCEDURE — G8419 CALC BMI OUT NRM PARAM NOF/U: HCPCS | Performed by: NURSE PRACTITIONER

## 2022-08-09 PROCEDURE — 4004F PT TOBACCO SCREEN RCVD TLK: CPT | Performed by: NURSE PRACTITIONER

## 2022-08-09 PROCEDURE — 99213 OFFICE O/P EST LOW 20 MIN: CPT | Performed by: NURSE PRACTITIONER

## 2022-08-09 PROCEDURE — G8427 DOCREV CUR MEDS BY ELIG CLIN: HCPCS | Performed by: NURSE PRACTITIONER

## 2022-08-09 RX ORDER — METHYLPREDNISOLONE 4 MG/1
TABLET ORAL
Qty: 1 KIT | Refills: 0 | Status: SHIPPED | OUTPATIENT
Start: 2022-08-09 | End: 2022-08-15

## 2022-08-09 RX ORDER — CYCLOBENZAPRINE HCL 10 MG
10 TABLET ORAL 2 TIMES DAILY PRN
Qty: 20 TABLET | Refills: 0 | Status: SHIPPED | OUTPATIENT
Start: 2022-08-09 | End: 2022-08-19

## 2022-08-09 RX ORDER — KETOROLAC TROMETHAMINE 30 MG/ML
60 INJECTION, SOLUTION INTRAMUSCULAR; INTRAVENOUS ONCE
Status: COMPLETED | OUTPATIENT
Start: 2022-08-09 | End: 2022-08-09

## 2022-08-09 RX ADMIN — KETOROLAC TROMETHAMINE 60 MG: 30 INJECTION, SOLUTION INTRAMUSCULAR; INTRAVENOUS at 10:11

## 2022-08-09 ASSESSMENT — ENCOUNTER SYMPTOMS
SINUS PAIN: 0
NAUSEA: 0
RHINORRHEA: 0
CHEST TIGHTNESS: 0
SORE THROAT: 0
SINUS PRESSURE: 0
WHEEZING: 0
ABDOMINAL DISTENTION: 0
SHORTNESS OF BREATH: 0
CONSTIPATION: 0
VOMITING: 0
BOWEL INCONTINENCE: 0
ABDOMINAL PAIN: 0
BACK PAIN: 1
COLOR CHANGE: 0
COUGH: 0
TROUBLE SWALLOWING: 0
DIARRHEA: 0

## 2022-08-09 NOTE — PROGRESS NOTES
[address] [place] [address] [place] Encounter      [facility]       Chief Complaint   Patient presents with    Back Pain     Patient states moving beds back pain since last Thursday sharp pain in lower back been icing back nothing helps. Nurses Notes reviewed and I agree except as noted in the HPI. HISTORY OF PRESENT ILLNESS   Ena Moss is a 51 y.o. female who presents   Back Pain  This is a new problem. The current episode started in the past 7 days. The problem occurs constantly. The problem has been gradually worsening since onset. The pain is present in the lumbar spine. The quality of the pain is described as aching and cramping. The pain is at a severity of 7/10. The pain is The same all the time. The symptoms are aggravated by bending, standing, twisting, sitting and position. Stiffness is present All day. Pertinent negatives include no abdominal pain, bladder incontinence, bowel incontinence, chest pain, dysuria, fever, headaches, leg pain, numbness, paresis, paresthesias, pelvic pain, perianal numbness, tingling, weakness or weight loss. She has tried ice, NSAIDs, bed rest and analgesics for the symptoms. The treatment provided no relief. REVIEW OF SYSTEMS     Review of Systems   Constitutional:  Negative for activity change, appetite change, chills, diaphoresis, fatigue, fever and weight loss. HENT:  Negative for congestion, ear pain, postnasal drip, rhinorrhea, sinus pressure, sinus pain, sore throat and trouble swallowing. Eyes:  Negative for visual disturbance. Respiratory:  Negative for cough, chest tightness, shortness of breath and wheezing. Cardiovascular:  Negative for chest pain and palpitations. Gastrointestinal:  Negative for abdominal distention, abdominal pain, bowel incontinence, constipation, diarrhea, nausea and vomiting.    Genitourinary:  Negative for bladder incontinence, difficulty urinating, dysuria, flank pain, frequency, pelvic BP: 122/78, Temp: 97.3 °F (36.3 °C), Heart Rate: 81,  , SpO2: 98 %  Physical Exam  Constitutional:       General: She is not in acute distress. Appearance: Normal appearance. She is normal weight. She is not ill-appearing, toxic-appearing or diaphoretic. HENT:      Head: Normocephalic and atraumatic. Right Ear: External ear normal.      Left Ear: External ear normal.   Eyes:      General:         Right eye: No discharge. Left eye: No discharge. Conjunctiva/sclera: Conjunctivae normal.      Pupils: Pupils are equal, round, and reactive to light. Cardiovascular:      Rate and Rhythm: Normal rate and regular rhythm. Pulses: Normal pulses. Pulmonary:      Effort: Pulmonary effort is normal.      Breath sounds: Normal breath sounds. Abdominal:      General: There is no distension. Palpations: Abdomen is soft. Tenderness: There is no abdominal tenderness. Musculoskeletal:         General: Tenderness present. No swelling, deformity or signs of injury. Normal range of motion. Cervical back: Normal range of motion and neck supple. No rigidity or tenderness. Right lower leg: No edema. Left lower leg: No edema. Skin:     General: Skin is warm and dry. Capillary Refill: Capillary refill takes less than 2 seconds. Neurological:      General: No focal deficit present. Mental Status: She is alert and oriented to person, place, and time. Mental status is at baseline. Cranial Nerves: No cranial nerve deficit. Sensory: No sensory deficit. Motor: No weakness. Coordination: Coordination normal.       DIAGNOSTICRESULTS   Labs:       IMAGING:    URGENT CARE COURSE:     Vitals:    08/09/22 0908   BP: 122/78   Pulse: 81   Temp: 97.3 °F (36.3 °C)   TempSrc: Infrared   SpO2: 98%   Weight: 181 lb (82.1 kg)   Height: 5' 9\" (1.753 m)         PROCEDURES:  None  FINAL IMPRESSION      1.  Acute bilateral low back pain without sciatica DISPOSITION/PLAN   DISPOSITION      PATIENT REFERRED TO:  Return in about 1 week (around 8/16/2022) for follow up with PCP. DISCHARGE MEDICATIONS:  New Prescriptions    CYCLOBENZAPRINE (FLEXERIL) 10 MG TABLET    Take 1 tablet by mouth 2 times daily as needed for Muscle spasms    METHYLPREDNISOLONE (MEDROL DOSEPACK) 4 MG TABLET    Take by mouth. Cannot display discharge medications since this is not an admission.       Mayra Gallegos, APRN - CNP

## 2022-09-25 ENCOUNTER — APPOINTMENT (OUTPATIENT)
Dept: GENERAL RADIOLOGY | Age: 47
End: 2022-09-25
Payer: MEDICAID

## 2022-09-25 ENCOUNTER — HOSPITAL ENCOUNTER (EMERGENCY)
Age: 47
Discharge: HOME | End: 2022-09-25
Payer: MEDICAID

## 2022-09-25 VITALS
OXYGEN SATURATION: 100 % | SYSTOLIC BLOOD PRESSURE: 130 MMHG | HEART RATE: 93 BPM | RESPIRATION RATE: 18 BRPM | HEIGHT: 69 IN | WEIGHT: 175 LBS | TEMPERATURE: 98.3 F | DIASTOLIC BLOOD PRESSURE: 95 MMHG | BODY MASS INDEX: 25.92 KG/M2
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
## 2022-09-25 DIAGNOSIS — S53.105A CLOSED DISLOCATION OF LEFT ELBOW, INITIAL ENCOUNTER: Primary | ICD-10-CM

## 2022-09-25 PROCEDURE — 96374 THER/PROPH/DIAG INJ IV PUSH: CPT

## 2022-09-25 PROCEDURE — 2580000003 HC RX 258: Performed by: PHYSICIAN ASSISTANT

## 2022-09-25 PROCEDURE — 73080 X-RAY EXAM OF ELBOW: CPT

## 2022-09-25 PROCEDURE — 96375 TX/PRO/DX INJ NEW DRUG ADDON: CPT

## 2022-09-25 PROCEDURE — 94761 N-INVAS EAR/PLS OXIMETRY MLT: CPT

## 2022-09-25 PROCEDURE — 99285 EMERGENCY DEPT VISIT HI MDM: CPT

## 2022-09-25 PROCEDURE — 99152 MOD SED SAME PHYS/QHP 5/>YRS: CPT

## 2022-09-25 PROCEDURE — 24600 TX CLSD ELBOW DISLC W/O ANES: CPT

## 2022-09-25 PROCEDURE — 6370000000 HC RX 637 (ALT 250 FOR IP): Performed by: PHYSICIAN ASSISTANT

## 2022-09-25 PROCEDURE — 99153 MOD SED SAME PHYS/QHP EA: CPT

## 2022-09-25 PROCEDURE — 2500000003 HC RX 250 WO HCPCS: Performed by: PHYSICIAN ASSISTANT

## 2022-09-25 PROCEDURE — 73070 X-RAY EXAM OF ELBOW: CPT

## 2022-09-25 PROCEDURE — 6360000002 HC RX W HCPCS: Performed by: PHYSICIAN ASSISTANT

## 2022-09-25 RX ORDER — MORPHINE SULFATE 2 MG/ML
2 INJECTION, SOLUTION INTRAMUSCULAR; INTRAVENOUS ONCE
Status: COMPLETED | OUTPATIENT
Start: 2022-09-25 | End: 2022-09-25

## 2022-09-25 RX ORDER — KETOROLAC TROMETHAMINE 15 MG/ML
15 INJECTION, SOLUTION INTRAMUSCULAR; INTRAVENOUS ONCE
Status: COMPLETED | OUTPATIENT
Start: 2022-09-25 | End: 2022-09-25

## 2022-09-25 RX ORDER — ACETAMINOPHEN 500 MG
500-1000 TABLET ORAL EVERY 6 HOURS PRN
Qty: 50 TABLET | Refills: 0 | Status: SHIPPED | OUTPATIENT
Start: 2022-09-25 | End: 2022-09-25 | Stop reason: SDUPTHER

## 2022-09-25 RX ORDER — ETOMIDATE 2 MG/ML
20 INJECTION INTRAVENOUS ONCE
Status: COMPLETED | OUTPATIENT
Start: 2022-09-25 | End: 2022-09-25

## 2022-09-25 RX ORDER — OXYCODONE HYDROCHLORIDE AND ACETAMINOPHEN 5; 325 MG/1; MG/1
1 TABLET ORAL ONCE
Status: COMPLETED | OUTPATIENT
Start: 2022-09-25 | End: 2022-09-25

## 2022-09-25 RX ORDER — OXYCODONE HYDROCHLORIDE 5 MG/1
2.5-5 TABLET ORAL EVERY 6 HOURS PRN
Qty: 6 TABLET | Refills: 0 | Status: SHIPPED | OUTPATIENT
Start: 2022-09-25 | End: 2022-09-25 | Stop reason: SDUPTHER

## 2022-09-25 RX ORDER — OXYCODONE HYDROCHLORIDE 5 MG/1
2.5-5 TABLET ORAL EVERY 6 HOURS PRN
Qty: 6 TABLET | Refills: 0 | Status: SHIPPED | OUTPATIENT
Start: 2022-09-25 | End: 2022-09-28

## 2022-09-25 RX ORDER — ACETAMINOPHEN 500 MG
500-1000 TABLET ORAL EVERY 6 HOURS PRN
Qty: 50 TABLET | Refills: 0 | Status: SHIPPED | OUTPATIENT
Start: 2022-09-25 | End: 2022-11-01 | Stop reason: ALTCHOICE

## 2022-09-25 RX ORDER — 0.9 % SODIUM CHLORIDE 0.9 %
500 INTRAVENOUS SOLUTION INTRAVENOUS ONCE
Status: COMPLETED | OUTPATIENT
Start: 2022-09-25 | End: 2022-09-25

## 2022-09-25 RX ORDER — HYDROCODONE BITARTRATE AND ACETAMINOPHEN 5; 325 MG/1; MG/1
1 TABLET ORAL ONCE
Status: DISCONTINUED | OUTPATIENT
Start: 2022-09-25 | End: 2022-09-25

## 2022-09-25 RX ORDER — MORPHINE SULFATE 4 MG/ML
4 INJECTION, SOLUTION INTRAMUSCULAR; INTRAVENOUS ONCE
Status: DISCONTINUED | OUTPATIENT
Start: 2022-09-25 | End: 2022-09-25

## 2022-09-25 RX ORDER — IBUPROFEN 600 MG/1
600 TABLET ORAL EVERY 6 HOURS PRN
Qty: 120 TABLET | Refills: 0 | Status: SHIPPED | OUTPATIENT
Start: 2022-09-25

## 2022-09-25 RX ORDER — IBUPROFEN 600 MG/1
600 TABLET ORAL EVERY 6 HOURS PRN
Qty: 120 TABLET | Refills: 0 | Status: SHIPPED | OUTPATIENT
Start: 2022-09-25 | End: 2022-09-25 | Stop reason: SDUPTHER

## 2022-09-25 RX ADMIN — OXYCODONE AND ACETAMINOPHEN 1 TABLET: 5; 325 TABLET ORAL at 19:29

## 2022-09-25 RX ADMIN — MORPHINE SULFATE 2 MG: 2 INJECTION, SOLUTION INTRAMUSCULAR; INTRAVENOUS at 21:43

## 2022-09-25 RX ADMIN — ETOMIDATE 20 MG: 2 INJECTION INTRAVENOUS at 19:56

## 2022-09-25 RX ADMIN — SODIUM CHLORIDE 500 ML: 9 INJECTION, SOLUTION INTRAVENOUS at 20:23

## 2022-09-25 RX ADMIN — KETOROLAC TROMETHAMINE 15 MG: 15 INJECTION, SOLUTION INTRAMUSCULAR; INTRAVENOUS at 20:24

## 2022-09-25 ASSESSMENT — PAIN DESCRIPTION - LOCATION
LOCATION: ELBOW
LOCATION: ARM

## 2022-09-25 ASSESSMENT — PAIN DESCRIPTION - ORIENTATION
ORIENTATION: LEFT
ORIENTATION: LEFT

## 2022-09-25 ASSESSMENT — PAIN DESCRIPTION - FREQUENCY: FREQUENCY: CONTINUOUS

## 2022-09-25 ASSESSMENT — PAIN SCALES - GENERAL
PAINLEVEL_OUTOF10: 10
PAINLEVEL_OUTOF10: 10
PAINLEVEL_OUTOF10: 8

## 2022-09-25 ASSESSMENT — PAIN DESCRIPTION - DESCRIPTORS
DESCRIPTORS: ACHING
DESCRIPTORS: ACHING

## 2022-09-25 ASSESSMENT — PAIN DESCRIPTION - PAIN TYPE: TYPE: ACUTE PAIN

## 2022-09-25 ASSESSMENT — PAIN - FUNCTIONAL ASSESSMENT
PAIN_FUNCTIONAL_ASSESSMENT: ACTIVITIES ARE NOT PREVENTED
PAIN_FUNCTIONAL_ASSESSMENT: 0-10

## 2022-09-25 NOTE — ED PROVIDER NOTES
[address] [facility] ED  [facility]      Pt Name: Max Velazquez  MRN: [id number]  [place] 1975  Date ofevaluation: 9/25/2022  Provider: Sheryl Partida PA-C    CHIEF COMPLAINT       Chief Complaint   Patient presents with    Arm Injury     Left arm          HISTORY OF PRESENT ILLNESS   (Location/Symptom, Timing/Onset,Context/Setting, Quality, Duration, Modifying Factors, Severity)  Note limiting factors. This is a 51year old female with PMHx of bipolar disorder, alcohol use, and JAIME presenting with left elbow pain and swelling after a fall late last night. The patient states that around 4 AM this morning she slipped and landed on her left elbow. She didn't think much of it and went to sleep and when she awoke, her arm was bruised and swollen. The patient describes the pain as a 10/10, constant, sore, aggravated with movement (although very limited), and palpation, no reported alleviating factors, associated with bruising, swelling, and tingling of the left thumb, not reported with ipsilateral wrist or shoulder pain, head injury or LOC, and she denies any injuries elsewhere. NursingNotes were reviewed. REVIEW OF SYSTEMS    (2-9 systems for level 4, 10 or more for level 5)     Review of Systems   Musculoskeletal:  Positive for arthralgias and joint swelling. Skin:  Positive for color change. Neurological:  Positive for numbness. Negative for weakness and headaches. Hematological:  Does not bruise/bleed easily. All other systems reviewed and are negative. Except as noted above the remainder of the review of systems was reviewed and negative.        PAST MEDICAL HISTORY     Past Medical History:   Diagnosis Date    Depression          SURGICALHISTORY       Past Surgical History:   Procedure Laterality Date    BREAST ENHANCEMENT SURGERY           CURRENT MEDICATIONS       Discharge Medication List as of 9/25/2022  9:39 PM        CONTINUE these medications which have NOT CHANGED    Details   hydrOXYzine pamoate (VISTARIL) 50 MG capsule Take 1 capsule by mouth 3 times daily as needed for Anxiety, Disp-90 capsule, R-1Normal      naltrexone-buPROPion (CONTRAVE) 8-90 MG per extended release tablet Take 2 tablets by mouth 2 times daily, Disp-120 tablet, R-1Normal      lisinopril (PRINIVIL;ZESTRIL) 20 MG tablet Take 1 tablet by mouth daily, Disp-30 tablet, R-11Normal      atorvastatin (LIPITOR) 10 MG tablet Take 1 tablet by mouth daily, Disp-30 tablet, R-5Normal      tiZANidine (ZANAFLEX) 4 MG tablet Take 1 tablet by mouth every 8 hours as needed (back pain/spasm), Disp-21 tablet, R-0Normal      etodolac (LODINE) 400 MG tablet Take 1 tablet by mouth 2 times daily For pain, Disp-14 tablet, R-0Normal      albuterol sulfate HFA (PROVENTIL HFA) 108 (90 Base) MCG/ACT inhaler Inhale 2 puffs into the lungs every 6 hours as needed for Wheezing, Disp-1 Inhaler,R-3Normal      levonorgestrel (MIRENA) IUD 52 mg 1 each by Intrauterine route once for 1 dose, Intrauterine, ONCE Starting Thu 6/25/2020, For 1 dose, Disp-1 each, R-0, Print      divalproex (DEPAKOTE ER) 250 MG extended release tablet Take 250 mg by mouth dailyHistorical Med      medroxyPROGESTERone (PROVERA) 10 MG tablet Take 1 tablet by mouth daily, Disp-10 tablet, R-0Normal                  Patient has no known allergies. FAMILY HISTORY     History reviewed. No pertinent family history. SOCIAL HISTORY       Social History     Socioeconomic History    Marital status: Single     Spouse name: None    Number of children: None    Years of education: None    Highest education level: None   Tobacco Use    Smoking status: Every Day     Packs/day: 1.00     Types: Cigarettes    Smokeless tobacco: Never   Vaping Use    Vaping Use: Never used   Substance and Sexual Activity    Alcohol use:  Yes     Alcohol/week: 4.0 standard drinks     Types: 4 Glasses of wine per week    Drug use: No     Social Determinants of Health     Financial Resource Strain: Low Risk     Difficulty of Paying Living Expenses: Not hard at all   Food Insecurity: No Food Insecurity    Worried About [address] in the Last Year: Never true    Ran Out of Food in the Last Year: Never true       SCREENINGS    [place] Coma Scale  Eye Opening: Spontaneous  Best Verbal Response: Oriented  Best Motor Response: Obeys commands  [place] Coma Scale Score: 15        PHYSICAL EXAM    (up to 7 for level 4, 8 or more for level 5)     ED Triage Vitals   BP Temp Temp Source Heart Rate Resp SpO2 Height Weight   09/25/22 1717 09/25/22 1713 09/25/22 1713 09/25/22 1713 09/25/22 1713 09/25/22 1713 09/25/22 1713 09/25/22 1713   (!) 143/87 98.3 °F (36.8 °C) Temporal (!) 111 18 97 % 5' 9\" (1.753 m) 175 lb (79.4 kg)       Physical Exam  Vitals and nursing note reviewed. Constitutional:       General: She is not in acute distress. Appearance: Normal appearance. She is normal weight. She is not ill-appearing, toxic-appearing or diaphoretic. HENT:      Head: Normocephalic and atraumatic. Nose: Nose normal. No congestion or rhinorrhea. Mouth/Throat:      Mouth: Mucous membranes are moist.      Pharynx: Oropharynx is clear. Eyes:      Extraocular Movements: Extraocular movements intact. Conjunctiva/sclera: Conjunctivae normal.   Pulmonary:      Effort: Pulmonary effort is normal. No respiratory distress. Musculoskeletal:         General: Swelling, tenderness, deformity and signs of injury present. Right shoulder: Normal.      Left shoulder: Normal.      Left upper arm: Normal.      Left elbow: Swelling and deformity present. Decreased range of motion. Tenderness present in medial epicondyle, lateral epicondyle and olecranon process. Left forearm: Swelling, tenderness and bony tenderness present. Left wrist: Normal.      Left hand: Decreased sensation (to light stimulation of the left thumb alone, deep and painful stimulation intact).  There is no disruption of two-point discrimination. Normal capillary refill. Normal pulse. Cervical back: Normal range of motion and neck supple. Skin:     General: Skin is warm and dry. Capillary Refill: Capillary refill takes less than 2 seconds. Neurological:      Mental Status: She is alert and oriented to person, place, and time. Psychiatric:         Mood and Affect: Mood normal.         Behavior: Behavior normal.       RESULTS     RADIOLOGY:   Non-plain filmimages such as CT, Ultrasound and MRI are read by the radiologist.       Interpretation per the Radiologist below, if available at the time ofthis note:    XR ELBOW LEFT (2 VIEWS)   Final Result   Interval reduction with now anatomic alignment of the left elbow joint. XR ELBOW LEFT (MIN 3 VIEWS)   Final Result   Dislocation at the elbow joint. Post reduction views recommended. LABS:  Labs Reviewed - No data to display    All other labs were within normal range or not returned as of this dictation. EMERGENCY DEPARTMENT COURSE and DIFFERENTIAL DIAGNOSIS/MDM:   Vitals:    Vitals:    09/25/22 2035 09/25/22 2040 09/25/22 2100 09/25/22 2130   BP: 132/89 135/88 120/81 (!) 130/95   Pulse: 85 92 91 93   Resp: 16 17 14 18   Temp:       TempSrc:       SpO2: 100% 100% 100% 100%   Weight:       Height:                MDM  Number of Diagnoses or Management Options  Closed dislocation of left elbow, initial encounter  Fall, initial encounter  Diagnosis management comments: 51year old female presents after a mechanical fall on left elbow resulting in a posteriorly dislocated left elbow. No fx on XR. Conscious sedation initiated with Dr. Jordan Claros assistance and the dislocated elbow was reduced using traction-countertraction with successful reduction. Range of motion and sensation improved. She was placed in a sling and swathe.  She was monitored for some time after the sedation and discharged with pain medication and prompt ortho follow up and ED return precautions.         Amount and/or Complexity of Data Reviewed  Tests in the radiology section of CPT®: ordered and reviewed  Discussion of test results with the performing providers: yes  Decide to obtain previous medical records or to obtain history from someone other than the patient: yes  Review and summarize past medical records: yes  Discuss the patient with other providers: yes  Independent visualization of images, tracings, or specimens: yes    Patient Progress  Patient progress: improved        REASSESSMENT              PROCEDURES:  Unless otherwise noted below, none     Procedural sedation    Date/Time: 9/26/2022 1:05 PM  Performed by: Addi Ho PA-C  Authorized by: Addi Ho PA-C     Consent:     Consent obtained:  Verbal and written    Consent given by:  Patient    Risks, benefits, and alternatives were discussed: yes      Risks discussed:  Prolonged hypoxia resulting in organ damage, prolonged sedation necessitating reversal, respiratory compromise necessitating ventilatory assistance and intubation, vomiting, nausea, dysrhythmia, inadequate sedation and allergic reaction  Universal protocol:     Procedure explained and questions answered to patient or proxy's satisfaction: yes      Imaging studies available: yes      Required blood products, implants, devices, and special equipment available: yes      Immediately prior to procedure, a time out was called: yes      Patient identity confirmed:  Verbally with patient  Indications:     Procedure performed:  Dislocation reduction    Procedure necessitating sedation performed by:  Physician performing sedation    Intended level of sedation:  Moderate  Pre-sedation assessment:     ASA classification: class 1 - normal, healthy patient      Neck mobility: normal      Pre-sedation assessments completed and reviewed: airway patency, cardiovascular function, hydration status, mental status, pain level and respiratory function    Immediate pre-procedure details:     Reviewed: vital signs    Procedure details (see MAR for exact dosages):     Preoxygenation:  Nasal cannula    Sedation:  Etomidate    Intra-procedure monitoring:  Blood pressure monitoring, cardiac monitor, continuous pulse oximetry and frequent vital sign checks    Intra-procedure management:  Supplemental oxygen  Post-procedure details:     Complications:  None    Post-sedation assessments completed and reviewed: airway patency, cardiovascular function, mental status, pain level and respiratory function      Patient is stable for discharge or admission: yes      Procedure completion:  Tolerated well, no immediate complications  Ortho Injury    Date/Time: 9/26/2022 1:09 PM  Performed by: Jose Soria PA-C  Authorized by: Jose Soria PA-C   Consent: Verbal consent obtained. Written consent obtained.   Consent given by: patient  Patient understanding: patient states understanding of the procedure being performed  Patient consent: the patient's understanding of the procedure matches consent given  Procedure consent: procedure consent matches procedure scheduled  Relevant documents: relevant documents present and verified  Test results: test results available and properly labeled  Site marked: the operative site was marked  Imaging studies: imaging studies available  Required items: required blood products, implants, devices, and special equipment available  Patient identity confirmed: verbally with patient  Injury location: elbow  Location details: left elbow  Injury type: dislocation  Dislocation type: posterior  Pre-procedure distal perfusion: normal  Pre-procedure neurological function: diminished  Pre-procedure range of motion: reduced    Anesthesia:  Local anesthesia used: no    Sedation:  Patient sedated: yes  Sedation type: moderate (conscious) sedation  Sedatives: etomidate    Manipulation performed: yes  Reduction method: traction and counter traction and manipulation of proximal ulna  Immobilization: sling  Post-procedure distal perfusion: normal  Post-procedure neurological function: normal  Post-procedure range of motion: improved  Patient tolerance: patient tolerated the procedure well with no immediate complications    Conscious Sedation Procedure Note    Indication: left elbow dislocation    Consent: I have discussed with the patient and/or the patient representative the indication, alternatives, and the possible risks and/or complications of the planned procedure and the anesthesia methods. The patient and/or patient representative appear to understand and agree to proceed. Physician Involvement: The attending physician was present and supervising this procedure. Pre-Sedation Documentation and Exam: I have personally completed a history, physical exam & review of systems for this patient (see notes). Vital signs have been reviewed (see flow sheet for vitals). I have reviewed the patient's history and review of systems. Airway Assessment: normal, dentition not prohibitive, normal neck range of motion, Mallampati Class I - (soft palate, fauces, uvula & anterior/posterior tonsillar pillars are visible)    Prior History of Anesthesia Complications: none    ASA Classification: Class 1 - A normal healthy patient    Sedation/ Anesthesia Plan: intravenous sedation    Medications Used: etomidate intravenously    Monitoring and Safety: The patient was placed on a cardiac monitor and vital signs, pulse oximetry and level of consciousness were continuously evaluated throughout the procedure. The patient was closely monitored until recovery from the medications was complete and the patient had returned to baseline status. Respiratory therapy was on standby at all times during the procedure.     (The following sections must be completed)  Post-Sedation Vital Signs: Vital signs were reviewed and were stable after the procedure (see flow sheet for vitals)            Post-Sedation Exam: Lungs: clear to auscultation bilaterally without crackles or wheezing and Cardiovascular: regular rate and rhythm, no murmurs rubs or gallops           Complications: none        FINAL IMPRESSION      1. Closed dislocation of left elbow, initial encounter    2.  Fall, initial encounter          DISPOSITION/PLAN   DISPOSITION Decision To Discharge 09/25/2022 10:06:08 PM      PATIENT REFERREDTO:  Hazel Siddiqui MD  [address]:  Discharge Medication List as of 9/25/2022  9:39 PM             (Please note that portions of this note were completed with a voice recognition program.  Efforts were made to edit the dictations but occasionally words are mis-transcribed.)    Arnulfo Hall PA-C (electronically signed)  Attending Emergency Physician       Arnulfo Hall PA-C  09/26/22 920 Elise Fry PA-C  10/05/22 [id number]

## 2022-09-26 ENCOUNTER — OFFICE VISIT (OUTPATIENT)
Dept: ORTHOPEDIC SURGERY | Age: 47
End: 2022-09-26
Payer: MEDICAID

## 2022-09-26 VITALS
WEIGHT: 180 LBS | HEART RATE: 107 BPM | OXYGEN SATURATION: 98 % | BODY MASS INDEX: 26.66 KG/M2 | TEMPERATURE: 98 F | HEIGHT: 69 IN

## 2022-09-26 DIAGNOSIS — S53.105A CLOSED DISLOCATION OF LEFT ELBOW, INITIAL ENCOUNTER: Primary | ICD-10-CM

## 2022-09-26 PROCEDURE — G8427 DOCREV CUR MEDS BY ELIG CLIN: HCPCS | Performed by: ORTHOPAEDIC SURGERY

## 2022-09-26 PROCEDURE — 4004F PT TOBACCO SCREEN RCVD TLK: CPT | Performed by: ORTHOPAEDIC SURGERY

## 2022-09-26 PROCEDURE — G8419 CALC BMI OUT NRM PARAM NOF/U: HCPCS | Performed by: ORTHOPAEDIC SURGERY

## 2022-09-26 PROCEDURE — 99204 OFFICE O/P NEW MOD 45 MIN: CPT | Performed by: ORTHOPAEDIC SURGERY

## 2022-09-26 RX ORDER — TRAMADOL HYDROCHLORIDE 50 MG/1
50 TABLET ORAL EVERY 6 HOURS PRN
Qty: 28 TABLET | Refills: 0 | Status: SHIPPED | OUTPATIENT
Start: 2022-09-26 | End: 2022-10-03

## 2022-09-26 ASSESSMENT — ENCOUNTER SYMPTOMS: COLOR CHANGE: 1

## 2022-09-26 NOTE — PROGRESS NOTES
Subjective:      Patient ID: Neelima Can is a 51 y.o. female who presents today for:  Chief Complaint   Patient presents with    ED Follow-up     Pt states she fell early morning on her left elbow & then went to the ER where they performed xray's. Pt states she has a dislocated elbow. She states they ended up putting her elbow back in place & was given sling. HPI  Patient acknowledges fairly severe pain in her left elbow that has significantly improved since subsequent reduction of the elbow and placement into a sling. Patient denies any numbness or tingling of her affected extremity. Denies any new trauma or falls since reduction of the elbow and discharge from the emergency department. Past Medical History:   Diagnosis Date    Depression       Past Surgical History:   Procedure Laterality Date    BREAST ENHANCEMENT SURGERY       Social History     Socioeconomic History    Marital status: Single     Spouse name: Not on file    Number of children: Not on file    Years of education: Not on file    Highest education level: Not on file   Occupational History    Not on file   Tobacco Use    Smoking status: Every Day     Packs/day: 1.00     Types: Cigarettes    Smokeless tobacco: Never   Vaping Use    Vaping Use: Never used   Substance and Sexual Activity    Alcohol use:  Yes     Alcohol/week: 4.0 standard drinks     Types: 4 Glasses of wine per week    Drug use: No    Sexual activity: Not on file   Other Topics Concern    Not on file   Social History Narrative    Not on file     Social Determinants of Health     Financial Resource Strain: Low Risk     Difficulty of Paying Living Expenses: Not hard at all   Food Insecurity: No Food Insecurity    Worried About Running Out of Food in the Last Year: Never true    Ran Out of Food in the Last Year: Never true   Transportation Needs: Not on file   Physical Activity: Not on file   Stress: Not on file   Social Connections: Not on file   Intimate Partner Violence: Not on file   Housing Stability: Not on file     No family history on file. No Known Allergies  Current Outpatient Medications on File Prior to Visit   Medication Sig Dispense Refill    acetaminophen (TYLENOL) 500 MG tablet Take 1-2 tablets by mouth every 6 hours as needed for Pain 50 tablet 0    ibuprofen (IBU) 600 MG tablet Take 1 tablet by mouth every 6 hours as needed for Pain 120 tablet 0    oxyCODONE (ROXICODONE) 5 MG immediate release tablet Take 0.5-1 tablets by mouth every 6 hours as needed for Pain for up to 3 days. Intended supply: 3 days. Take lowest dose possible to manage pain 6 tablet 0    hydrOXYzine pamoate (VISTARIL) 50 MG capsule Take 1 capsule by mouth 3 times daily as needed for Anxiety 90 capsule 1    naltrexone-buPROPion (CONTRAVE) 8-90 MG per extended release tablet Take 2 tablets by mouth 2 times daily 120 tablet 1    lisinopril (PRINIVIL;ZESTRIL) 20 MG tablet Take 1 tablet by mouth daily 30 tablet 11    atorvastatin (LIPITOR) 10 MG tablet Take 1 tablet by mouth daily 30 tablet 5    tiZANidine (ZANAFLEX) 4 MG tablet Take 1 tablet by mouth every 8 hours as needed (back pain/spasm) 21 tablet 0    albuterol sulfate HFA (PROVENTIL HFA) 108 (90 Base) MCG/ACT inhaler Inhale 2 puffs into the lungs every 6 hours as needed for Wheezing 1 Inhaler 3    levonorgestrel (MIRENA) IUD 52 mg 1 each by Intrauterine route once for 1 dose 1 each 0     Current Facility-Administered Medications on File Prior to Visit   Medication Dose Route Frequency Provider Last Rate Last Admin    levonorgestrel (MIRENA) IUD 52 mg 1 each  1 each IntraUTERine Once Kell Turner, NATASHA - CNP   1 each at 07/13/20 0942         Review of Systems      Objective:   Pulse (!) 107   Temp 98 °F (36.7 °C) (Temporal)   Ht 5' 9\" (1.753 m)   Wt 180 lb (81.6 kg)   SpO2 98%   BMI 26.58 kg/m²     Ortho Exam  Left upper extremity: Splint was taken down and patient has a moderate amount of edema and swelling appreciated about the left elbow. Tender to palpation somewhat globally about the left elbow. Able to perform all cardinal hand movements distally and sensation intact light touch in the radial, ulnar and median nerve distribution. Strong palpable radial pulse and brisk cap refill in all digits. Tolerates some gentle flexion and extension of the elbow but doing so causes her moderate amount of pain. Pronation and supination of the elbow does not elicit any significant pain or discomfort. Radiographs and Laboratory Studies:     Diagnostic Imaging Studies:    AP and lateral imaging of the left elbow from emergency department from 9/25/2022 for injury films were independently reviewed    Imaging demonstrates a posterior lateral dislocation of the left elbow with no evidence of bony avulsion    Lateral imaging left elbow was from the emergency department status post reduction    Imaging demonstrates appearance of concentric reduction of ulnohumeral as well as radiocapitellar joint of the left elbow with no evidence of fracture. Laboratory Studies:   Lab Results   Component Value Date    WBC 5.6 03/09/2022    HGB 14.0 03/09/2022    HCT 42.2 03/09/2022    MCV 97.5 03/09/2022     03/09/2022     No results found for: SEDRATE  No results found for: CRP    Assessment:       Diagnosis Orders   1. Closed dislocation of left elbow, initial encounter  traMADol (ULTRAM) 50 MG tablet             Plan:     Patient sustained a simple dislocation of the left elbow in a posterior lateral direction. Anticipate rupture of the lateral ulnar collateral ligament. No evidence of bony avulsion or damage to the coronoid. As such this is inherently stable dislocation status post reduction. We will keep in sling for the next 2 weeks with repeat clinical examination at that juncture with repeat radiographs. We will plan for progression then of range of motion direct blood safely in a pronated manner for extension working with physical therapy.   For now no lifting pulling or pushing with the left upper extremity. Patient stated major orthopedic injury. Pain relief has been inadequate with anti-inflammatories, icing resting modalities. As such I do feel that an additional narcotic pain prescription is warranted in order to manage postoperative pain. I did check the PDMP on this patient. No concerning findings were noted. I did write a prescription within the 7-day limit as well as 30 mEq/day limit for acute pain. Patient was instructed to wean these as soon as possible and transition to nonnarcotic pain control regimen. Patient was informed of the risks associated with narcotic medication including potential for addiction. Orders Placed This Encounter   Medications    traMADol (ULTRAM) 50 MG tablet     Sig: Take 1 tablet by mouth every 6 hours as needed for Pain for up to 7 days. Intended supply: 7 days. Take lowest dose possible to manage pain     Dispense:  28 tablet     Refill:  0     Reduce doses taken as pain becomes manageable       No follow-ups on file.       Kathie Nobles MD

## 2022-09-29 ENCOUNTER — APPOINTMENT (OUTPATIENT)
Dept: ULTRASOUND IMAGING | Age: 47
End: 2022-09-29
Payer: MEDICAID

## 2022-09-29 ENCOUNTER — HOSPITAL ENCOUNTER (EMERGENCY)
Age: 47
Discharge: HOME | End: 2022-09-29
Attending: STUDENT IN AN ORGANIZED HEALTH CARE EDUCATION/TRAINING PROGRAM
Payer: MEDICAID

## 2022-09-29 ENCOUNTER — APPOINTMENT (OUTPATIENT)
Dept: GENERAL RADIOLOGY | Age: 47
End: 2022-09-29
Payer: MEDICAID

## 2022-09-29 ENCOUNTER — OFFICE VISIT (OUTPATIENT)
Dept: FAMILY MEDICINE CLINIC | Age: 47
End: 2022-09-29
Payer: MEDICAID

## 2022-09-29 VITALS
HEIGHT: 69 IN | DIASTOLIC BLOOD PRESSURE: 84 MMHG | BODY MASS INDEX: 27.7 KG/M2 | HEART RATE: 84 BPM | TEMPERATURE: 98.9 F | SYSTOLIC BLOOD PRESSURE: 128 MMHG | OXYGEN SATURATION: 97 % | WEIGHT: 187 LBS

## 2022-09-29 VITALS
BODY MASS INDEX: 27.7 KG/M2 | HEIGHT: 69 IN | RESPIRATION RATE: 16 BRPM | SYSTOLIC BLOOD PRESSURE: 131 MMHG | HEART RATE: 73 BPM | DIASTOLIC BLOOD PRESSURE: 85 MMHG | OXYGEN SATURATION: 99 % | WEIGHT: 187 LBS | TEMPERATURE: 98.3 F

## 2022-09-29 DIAGNOSIS — S53.105A DISLOCATED ELBOW, LEFT, INITIAL ENCOUNTER: Primary | ICD-10-CM

## 2022-09-29 DIAGNOSIS — M79.89 LEFT ARM SWELLING: Primary | ICD-10-CM

## 2022-09-29 LAB
ALBUMIN SERPL-MCNC: 4.8 G/DL (ref 3.5–4.6)
ALP BLD-CCNC: 47 U/L (ref 40–130)
ALT SERPL-CCNC: 27 U/L (ref 0–33)
ANION GAP SERPL CALCULATED.3IONS-SCNC: 14 MEQ/L (ref 9–15)
AST SERPL-CCNC: 41 U/L (ref 0–35)
BASOPHILS ABSOLUTE: 0 K/UL (ref 0–0.2)
BASOPHILS RELATIVE PERCENT: 0.6 %
BILIRUB SERPL-MCNC: 0.5 MG/DL (ref 0.2–0.7)
BUN BLDV-MCNC: 7 MG/DL (ref 6–20)
C-REACTIVE PROTEIN: 3.4 MG/L (ref 0–5)
CALCIUM SERPL-MCNC: 9.7 MG/DL (ref 8.5–9.9)
CHLORIDE BLD-SCNC: 99 MEQ/L (ref 95–107)
CO2: 27 MEQ/L (ref 20–31)
CREAT SERPL-MCNC: 0.59 MG/DL (ref 0.5–0.9)
EOSINOPHILS ABSOLUTE: 0.2 K/UL (ref 0–0.7)
EOSINOPHILS RELATIVE PERCENT: 3 %
GFR AFRICAN AMERICAN: >60
GFR NON-AFRICAN AMERICAN: >60
GLOBULIN: 2.5 G/DL (ref 2.3–3.5)
GLUCOSE BLD-MCNC: 85 MG/DL (ref 70–99)
HCG, URINE, POC: NEGATIVE
HCT VFR BLD CALC: 37.2 % (ref 37–47)
HEMOGLOBIN: 12.4 G/DL (ref 12–16)
LACTIC ACID: 0.6 MMOL/L (ref 0.5–2.2)
LYMPHOCYTES ABSOLUTE: 2.3 K/UL (ref 1–4.8)
LYMPHOCYTES RELATIVE PERCENT: 36.3 %
Lab: NORMAL
MCH RBC QN AUTO: 31.9 PG (ref 27–31.3)
MCHC RBC AUTO-ENTMCNC: 33.4 % (ref 33–37)
MCV RBC AUTO: 95.5 FL (ref 82–100)
MONOCYTES ABSOLUTE: 0.4 K/UL (ref 0.2–0.8)
MONOCYTES RELATIVE PERCENT: 6.6 %
NEGATIVE QC PASS/FAIL: NORMAL
NEUTROPHILS ABSOLUTE: 3.3 K/UL (ref 1.4–6.5)
NEUTROPHILS RELATIVE PERCENT: 53.5 %
PDW BLD-RTO: 13.2 % (ref 11.5–14.5)
PLATELET # BLD: 249 K/UL (ref 130–400)
POSITIVE QC PASS/FAIL: NORMAL
POTASSIUM SERPL-SCNC: 4.1 MEQ/L (ref 3.4–4.9)
RBC # BLD: 3.89 M/UL (ref 4.2–5.4)
SEDIMENTATION RATE, ERYTHROCYTE: 18 MM (ref 0–20)
SODIUM BLD-SCNC: 140 MEQ/L (ref 135–144)
TOTAL CK: 852 U/L (ref 0–170)
TOTAL PROTEIN: 7.3 G/DL (ref 6.3–8)
URIC ACID, SERUM: 2.2 MG/DL (ref 2.4–5.7)
WBC # BLD: 6.2 K/UL (ref 4.8–10.8)

## 2022-09-29 PROCEDURE — 80053 COMPREHEN METABOLIC PANEL: CPT

## 2022-09-29 PROCEDURE — G8419 CALC BMI OUT NRM PARAM NOF/U: HCPCS | Performed by: PHYSICIAN ASSISTANT

## 2022-09-29 PROCEDURE — 84550 ASSAY OF BLOOD/URIC ACID: CPT

## 2022-09-29 PROCEDURE — 99215 OFFICE O/P EST HI 40 MIN: CPT | Performed by: PHYSICIAN ASSISTANT

## 2022-09-29 PROCEDURE — 73090 X-RAY EXAM OF FOREARM: CPT

## 2022-09-29 PROCEDURE — G8427 DOCREV CUR MEDS BY ELIG CLIN: HCPCS | Performed by: PHYSICIAN ASSISTANT

## 2022-09-29 PROCEDURE — 96374 THER/PROPH/DIAG INJ IV PUSH: CPT

## 2022-09-29 PROCEDURE — 36415 COLL VENOUS BLD VENIPUNCTURE: CPT

## 2022-09-29 PROCEDURE — 93971 EXTREMITY STUDY: CPT

## 2022-09-29 PROCEDURE — 85652 RBC SED RATE AUTOMATED: CPT

## 2022-09-29 PROCEDURE — 83605 ASSAY OF LACTIC ACID: CPT

## 2022-09-29 PROCEDURE — 99284 EMERGENCY DEPT VISIT MOD MDM: CPT

## 2022-09-29 PROCEDURE — 6370000000 HC RX 637 (ALT 250 FOR IP): Performed by: STUDENT IN AN ORGANIZED HEALTH CARE EDUCATION/TRAINING PROGRAM

## 2022-09-29 PROCEDURE — 86140 C-REACTIVE PROTEIN: CPT

## 2022-09-29 PROCEDURE — 82550 ASSAY OF CK (CPK): CPT

## 2022-09-29 PROCEDURE — 4004F PT TOBACCO SCREEN RCVD TLK: CPT | Performed by: PHYSICIAN ASSISTANT

## 2022-09-29 PROCEDURE — 73080 X-RAY EXAM OF ELBOW: CPT

## 2022-09-29 PROCEDURE — 85025 COMPLETE CBC W/AUTO DIFF WBC: CPT

## 2022-09-29 PROCEDURE — 6360000002 HC RX W HCPCS: Performed by: STUDENT IN AN ORGANIZED HEALTH CARE EDUCATION/TRAINING PROGRAM

## 2022-09-29 PROCEDURE — 73060 X-RAY EXAM OF HUMERUS: CPT

## 2022-09-29 RX ORDER — ALBUTEROL SULFATE 90 UG/1
2 AEROSOL, METERED RESPIRATORY (INHALATION) EVERY 6 HOURS PRN
Qty: 1 EACH | Refills: 3 | Status: SHIPPED | OUTPATIENT
Start: 2022-09-29

## 2022-09-29 RX ORDER — OXYCODONE HYDROCHLORIDE AND ACETAMINOPHEN 5; 325 MG/1; MG/1
1 TABLET ORAL ONCE
Status: COMPLETED | OUTPATIENT
Start: 2022-09-29 | End: 2022-09-29

## 2022-09-29 RX ORDER — HYDROCODONE BITARTRATE AND ACETAMINOPHEN 5; 325 MG/1; MG/1
1 TABLET ORAL EVERY 6 HOURS PRN
Qty: 10 TABLET | Refills: 0 | Status: SHIPPED | OUTPATIENT
Start: 2022-09-29 | End: 2022-10-02

## 2022-09-29 RX ORDER — KETOROLAC TROMETHAMINE 30 MG/ML
30 INJECTION, SOLUTION INTRAMUSCULAR; INTRAVENOUS ONCE
Status: COMPLETED | OUTPATIENT
Start: 2022-09-29 | End: 2022-09-29

## 2022-09-29 RX ADMIN — OXYCODONE AND ACETAMINOPHEN 1 TABLET: 5; 325 TABLET ORAL at 17:25

## 2022-09-29 RX ADMIN — KETOROLAC TROMETHAMINE 30 MG: 30 INJECTION, SOLUTION INTRAMUSCULAR at 17:44

## 2022-09-29 ASSESSMENT — PAIN DESCRIPTION - LOCATION
LOCATION: ARM;ELBOW
LOCATION: ARM;ELBOW
LOCATION: ARM
LOCATION: ARM;ELBOW

## 2022-09-29 ASSESSMENT — ENCOUNTER SYMPTOMS
SHORTNESS OF BREATH: 0
NAUSEA: 0
BACK PAIN: 0
EYE PAIN: 0
SORE THROAT: 0
DIARRHEA: 0
CHEST TIGHTNESS: 0

## 2022-09-29 ASSESSMENT — PAIN SCALES - GENERAL
PAINLEVEL_OUTOF10: 7
PAINLEVEL_OUTOF10: 7
PAINLEVEL_OUTOF10: 3

## 2022-09-29 ASSESSMENT — PAIN DESCRIPTION - DESCRIPTORS: DESCRIPTORS: ACHING;THROBBING

## 2022-09-29 ASSESSMENT — PAIN DESCRIPTION - ORIENTATION
ORIENTATION: LEFT

## 2022-09-29 ASSESSMENT — PAIN DESCRIPTION - ONSET: ONSET: ON-GOING

## 2022-09-29 ASSESSMENT — PAIN DESCRIPTION - FREQUENCY: FREQUENCY: CONTINUOUS

## 2022-09-29 ASSESSMENT — PAIN - FUNCTIONAL ASSESSMENT: PAIN_FUNCTIONAL_ASSESSMENT: 0-10

## 2022-09-29 ASSESSMENT — PAIN DESCRIPTION - PAIN TYPE: TYPE: ACUTE PAIN

## 2022-09-29 NOTE — ED NOTES
Pt upset and doesn't want anything else done after IV attempt by [facility] RN. States \"I need to go. My dad is on [address] and I want to be with him. \" Lots of reassurance given. Explained why we need to run blood tests and give her some pain medications. Verbalized understanding and pt calmed down after. States she'll take the blood tests.       Queta Clemons RN  09/29/22 2615

## 2022-09-29 NOTE — Clinical Note
Tk Sinclair was seen and treated in our emergency department on 9/29/2022. She may return to work on 10/03/2022. If you have any questions or concerns, please don't hesitate to call.       Jany James PA-C

## 2022-09-29 NOTE — ED NOTES
Discharge instructions went over with pt - no questions. Pt left with son.       Aram Lovell, RN  09/29/22 1924

## 2022-09-29 NOTE — PROGRESS NOTES
Subjective  Lucamani Olayinka, 51 y.o. female presents today with:  Chief Complaint   Patient presents with    Follow-up     Patient presents today for ED f/u. HPI  Patient is here being seen acutely for follow-up to ER 9/25 after a fall resulting in dislocation of her left elbow  Patient states her elbow was mildly swollen after reduction however the swelling has progressed to her hands and upper arm with increased pain and limited mobility  Requesting refill of albuterol  Review of Systems   All other systems reviewed and are negative. Past Medical History:   Diagnosis Date    Depression      Past Surgical History:   Procedure Laterality Date    BREAST ENHANCEMENT SURGERY       Social History     Socioeconomic History    Marital status: Single     Spouse name: Not on file    Number of children: Not on file    Years of education: Not on file    Highest education level: Not on file   Occupational History    Not on file   Tobacco Use    Smoking status: Every Day     Packs/day: 1.00     Types: Cigarettes    Smokeless tobacco: Never   Vaping Use    Vaping Use: Never used   Substance and Sexual Activity    Alcohol use: Yes     Alcohol/week: 4.0 standard drinks     Types: 4 Glasses of wine per week    Drug use: No    Sexual activity: Not on file   Other Topics Concern    Not on file   Social History Narrative    Not on file     Social Determinants of Health     Financial Resource Strain: Low Risk     Difficulty of Paying Living Expenses: Not hard at all   Food Insecurity: No Food Insecurity    Worried About Running Out of Food in the Last Year: Never true    Ran Out of Food in the Last Year: Never true   Transportation Needs: Not on file   Physical Activity: Not on file   Stress: Not on file   Social Connections: Not on file   Intimate Partner Violence: Not on file   Housing Stability: Not on file     No family history on file.    No Known Allergies  Current Outpatient Medications   Medication Sig Dispense Refill acetaminophen (TYLENOL) 500 MG tablet Take 1-2 tablets by mouth every 6 hours as needed for Pain 50 tablet 0    ibuprofen (IBU) 600 MG tablet Take 1 tablet by mouth every 6 hours as needed for Pain 120 tablet 0    hydrOXYzine pamoate (VISTARIL) 50 MG capsule Take 1 capsule by mouth 3 times daily as needed for Anxiety 90 capsule 1    lisinopril (PRINIVIL;ZESTRIL) 20 MG tablet Take 1 tablet by mouth daily 30 tablet 11    atorvastatin (LIPITOR) 10 MG tablet Take 1 tablet by mouth daily 30 tablet 5    albuterol sulfate HFA (PROVENTIL HFA) 108 (90 Base) MCG/ACT inhaler Inhale 2 puffs into the lungs every 6 hours as needed for Wheezing 1 Inhaler 3    traMADol (ULTRAM) 50 MG tablet Take 1 tablet by mouth every 6 hours as needed for Pain for up to 7 days. Intended supply: 7 days. Take lowest dose possible to manage pain (Patient not taking: Reported on 9/29/2022) 28 tablet 0    naltrexone-buPROPion (CONTRAVE) 8-90 MG per extended release tablet Take 2 tablets by mouth 2 times daily (Patient not taking: Reported on 9/29/2022) 120 tablet 1    tiZANidine (ZANAFLEX) 4 MG tablet Take 1 tablet by mouth every 8 hours as needed (back pain/spasm) (Patient not taking: Reported on 9/29/2022) 21 tablet 0    levonorgestrel (MIRENA) IUD 52 mg 1 each by Intrauterine route once for 1 dose 1 each 0     Current Facility-Administered Medications   Medication Dose Route Frequency Provider Last Rate Last Admin    levonorgestrel (MIRENA) IUD 52 mg 1 each  1 each IntraUTERine Once NATASHA Olivia CNP   1 each at 07/13/20 0942       Objective    Vitals:    09/29/22 1329   BP: 128/84   Site: Right Upper Arm   Position: Sitting   Cuff Size: Medium Adult   Pulse: 84   Temp: 98.9 °F (37.2 °C)   TempSrc: Temporal   SpO2: 97%   Weight: 187 lb (84.8 kg)   Height: 5' 9\" (1.753 m)     Physical Exam  Constitutional:       Appearance: Normal appearance. HENT:      Head: Normocephalic and atraumatic.    Eyes:      Extraocular Movements: Extraocular movements intact. Conjunctiva/sclera: Conjunctivae normal.      Pupils: Pupils are equal, round, and reactive to light. Pulmonary:      Effort: Pulmonary effort is normal.   Musculoskeletal:         General: Swelling, tenderness and deformity present. Right lower leg: No edema. Comments: Increased warmth  and swelling of left hand to mid humerus  Decreased pain sensation to digits of the left hand     Neurological:      Mental Status: She is alert and oriented to person, place, and time. Psychiatric:         Mood and Affect: Mood is anxious. Behavior: Behavior is cooperative. Judgment: Judgment normal.            Assessment & Plan    Diagnosis Orders   1. Dislocated elbow, left, initial encounter              No orders of the defined types were placed in this encounter. No orders of the defined types were placed in this encounter. There are no discontinued medications. Return if symptoms worsen or fail to improve.     Carolina Merchant PA-C

## 2022-09-29 NOTE — ED TRIAGE NOTES
Pt had dislocation of the L elbow that was reduced but pt states feels a \"pop\" and has edema to hand and arm up to elbow and bicep  Pt has numbness and tingling to the left hand  Pt states that the pain is \"intense\"

## 2022-09-29 NOTE — ED NOTES
Amb to bathroom and back with 1 assist. States her pain is so much better, rating pain 3-4/10 at this time.       Marisel Chávez RN  09/29/22 2048

## 2022-09-29 NOTE — ED PROVIDER NOTES
[facility] ED  eMERGENCYdEPARTMENT eNCOUnter      Pt Name: Lisa Clark  MRN: [id number]  Candi 1975  Date of evaluation: 9/29/2022  Milton Hernandez PA-C    CHIEF COMPLAINT           HPI  Lisa Clark is a 51 y.o. female per chart review has a h/o bipolar disorder, JAIME, smoking presents with L arm pain. Pt reports gradual onset, severe, sharp, radiating pain in the L arm starting at the elbow worsening for 4 days. Pt was seen in the ED and treated for elbow dislocation with proper optho f/u. She states her swelling and pain have been out of control since. Denies numbness, tingling, fever chills. ROS  Review of Systems   Constitutional:  Negative for chills, fatigue and fever. HENT:  Negative for ear pain, hearing loss and sore throat. Eyes:  Negative for pain and visual disturbance. Respiratory:  Negative for chest tightness and shortness of breath. Cardiovascular:  Negative for chest pain. Gastrointestinal:  Negative for diarrhea and nausea. Endocrine: Negative for cold intolerance. Genitourinary:  Negative for hematuria. Musculoskeletal:  Negative for back pain. +L arm swelling   Skin:  Negative for rash and wound. Neurological:  Negative for dizziness and headaches. Psychiatric/Behavioral:  Negative for behavioral problems and confusion. Except as noted above the remainder of the review of systems was reviewed and negative.        PAST MEDICAL HISTORY     Past Medical History:   Diagnosis Date    Depression          SURGICAL HISTORY       Past Surgical History:   Procedure Laterality Date    BREAST ENHANCEMENT SURGERY           CURRENTMEDICATIONS       Previous Medications    ACETAMINOPHEN (TYLENOL) 500 MG TABLET    Take 1-2 tablets by mouth every 6 hours as needed for Pain    ALBUTEROL SULFATE HFA (PROVENTIL HFA) 108 (90 BASE) MCG/ACT INHALER    Inhale 2 puffs into the lungs every 6 hours as needed for Wheezing    ATORVASTATIN (LIPITOR) 10 MG TABLET    Take 1 tablet by mouth daily    HYDROXYZINE PAMOATE (VISTARIL) 50 MG CAPSULE    Take 1 capsule by mouth 3 times daily as needed for Anxiety    IBUPROFEN (IBU) 600 MG TABLET    Take 1 tablet by mouth every 6 hours as needed for Pain    LEVONORGESTREL (MIRENA) IUD 52 MG    1 each by Intrauterine route once for 1 dose    LISINOPRIL (PRINIVIL;ZESTRIL) 20 MG TABLET    Take 1 tablet by mouth daily    NALTREXONE-BUPROPION (CONTRAVE) 8-90 MG PER EXTENDED RELEASE TABLET    Take 2 tablets by mouth 2 times daily    TIZANIDINE (ZANAFLEX) 4 MG TABLET    Take 1 tablet by mouth every 8 hours as needed (back pain/spasm)    TRAMADOL (ULTRAM) 50 MG TABLET    Take 1 tablet by mouth every 6 hours as needed for Pain for up to 7 days. Intended supply: 7 days. Take lowest dose possible to manage pain       ALLERGIES     Patient has no known allergies. FAMILY HISTORY     No family history on file. SOCIAL HISTORY       Social History     Socioeconomic History    Marital status: Single   Tobacco Use    Smoking status: Every Day     Packs/day: 1.00     Types: Cigarettes    Smokeless tobacco: Never   Vaping Use    Vaping Use: Never used   Substance and Sexual Activity    Alcohol use: Yes     Alcohol/week: 4.0 standard drinks     Types: 4 Glasses of wine per week    Drug use: No     Social Determinants of Health     Financial Resource Strain: Low Risk     Difficulty of Paying Living Expenses: Not hard at all   Food Insecurity: No Food Insecurity    Worried About [address] in the Last Year: Never true    Ran Out of Food in the Last Year: Never true         PHYSICAL EXAM       ED Triage Vitals   BP Temp Temp Source Heart Rate Resp SpO2 Height Weight   09/29/22 1510 09/29/22 1511 09/29/22 1511 09/29/22 1510 09/29/22 1510 09/29/22 1510 09/29/22 1510 09/29/22 1510   (!) 152/92 98.3 °F (36.8 °C) Oral 86 18 98 % 5' 9\" (1.753 m) 187 lb (84.8 kg)       Physical Exam  Constitutional:       Appearance: Normal appearance. HENT:      Head: Normocephalic and atraumatic. Right Ear: External ear normal.      Left Ear: External ear normal.      Nose: Nose normal.      Mouth/Throat:      Mouth: Mucous membranes are moist.   Eyes:      Extraocular Movements: Extraocular movements intact. Conjunctiva/sclera: Conjunctivae normal.   Cardiovascular:      Rate and Rhythm: Normal rate and regular rhythm. Heart sounds: Normal heart sounds. Pulmonary:      Effort: Pulmonary effort is normal.      Breath sounds: Normal breath sounds. No stridor. No wheezing or rhonchi. Abdominal:      Palpations: Abdomen is soft. Tenderness: There is no abdominal tenderness. Musculoskeletal:         General: Normal range of motion. Arms:       Cervical back: Normal range of motion and neck supple. No tenderness. Skin:     General: Skin is warm and dry. Neurological:      General: No focal deficit present. Mental Status: She is alert and oriented to person, place, and time. Psychiatric:         Mood and Affect: Mood normal.         Behavior: Behavior normal.         MDM  50 y/o female presenting with arm pain. Afebrile and stable. Pt given po percocet. Consulted attending [facility] who recommends US for dvt r/o, Xrays and blood work. Blood work negative for infection, muscle breakdown. Lab called stated CMP was lost. Ck is elevated consistent with traumatic injury requiring reduction. Xrays negative for fracture, US negative for DVT. Pt agreeable to d/c with conservative care and ortho f/u. She will return if sx change or worsen. FINAL IMPRESSION      1.  Left arm swelling          DISPOSITION/PLAN   DISPOSITION Discharge - Pending Orders Complete 09/29/2022 06:55:41 PM        DISCHARGE MEDICATIONS:  [unfilled]         Michelle Arredondo PA-C(electronically signed)  Attending Emergency Physician           Michelle Arredondo PA-C  09/29/22 4119

## 2022-10-04 RX ORDER — ATORVASTATIN CALCIUM 10 MG/1
TABLET, FILM COATED ORAL
Qty: 30 TABLET | Refills: 5 | Status: SHIPPED | OUTPATIENT
Start: 2022-10-04

## 2022-10-07 RX ORDER — ATORVASTATIN CALCIUM 10 MG/1
TABLET, FILM COATED ORAL
Qty: 30 TABLET | Refills: 5 | OUTPATIENT
Start: 2022-10-07

## 2022-10-10 ENCOUNTER — OFFICE VISIT (OUTPATIENT)
Dept: ORTHOPEDIC SURGERY | Age: 47
End: 2022-10-10
Payer: MEDICAID

## 2022-10-10 ENCOUNTER — HOSPITAL ENCOUNTER (OUTPATIENT)
Dept: ORTHOPEDIC SURGERY | Age: 47
Discharge: HOME | End: 2022-10-12
Payer: MEDICAID

## 2022-10-10 VITALS
OXYGEN SATURATION: 99 % | BODY MASS INDEX: 27.7 KG/M2 | WEIGHT: 187 LBS | TEMPERATURE: 97.1 F | HEIGHT: 69 IN | HEART RATE: 96 BPM

## 2022-10-10 DIAGNOSIS — S53.105D DISLOCATION OF LEFT ELBOW, SUBSEQUENT ENCOUNTER: Primary | ICD-10-CM

## 2022-10-10 DIAGNOSIS — S53.105D DISLOCATION OF LEFT ELBOW, SUBSEQUENT ENCOUNTER: ICD-10-CM

## 2022-10-10 PROCEDURE — G8419 CALC BMI OUT NRM PARAM NOF/U: HCPCS | Performed by: ORTHOPAEDIC SURGERY

## 2022-10-10 PROCEDURE — G8484 FLU IMMUNIZE NO ADMIN: HCPCS | Performed by: ORTHOPAEDIC SURGERY

## 2022-10-10 PROCEDURE — 4004F PT TOBACCO SCREEN RCVD TLK: CPT | Performed by: ORTHOPAEDIC SURGERY

## 2022-10-10 PROCEDURE — 99214 OFFICE O/P EST MOD 30 MIN: CPT | Performed by: ORTHOPAEDIC SURGERY

## 2022-10-10 PROCEDURE — 73070 X-RAY EXAM OF ELBOW: CPT

## 2022-10-10 PROCEDURE — 73070 X-RAY EXAM OF ELBOW: CPT | Performed by: ORTHOPAEDIC SURGERY

## 2022-10-10 PROCEDURE — G8427 DOCREV CUR MEDS BY ELIG CLIN: HCPCS | Performed by: ORTHOPAEDIC SURGERY

## 2022-10-10 NOTE — PROGRESS NOTES
Subjective:      Patient ID: Rosie Giles is a 51 y.o. female who presents today for:  Chief Complaint   Patient presents with    Follow-up     Follow up on Closed dislocation of left elbow       HPI  Patient did present to the emergency department since last visit due to increased swelling in her left elbow. The swelling has subsequently improved. Patient has been appropriately immobilizing her elbow in the sling with limited use at this time. Denies any numbness or tingling of her affected extremity. Past Medical History:   Diagnosis Date    Depression       Past Surgical History:   Procedure Laterality Date    BREAST ENHANCEMENT SURGERY       Social History     Socioeconomic History    Marital status: Single     Spouse name: Not on file    Number of children: Not on file    Years of education: Not on file    Highest education level: Not on file   Occupational History    Not on file   Tobacco Use    Smoking status: Every Day     Packs/day: 1.00     Types: Cigarettes    Smokeless tobacco: Never   Vaping Use    Vaping Use: Never used   Substance and Sexual Activity    Alcohol use: Yes     Alcohol/week: 4.0 standard drinks     Types: 4 Glasses of wine per week    Drug use: No    Sexual activity: Not on file   Other Topics Concern    Not on file   Social History Narrative    Not on file     Social Determinants of Health     Financial Resource Strain: Low Risk     Difficulty of Paying Living Expenses: Not hard at all   Food Insecurity: No Food Insecurity    Worried About Running Out of Food in the Last Year: Never true    Ran Out of Food in the Last Year: Never true   Transportation Needs: Not on file   Physical Activity: Not on file   Stress: Not on file   Social Connections: Not on file   Intimate Partner Violence: Not on file   Housing Stability: Not on file     No family history on file.   No Known Allergies  Current Outpatient Medications on File Prior to Visit   Medication Sig Dispense Refill    atorvastatin (LIPITOR) 10 MG tablet take 1 tablet by mouth once daily 30 tablet 5    albuterol sulfate HFA (PROVENTIL HFA) 108 (90 Base) MCG/ACT inhaler Inhale 2 puffs into the lungs every 6 hours as needed for Wheezing 1 each 3    acetaminophen (TYLENOL) 500 MG tablet Take 1-2 tablets by mouth every 6 hours as needed for Pain 50 tablet 0    ibuprofen (IBU) 600 MG tablet Take 1 tablet by mouth every 6 hours as needed for Pain 120 tablet 0    hydrOXYzine pamoate (VISTARIL) 50 MG capsule Take 1 capsule by mouth 3 times daily as needed for Anxiety 90 capsule 1    naltrexone-buPROPion (CONTRAVE) 8-90 MG per extended release tablet Take 2 tablets by mouth 2 times daily (Patient not taking: Reported on 9/29/2022) 120 tablet 1    lisinopril (PRINIVIL;ZESTRIL) 20 MG tablet Take 1 tablet by mouth daily 30 tablet 11    tiZANidine (ZANAFLEX) 4 MG tablet Take 1 tablet by mouth every 8 hours as needed (back pain/spasm) (Patient not taking: Reported on 9/29/2022) 21 tablet 0    levonorgestrel (MIRENA) IUD 52 mg 1 each by Intrauterine route once for 1 dose 1 each 0     Current Facility-Administered Medications on File Prior to Visit   Medication Dose Route Frequency Provider Last Rate Last Admin    levonorgestrel (MIRENA) IUD 52 mg 1 each  1 each IntraUTERine Once Kell Turner, NATASHA - CNP   1 each at 07/13/20 8751         Review of Systems      Objective:   Pulse 96   Temp 97.1 °F (36.2 °C) (Temporal)   Ht 5' 9\" (1.753 m)   Wt 187 lb (84.8 kg)   SpO2 99%   BMI 27.62 kg/m²     Ortho Exam  Left upper extremity:  Skin: Intact circumferentially, with mild swelling appreciated to the posterior aspect and lateral aspect of the left elbow  Neuro: Sensation intact light touch in the radial, ulnar and median nerve distribution. Able to perform all cardinal hand movements. Vascular: Strong palpable radial pulse, brisk cap refill in all digits. MSK: Patient tender palpation about the posterior lateral aspect of the elbow.   Tolerates some ELBOW LEFT (2 VIEWS)     Standing Status:   Future     Number of Occurrences:   1     Standing Expiration Date:   10/10/2023     Order Specific Question:   Reason for exam:     Answer:   follow up    Ambulatory referral to Physical Therapy     Referral Priority:   Routine     Referral Type:   Eval and Treat     Referral Reason:   Specialty Services Required     Requested Specialty:   Physical Therapist     Number of Visits Requested:   1       Alcon John MD

## 2022-10-13 ENCOUNTER — HOSPITAL ENCOUNTER (OUTPATIENT)
Dept: PHYSICAL THERAPY | Age: 47
Setting detail: THERAPIES SERIES
Discharge: HOME | End: 2022-10-13
Payer: MEDICAID

## 2022-10-13 PROCEDURE — 97162 PT EVAL MOD COMPLEX 30 MIN: CPT

## 2022-10-13 PROCEDURE — 97110 THERAPEUTIC EXERCISES: CPT

## 2022-10-13 ASSESSMENT — PAIN DESCRIPTION - ORIENTATION: ORIENTATION: LEFT

## 2022-10-13 ASSESSMENT — PAIN DESCRIPTION - DESCRIPTORS: DESCRIPTORS: ACHING

## 2022-10-13 ASSESSMENT — PAIN DESCRIPTION - DIRECTION: RADIATING_TOWARDS: DENIES

## 2022-10-13 ASSESSMENT — PAIN DESCRIPTION - PAIN TYPE: TYPE: ACUTE PAIN

## 2022-10-13 ASSESSMENT — PAIN DESCRIPTION - FREQUENCY: FREQUENCY: CONTINUOUS

## 2022-10-13 ASSESSMENT — PAIN SCALES - GENERAL: PAINLEVEL_OUTOF10: 2

## 2022-10-13 ASSESSMENT — PAIN DESCRIPTION - LOCATION: LOCATION: ELBOW

## 2022-10-13 NOTE — PROGRESS NOTES
[facility]  PHYSICAL THERAPY EVALUATION    Physical Therapy: Initial Evaluation    Patient: Thurman Cockayne (79 y.o.     female)   Examination Date: 10/13/2022   :  1975 ;    Confirmed: Yes MRN: [id number]  CSN: [id number]   Insurance: Payor: [employer] / Plan: [place] [employer] / Product Type: *No Product type* /   Insurance ID: [id number] - (Medicaid Managed) Secondary Insurance (if applicable):    Referring Physician: Joe Wang MD  none    Visits to Date/Visits Approved:  (per ronel yr- per discipline)    No Show/Cancelled Appts: 0      Medical Diagnosis: Dislocation of left elbow, subsequent encounter [U11.631T] dislocation of L elbow  Diagnosis: dislocation of L elbow   Treatment Diagnosis: decreased L elbow ROM, decreased L UE strength, decreased L hand sensation, decreased posture, and decreased activity tolerance and increased pain L elbow with normalized motion during ADLs     PERTINENT MEDICAL HISTORY           Medical History:     Past Medical History:   Diagnosis Date    Depression      Surgical History:   Past Surgical History:   Procedure Laterality Date    BREAST ENHANCEMENT SURGERY         Medications:   Current Outpatient Medications:     atorvastatin (LIPITOR) 10 MG tablet, take 1 tablet by mouth once daily, Disp: 30 tablet, Rfl: 5    albuterol sulfate HFA (PROVENTIL HFA) 108 (90 Base) MCG/ACT inhaler, Inhale 2 puffs into the lungs every 6 hours as needed for Wheezing, Disp: 1 each, Rfl: 3    acetaminophen (TYLENOL) 500 MG tablet, Take 1-2 tablets by mouth every 6 hours as needed for Pain, Disp: 50 tablet, Rfl: 0    ibuprofen (IBU) 600 MG tablet, Take 1 tablet by mouth every 6 hours as needed for Pain, Disp: 120 tablet, Rfl: 0    hydrOXYzine pamoate (VISTARIL) 50 MG capsule, Take 1 capsule by mouth 3 times daily as needed for Anxiety, Disp: 90 capsule, Rfl: 1    naltrexone-buPROPion (CONTRAVE) 8- MG per extended release tablet, Take 2 tablets by mouth 2 times daily (Patient not taking: Reported on 9/29/2022), Disp: 120 tablet, Rfl: 1    lisinopril (PRINIVIL;ZESTRIL) 20 MG tablet, Take 1 tablet by mouth daily, Disp: 30 tablet, Rfl: 11    tiZANidine (ZANAFLEX) 4 MG tablet, Take 1 tablet by mouth every 8 hours as needed (back pain/spasm) (Patient not taking: Reported on 9/29/2022), Disp: 21 tablet, Rfl: 0    levonorgestrel (MIRENA) IUD 52 mg, 1 each by Intrauterine route once for 1 dose, Disp: 1 each, Rfl: 0    Current Facility-Administered Medications:     levonorgestrel (MIRENA) IUD 52 mg 1 each, 1 each, IntraUTERine, Once, Kell Turner, APRN - CNP, 1 each at 07/13/20 6401  Allergies: Patient has no known allergies. SUBJECTIVE EXAMINATION     History obtained from[de-identified] Patient,      Family/Caregiver Present: No    Subjective History: Onset Date:  (9/25/2022)  Subjective: Pt to PT due to L elbow dislocation after a fall. Pt to session in L sling for comfort. No surgery was needed.   Additional Pertinent Hx (if applicable): acid reflux, high blood pressure, depression, anxiety, no elbow surgery   Comment: RTD 11/21     Pain Screening    Pain Screening  Patient Currently in Pain: Yes  Pain Assessment: 0-10  Pain Level: 2  Best Pain Level: 2  Pain Type: Acute pain  Pain Location: Elbow  Pain Orientation: Left  Pain Radiating Towards: denies  Pain Descriptors: Aching  Pain Frequency: Continuous  Pain Management/Relieving Factors: Ice    Functional Status    Social History:    Social History  Lives With:  (father- pt is caregiver for her father)  Type of Home: House    Occupation/Interests:   Occupation: Caregiver    Prior Level of Function:   pt is caregiver for her father include lifting assistance, assists dressed and bathing and her father is currently in nursing facility due to pt is currently unable to care for him Independent     Current Level of Function:   ADL Assistance: Independent  Homemaking Assistance: Independent  Homemaking Responsibilities: Yes  Ambulation Assistance: Independent  Transfer Assistance: Independent  Active : Yes    Dominant Hand: : Right    OBJECTIVE EXAMINATION     Restrictions:         Position Activity Restriction  Other position/activity restrictions: per pt- MD stated pt can take off sling to pt comfort- pt states she is leaving the sling on to remind self to avoid certain movements; Continue to recommend no lifting pulling or pushing with the left upper extremity until the 6-week kierra from injury. Injury was on 9/25. On manipulation of the elbow past 90 degrees the hand should be placed in a pronated position to prevent redislocation. May advance 10 degrees of extension each week. When flexing the elbow should do so in supination to allow for improvement in pronation and supination as well.     Regional Screen:    Cervical Screen: WFL  Wrist/Hand Screen: WFL    Observations:   General Observations  Posture:  (slouched)    Palpation:   Left Elbow Palpation: increased pain with light touch medial and lateral    Neuro Screen: Sensation  Overall Sensation Status: Impaired (R hand numbness - constant)    Left AROM  Right AROM         AROM LUE (degrees)  L Shoulder Flexion 0-180: 130  L Elbow Flexion 0-145: 90  L Elbow Extension 145-0: 125 with hand in pronation per MD protocol  L Forearm Pron 0-90: 70  L Forearm Supination  0-90: neutral     WNL     Left Strength  Right Strength      General Strength Testing UE: Right WNL (L UE NT due to no push, pull, lifting restriction)    General Strength Testing UE: Right WNL (L UE NT due to no push, pull, lifting restriction)        Outcomes Score:  Exam: UEFS 12/80    Treatment:  Exercises:   Exercises  Exercise 1: supine elbow ext in pronation 10 sec x 5  Exercise 2: scapular retraction*  Exercise 3: shoulder roll L*  Exercise 4: shoulder P/AROM*  Exercise 5: wrist AROM*  Exercise 6: elbow ext with pronation only with self overpressure*  Exercise 7: elbow supination in flexion*  Exercise 20: HEP: supine elbow ext stretch in pronation     Modalities:  Modalities:  (ice*)  *Indicates exercise,modality, or manual techniques to be initiated when appropriate       ASSESSMENT     Impression: Assessment: Pt is 51 y.o. female with L elbow dislocation after a fall on 9/25/2022. Pt to PT with precautions from MD and MD stating optional to wear sling at this time. Pt exhibits impairments including decreased L elbow ROM, decreased L UE strength, decreased L hand sensation, decreased posture, and decreased activity tolerance and increased pain L elbow with normalized motion during ADLs. Pt requires continued PT to address these impairments, progress strength and ROM, and progress HEP to self management symptoms within MD precautions for return to full use of L UE. Body Structures, Functions, Activity Limitations Requiring Skilled Therapeutic Intervention: Decreased functional mobility , Decreased ROM, Decreased strength, Decreased sensation, Decreased endurance, Increased pain, Decreased posture    Statement of Medical Necessity: Physical Therapy is both indicated and medically necessary as outlined in the POC to increase the likelihood of meeting the functionally related goals stated below. Patient's Activity Tolerance: Patient limited by pain      Patient's rehabilitation potential/prognosis is considered to be: Good    Factors which may impact rehabilitation potential include: None  Measures taken to address barrier(s): N/A  Patient Education: Goals, PT Role, Plan of Care, Evaluative findings, Insurance, Home Exercise Program      GOALS   Patient Goal(s): Patient Goals : \"full ROM and full use of arm\"    Short Term Goals Completed by 5 wks Goal Status   Pt will demonstrate improved L shoulder and elbow AROM (within MD protocol) for return to full functional use of L UE.  New   Pt will demonstrate improved L UE strength (assessed after 6 weeks post injury) >/= 3+/5 for return to assisting pt's father with dressing and bathing. New     Long Term Goals Completed by 5 wks Goal Status   LTG 1 Pt will demonstrate indep and 100% compliance with HEP for self managent of symptoms. New   LTG 2 Pt will demonstrate improved score on UEFS >/= 30/80 for improved quality of life. New   LTG 3 Pt will demonstrate decreased L elbow pain with P/AROM </= 2/10 for return to full functional use of L UE. New        TREATMENT PLAN       Requires PT Follow-Up: Yes    Treatment may include any combination of the following: Strengthening, ROM, Functional mobility training, Endurance training, Manual Therapy - Soft Tissue Mobilization, Pain management, Home exercise program, Safety education & training, Patient/Caregiver education & training, Equipment evaluation, education, & procurement, Modalities, Positioning, Therapeutic activities     Frequency / Duration:  Patient to be seen 1-2 xs/wk times per week for 10 weeks  Plan Comment:    transfer POC to [facility], PT          Eval Complexity:   Decision Making: Medium Complexity  History: Personal Factors and/or Comorbidities Impacting POC: High  History: acid reflux, high blood pressure, depression, anxiety, no elbow surgery  Examination of body system(s) including body structures and functions, activity limitations, and/or participation restrictions: High  Exam: UEFS 12/80  Clinical Presentation: Medium  Clinical Presentation: evolving    POST-PAIN     Pain Rating (0-10 pain scale):   5/10  Location and pain description same as pre-treatment unless indicated. Action: [] NA  [] Call Physician  [x] Perform HEP  [] Meds as prescribed    Evaluation and patient rights have been reviewed and patient agrees with plan of care.   Yes  [x]  No  []   Explain:     Ursula Fall Risk Assessment  Risk Factor Scale  Score   History of Falls [] Yes  [x] No 25  0 0   Secondary Diagnosis [] Yes  [x] No 15  0 0   Ambulatory Aid [] Furniture  [] Crutches/cane/walker  [x] None/bedrest/wheelchair/nurse 30  15  0 0   IV/Heparin Lock [] Yes  [x] No 20  0 0   Gait/Transferring [] Impaired  [] Weak  [x] Normal/bedrest/immobile 20  10  0 0   Mental Status [] Forgets limitations  [x] Oriented to own ability 15  0 0      Total:0     Based on the Assessment score: check the appropriate box.   [x]  No intervention needed   Low =   Score of 0-24  []  Use standard prevention interventions Moderate =  Score of 24-44   [] Discuss fall prevention strategies   [] Indicate moderate falls risk on eval  []  Use high risk prevention interventions High = Score of 45 and higher   [] Discuss fall prevention strategies   [] Provide supervision during treatment time      Minutes:  PT Individual Minutes  Time In: 1602  Time Out: 1700  Minutes: 58  Timed Code Treatment Minutes: 11 Minutes  Procedure Minutes: eval X 47 min     Timed Activity Minutes Units   Ther Ex 11 1     Electronically signed by Carlos Manuel Dominguez PT on 10/13/22 at 5:04 PM EDT

## 2022-10-13 NOTE — PROGRESS NOTES
[address]     Ph: [phone]  Fax: [phone]      [] Certification  [] Recertification [x]  Plan of Care  [] Progress Note [] Discharge      Referring Provider: Estel Siemens, MD Referring Provider (secondary): none   From:  Linh Chase, FABIAN  Patient: Kelsey Shen (38 y.o. female) : 1975 Date: 10/13/2022  Medical Diagnosis: Dislocation of left elbow, subsequent encounter [G97.817Y] dislocation of L elbow Diagnosis: dislocation of L elbow   Treatment Diagnosis: decreased L elbow ROM, decreased L UE strength, decreased L hand sensation, decreased posture, and decreased activity tolerance and increased pain L elbow with normalized motion during ADLs    Plan of Care/Certification Expiration Date: :     Progress Report Period from:  10/13/2022  to 10/13/2022    Visits to Date: 1 No Show: 0 Cancelled Appts: 0    OBJECTIVE:   Short Term Goals - Time Frame for Short Term Goals: 5 wks    Goals Current/Discharge status  Status   Short Term Goal 1: Pt will demonstrate improved L shoulder and elbow AROM (within MD protocol) for return to full functional use of L UE.     AROM LUE (degrees)  L Shoulder Flexion 0-180: 130  L Elbow Flexion 0-145: 90  L Elbow Extension 145-0: 125 with hand in pronation per MD protocol  L Forearm Pron 0-90: 70  L Forearm Supination  0-90: neutral    New   Short Term Goal 2: Pt will demonstrate improved L UE strength (assessed after 6 weeks post injury) >/= 3+/5 for return to assisting pt's father with dressing and bathing. General Strength Testing UE: Right WNL (L UE NT due to no push, pull, lifting restriction)   New     Long Term Goals - Time Frame for Long Term Goals : 5 wks  Goals Current/ Discharge status Status   Long Term Goal 1: Pt will demonstrate indep and 100% compliance with HEP for self managent of symptoms.  HEP initiated New   Long Term Goal 2: Pt will demonstrate improved score on UEFS >/= 30/80 for improved quality of life. UEFS 12/80   New   Long Term Goal 3: Pt will demonstrate decreased L elbow pain with P/AROM </= 2/10 for return to full functional use of L UE. Pain Screening  Patient Currently in Pain: Yes  Pain Assessment: 0-10  Pain Level: 2  Best Pain Level: 2  Pain Type: Acute pain  Pain Location: Elbow  Pain Orientation: Left  Pain Radiating Towards: denies  Pain Descriptors: Aching  Pain Frequency: Continuous  Pain Management/Relieving Factors: Ice   New     Body Structures, Functions, Activity Limitations Requiring Skilled Therapeutic Intervention: Decreased functional mobility , Decreased ROM, Decreased strength, Decreased sensation, Decreased endurance, Increased pain, Decreased posture  Assessment: Pt is 51 y.o. female with L elbow dislocation after a fall on 9/25/2022. Pt to PT with precautions from MD and MD stating optional to wear sling at this time. Pt exhibits impairments including decreased L elbow ROM, decreased L UE strength, decreased L hand sensation, decreased posture, and decreased activity tolerance and increased pain L elbow with normalized motion during ADLs. Pt requires continued PT to address these impairments, progress strength and ROM, and progress HEP to self management symptoms within MD precautions for return to full use of L UE. Therapy Prognosis: Good      PT Education: Goals;PT Role;Plan of Care;Evaluative findings; Insurance;Home Exercise Program    PLAN: [x] Evaluate and Treat  Frequency/Duration:  Plan Frequency: 1-2 xs/wk  Plan weeks: 10  Current Treatment Recommendations: Strengthening, ROM, Functional mobility training, Endurance training, Manual Therapy - Soft Tissue Mobilization, Pain management, Home exercise program, Safety education & training, Patient/Caregiver education & training, Equipment evaluation, education, & procurement, Modalities, Positioning, Therapeutic activities  Additional Comments: transfer POC to [facility], PT     Precautions: Other position/activity restrictions: per pt- MD stated pt can take off sling to pt comfort- pt states she is leaving the sling on to remind self to avoid certain movements; Continue to recommend no lifting pulling or pushing with the left upper extremity until the 6-week kierra from injury. Injury was on 9/25. On manipulation of the elbow past 90 degrees the hand should be placed in a pronated position to prevent redislocation. May advance 10 degrees of extension each week. When flexing the elbow should do so in supination to allow for improvement in pronation and supination as well. Patient Status:[x] Continue/ Initiate plan of Care    [] Discharge PT. Recommend pt continue with HEP. [] Additional visits requested, Please re-certify for additional visits:    [] Hold         Signature: Electronically signed by Liat Murphy PT on 10/13/22 at 5:07 PM EDT      If you have any questions or concerns, please don't hesitate to call. Thank you for your referral.    I have reviewed this plan of care and certify a need for medically necessary rehabilitation services.     Physician Signature:__________________________________________________________  Date:  Please sign and return

## 2022-10-17 ENCOUNTER — HOSPITAL ENCOUNTER (OUTPATIENT)
Dept: PHYSICAL THERAPY | Age: 47
Setting detail: THERAPIES SERIES
Discharge: HOME | End: 2022-10-17
Payer: MEDICAID

## 2022-10-17 PROCEDURE — 97110 THERAPEUTIC EXERCISES: CPT

## 2022-10-17 ASSESSMENT — PAIN DESCRIPTION - PAIN TYPE: TYPE: ACUTE PAIN

## 2022-10-17 ASSESSMENT — PAIN DESCRIPTION - DESCRIPTORS: DESCRIPTORS: ACHING

## 2022-10-17 ASSESSMENT — PAIN DESCRIPTION - LOCATION: LOCATION: ELBOW

## 2022-10-17 ASSESSMENT — PAIN SCALES - GENERAL: PAINLEVEL_OUTOF10: 3

## 2022-10-17 NOTE — PROGRESS NOTES
[facility]  Outpatient Physical Therapy    Treatment Note        Date: 10/17/2022  Patient: Yesica Bach  : 1975   Confirmed: Yes  MRN: [id number]  Referring Provider: Evelio Kemp   Medical Diagnosis: Dislocation of left elbow, subsequent encounter [F01.105I] dislocation of L elbow Diagnosis: dislocation of L elbow   Treatment Diagnosis: decreased L elbow ROM, decreased L UE strength, decreased L hand sensation, decreased posture, and decreased activity tolerance and increased pain L elbow with normalized motion during ADLs    Visit Information:  Insurance: Payor: [employer] / Plan: [place] / Product Type: *No Product type* /   PT Visit Information  Onset Date:  (2022)  PT Insurance Information: [employer]  Total # of Visits Approved: 30 (per ronel yr- per discipline)  Total # of Visits to Date: 2  No Show: 0  Canceled Appointment: 0  Progress Note Counter: 2/10    Subjective Information:  Subjective: Pt presenting to appt today w/o Lt arm sling stating \"Last night was the first night with it. \" Pt reporting pain as \"constant ache that doesn't go away. \"  HEP Compliance:  [x] Good [] Fair [] Poor [] Reports not doing due to:    Pain Screening  Patient Currently in Pain: Yes  Pain Assessment: 0-10  Pain Level: 3  Pain Type: Acute pain  Pain Location: Elbow  Pain Descriptors: Aching    Treatment:  Exercises:  Exercises  Exercise 1: Wrist flex/ext stretches w/ elbow flexed 3x30\" ea  Exercise 2: scapular retraction 5\"x15  Exercise 3: shoulder rolls x15 CCW  Exercise 4: shoulder P/AROM*  Exercise 5: wrist AROM: flex/ext, UD/RD x10 ea  Exercise 6: elbow ext with pronation only with self overpressure 5\"x10 (not surpassing 70 deg)  Exercise 7: elbow supination/pronation in flexion x15  Exercise 8: Per MD: recommend no lifting pulling or pushing with the left upper extremity until the 6-week kierra from injury (22)  Exercise 20: HEP: scap retractions, shldr rolls, UT stretch, wrist flex/ext AROM and stretches     Modalities:  Cryotherapy (CPT 34125)  Patient Position: Seated  Number Minutes Cryotherapy: 10 min  Cryotherapy location: Left, Elbow  Cryotherapy specified location: Lt elbowd supported on arm rest; flexed position     *Indicates exercise, modality, or manual techniques to be initiated when appropriate    Objective Measures:      Strength: [x] NT  [] MMT completed:     ROM: [] NT  [x] ROM measurements:  AROM LUE (degrees)  L Elbow Flexion 0-145: 121 deg (limited by pain), 130 deg max per MD  L Elbow Extension 145-0: Pt achieves 70 deg ext; 40 deg (in pronation at neutral, arm at side)       Assessment: Body Structures, Functions, Activity Limitations Requiring Skilled Therapeutic Intervention: Decreased functional mobility , Decreased ROM, Decreased strength, Decreased sensation, Decreased endurance, Increased pain, Decreased posture  Assessment: Initiated PT program per POC w/ focus on improving areas of Lt wrist, elbow and shoulder ROM. At rest and outside of Lt arm sling, Lt elbow resting at 40 deg from full extension. Discussed w/ pt on current restrictions as directed by MD therefore, limited ext to 70 deg this week in which we will plan to increase by 10 deg ea wk. All exs tolerated well w/ direction on modification as needed. HEP provided and concluded session w/ CP for pain/swelling. Discussed frequency of 1x per wk to conserve visits until cleared to begin light strenthening on 11/6/22. Treatment Diagnosis: decreased L elbow ROM, decreased L UE strength, decreased L hand sensation, decreased posture, and decreased activity tolerance and increased pain L elbow with normalized motion during ADLs  Therapy Prognosis: Good     Post-Pain Assessment:       Pain Rating (0-10 pain scale):   0/10 \"it's numb\"  Location and pain description same as pre-treatment unless indicated.    Action: [x] NA   [] Perform HEP  [] Meds as prescribed  [] Modalities as prescribed   [] Call Physician     GOALS   Patient Goal(s): Patient Goals : \"full ROM and full use of arm\"    Short Term Goals Completed by 5 wks Goal Status   STG 1 Pt will demonstrate improved L shoulder and elbow AROM (within MD protocol) for return to full functional use of L UE. In progress   STG 2 Pt will demonstrate improved L UE strength (assessed after 6 weeks post injury) >/= 3+/5 for return to assisting pt's father with dressing and bathing. In progress     Long Term Goals Completed by 5 wks Goal Status   LTG 1 Pt will demonstrate indep and 100% compliance with HEP for self managent of symptoms. In progress   LTG 2 Pt will demonstrate improved score on UEFS >/= 30/80 for improved quality of life. In progress   LTG 3 Pt will demonstrate decreased L elbow pain with P/AROM </= 2/10 for return to full functional use of L UE. In progress     Plan:  Frequency/Duration:  Plan  Plan Frequency: 1-2 xs/wk  Plan weeks: 10  Specific Instructions for Next Treatment: inc elbow ext by 10 deg ea week  Current Treatment Recommendations: Strengthening, ROM, Functional mobility training, Endurance training, Manual Therapy - Soft Tissue Mobilization, Pain management, Home exercise program, Safety education & training, Patient/Caregiver education & training, Equipment evaluation, education, & procurement, Modalities, Positioning, Therapeutic activities  Additional Comments: transfer POC to [facility], PT  Pt to continue current HEP. See objective section for any therapeutic exercise changes, additions or modifications this date.     Therapy Time:      PT Individual Minutes  Time In: 6850  Time Out: 1435  Minutes: 48  Timed Code Treatment Minutes: 38 Minutes  Procedure Minutes: 10 min (CP)   Timed Activity Minutes Units   Ther Ex 38 3     Electronically signed by Love Cee PTA on 10/17/22 at 3:10 PM EDT

## 2022-10-18 ENCOUNTER — APPOINTMENT (OUTPATIENT)
Dept: PHYSICAL THERAPY | Age: 47
End: 2022-10-18
Payer: MEDICAID

## 2022-11-01 ENCOUNTER — TELEMEDICINE (OUTPATIENT)
Dept: FAMILY MEDICINE CLINIC | Age: 47
End: 2022-11-01
Payer: MEDICAID

## 2022-11-01 ENCOUNTER — HOSPITAL ENCOUNTER (OUTPATIENT)
Dept: PHYSICAL THERAPY | Age: 47
Setting detail: THERAPIES SERIES
Discharge: HOME | End: 2022-11-01
Payer: MEDICAID

## 2022-11-01 DIAGNOSIS — S53.105A DISLOCATED ELBOW, LEFT, INITIAL ENCOUNTER: ICD-10-CM

## 2022-11-01 DIAGNOSIS — F51.01 PRIMARY INSOMNIA: Primary | ICD-10-CM

## 2022-11-01 PROCEDURE — G8419 CALC BMI OUT NRM PARAM NOF/U: HCPCS | Performed by: NURSE PRACTITIONER

## 2022-11-01 PROCEDURE — 97110 THERAPEUTIC EXERCISES: CPT

## 2022-11-01 PROCEDURE — 97140 MANUAL THERAPY 1/> REGIONS: CPT

## 2022-11-01 PROCEDURE — G8484 FLU IMMUNIZE NO ADMIN: HCPCS | Performed by: NURSE PRACTITIONER

## 2022-11-01 PROCEDURE — 99214 OFFICE O/P EST MOD 30 MIN: CPT | Performed by: NURSE PRACTITIONER

## 2022-11-01 PROCEDURE — 4004F PT TOBACCO SCREEN RCVD TLK: CPT | Performed by: NURSE PRACTITIONER

## 2022-11-01 PROCEDURE — G8427 DOCREV CUR MEDS BY ELIG CLIN: HCPCS | Performed by: NURSE PRACTITIONER

## 2022-11-01 RX ORDER — TEMAZEPAM 15 MG/1
15 CAPSULE ORAL NIGHTLY PRN
Qty: 15 CAPSULE | Refills: 1 | Status: SHIPPED | OUTPATIENT
Start: 2022-11-01 | End: 2022-12-01

## 2022-11-01 RX ORDER — GABAPENTIN 100 MG/1
100 CAPSULE ORAL 3 TIMES DAILY
Qty: 90 CAPSULE | Refills: 0 | Status: SHIPPED | OUTPATIENT
Start: 2022-11-01 | End: 2022-12-01

## 2022-11-01 ASSESSMENT — PAIN SCALES - GENERAL: PAINLEVEL_OUTOF10: 3

## 2022-11-01 ASSESSMENT — ENCOUNTER SYMPTOMS
CHANGE IN BOWEL HABIT: 0
VOMITING: 0
SWOLLEN GLANDS: 0
ABDOMINAL PAIN: 0
NAUSEA: 0
VISUAL CHANGE: 0
COUGH: 0
SORE THROAT: 0

## 2022-11-01 ASSESSMENT — PAIN DESCRIPTION - DESCRIPTORS: DESCRIPTORS: ACHING

## 2022-11-01 ASSESSMENT — PAIN DESCRIPTION - LOCATION: LOCATION: ELBOW

## 2022-11-01 ASSESSMENT — PAIN DESCRIPTION - ORIENTATION: ORIENTATION: LEFT

## 2022-11-01 NOTE — PROGRESS NOTES
Argenis Valera (:  1975) is a Established patient, here for evaluation of the following:    Assessment & Plan   Below is the assessment and plan developed based on review of pertinent history, physical exam, labs, studies, and medications. 1. Primary insomnia  -     temazepam (RESTORIL) 15 MG capsule; Take 1 capsule by mouth nightly as needed for Sleep for up to 30 days. , Disp-15 capsule, R-1Normal  2. Dislocated elbow, left, initial encounter    Return in about 6 weeks (around 2022). Subjective   Insomnia  This is a chronic problem. The current episode started more than 1 year ago. The problem occurs daily. The problem has been unchanged. Associated symptoms include fatigue. Pertinent negatives include no abdominal pain, anorexia, arthralgias, change in bowel habit, chest pain, chills, congestion, coughing, diaphoresis, fever, headaches, joint swelling, myalgias, nausea, neck pain, numbness, rash, sore throat, swollen glands, urinary symptoms, vertigo, visual change, vomiting or weakness. Nothing aggravates the symptoms. Treatments tried: melatonin, benedryl, doxepine. The treatment provided no relief. Review of Systems   Constitutional:  Positive for fatigue. Negative for chills, diaphoresis and fever. HENT:  Negative for congestion and sore throat. Respiratory:  Negative for cough. Cardiovascular:  Negative for chest pain. Gastrointestinal:  Negative for abdominal pain, anorexia, change in bowel habit, nausea and vomiting. Musculoskeletal:  Negative for arthralgias, joint swelling, myalgias and neck pain. Skin:  Negative for rash. Neurological:  Negative for vertigo, weakness, numbness and headaches. Psychiatric/Behavioral:  The patient has insomnia.          Objective   Patient-Reported Vitals  No data recorded     Physical Exam  [INSTRUCTIONS:  \"[x]\" Indicates a positive item  \"[]\" Indicates a negative item  -- DELETE ALL ITEMS NOT EXAMINED]    Constitutional: [x] Appears well-developed and well-nourished [x] No apparent distress      [] Abnormal -     Mental status: [x] Alert and awake  [x] Oriented to person/place/time [x] Able to follow commands    [] Abnormal -     Eyes:   EOM    [x]  Normal    [] Abnormal -   Sclera  [x]  Normal    [] Abnormal -          Discharge [x]  None visible   [] Abnormal -     HENT: [x] Normocephalic, atraumatic  [] Abnormal -   [x] Mouth/Throat: Mucous membranes are moist    External Ears [x] Normal  [] Abnormal -    Neck: [x] No visualized mass [] Abnormal -     Pulmonary/Chest: [x] Respiratory effort normal   [x] No visualized signs of difficulty breathing or respiratory distress        [] Abnormal -      Musculoskeletal:   [x] Normal gait with no signs of ataxia         [x] Normal range of motion of neck        [] Abnormal -     Neurological:        [x] No Facial Asymmetry (Cranial nerve 7 motor function) (limited exam due to video visit)          [x] No gaze palsy        [] Abnormal -          Skin:        [x] No significant exanthematous lesions or discoloration noted on facial skin         [] Abnormal -            Psychiatric:       [x] Normal Affect [] Abnormal -        [x] No Hallucinations    Other pertinent observable physical exam findings:-         On this date 11/1/2022 I have spent 20 minutes reviewing previous notes, test results and face to face (virtual) with the patient discussing the diagnosis and importance of compliance with the treatment plan as well as documenting on the day of the visitKolby Henao, was evaluated through a synchronous (real-time) audio-video encounter. The patient (or guardian if applicable) is aware that this is a billable service, which includes applicable co-pays. This Virtual Visit was conducted with patient's (and/or legal guardian's) consent.  The visit was conducted pursuant to the emergency declaration under the [facility], 1135 waiver authority and the Coronavirus Preparedness and Response Supplemental Appropriations Act. Patient identification was verified, and a caregiver was present when appropriate. The patient was located at Home: [address] [place] [id number]. Provider was located at [facility] (Appt Dept): [address] [facility],  [id number] [place] [address].         --NATASHA Delacruz - CNP

## 2022-11-01 NOTE — PROGRESS NOTES
[facility]  Outpatient Physical Therapy    Treatment Note        Date: 2022  Patient: Tk Sinclair  : 1975   Confirmed: Yes  MRN: [id number]  Referring Provider: Evelio Huynh   Medical Diagnosis: Dislocation of left elbow, subsequent encounter [S54.105D] dislocation of L elbow Diagnosis: dislocation of L elbow   Treatment Diagnosis: decreased L elbow ROM, decreased L UE strength, decreased L hand sensation, decreased posture, and decreased activity tolerance and increased pain L elbow with normalized motion during ADLs    Visit Information:  Insurance: Payor: [employer] / Plan: Terri Hammer / Product Type: *No Product type* /   PT Visit Information  Onset Date:  (2022)  PT Insurance Information: [email or web address]  Total # of Visits Approved: 30 (per ronel yr- per discipline)  Total # of Visits to Date: 3  No Show: 0  Canceled Appointment: 0  Progress Note Counter: 3/10    Subjective Information:  Subjective: Pt reports having increased pain for 3 days following last session w/ poor ability to sleep. Pt has not been wearing sling w/ exception of going to grocery store. According to MD, pt able to advance to 50 deg extension this week. Pts also reports onset of LBP w/ decreased activity.   HEP Compliance:  [x] Good [] Fair [] Poor [] Reports not doing due to:    Pain Screening  Patient Currently in Pain: Yes  Pain Assessment: 0-10  Pain Level: 3  Pain Location: Elbow  Pain Orientation: Left  Pain Descriptors: Aching (constant)    Treatment:  Exercises:  Exercises  Exercise 1: Wrist flex/ext stretches w/ elbow flexed and rested on pillow 3x30\" ea  Exercise 2: scapular retraction 5\"x20, towel squeezes 5\"x20  Exercise 3: shoulder rolls/shugs x20  CCW  Exercise 4: shoulder AROM: table slides w/ elbow flexion 5\"x10 flex/ABD; shldr AAROM flex/scaption (w/ elbow flexion) 5\"x10 ea  Exercise 5: wrist AROM: flex/ext, UD/RD x10 ea  Exercise 6: elbow ext with pronation only with self overpressure 5\"x10 (not surpassing 50 deg)  Exercise 7: elbow supination/pronation in flexion x20  Exercise 8: Per MD: recommend no lifting pulling or pushing with the left upper extremity until the 6-week kierra from injury (11/6/22)  Exercise 20: HEP:     Manual:   Manual Therapy  Other: Lt shldr PROM: flex/ABD 6 min     Modalities:   Pt declined- utilizing CP at home     Objective Measures:       Strength: [x] NT  [] MMT completed:      ROM: [] NT  [x] ROM measurements:  AROM LUE (degrees)  L Shoulder Flexion 0-180: 100 deg (limited by pain),  deg  L Elbow Flexion 0-145: 97 deg (limited by pain- w/ supination), 130 deg max per MD  L Elbow Extension 145-0: Pt achieves 50 deg ext (in pronation)       Assessment: Body Structures, Functions, Activity Limitations Requiring Skilled Therapeutic Intervention: Decreased functional mobility , Decreased ROM, Decreased strength, Decreased sensation, Decreased endurance, Increased pain, Decreased posture  Assessment: Pt returning after >2 wk lapse in txs D/T availability issue. Pt allowed to extend Lt elbow to 50 deg this week in which pt achieves with end range discomfort. Pt experiencing inc pain w/ elbow flexion vs extension today. Addition of A/PROM to 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 Pt edu on both table slides and wand ex variations to incorporate AAROM shldr mobility into current routine. Pt edu on appropriate modifications to maintain elbow precautions. Treatment Diagnosis: decreased L elbow ROM, decreased L UE strength, decreased L hand sensation, decreased posture, and decreased activity tolerance and increased pain L elbow with normalized motion during ADLs  Therapy Prognosis: Good     Post-Pain Assessment:       Pain Rating (0-10 pain scale):   3/10   Location and pain description same as pre-treatment unless indicated.    Action: [] NA   [x] Perform HEP  [] Meds as prescribed  [x] Modalities as prescribed   [] Call Physician     GOALS   Patient Goal(s): Patient Goals : \"full ROM and full use of arm\"    Short Term Goals Completed by 5 wks Goal Status   STG 1 Pt will demonstrate improved L shoulder and elbow AROM (within MD protocol) for return to full functional use of L UE. In progress   STG 2 Pt will demonstrate improved L UE strength (assessed after 6 weeks post injury) >/= 3+/5 for return to assisting pt's father with dressing and bathing. In progress     Long Term Goals Completed by 5 wks Goal Status   LTG 1 Pt will demonstrate indep and 100% compliance with HEP for self managent of symptoms. In progress   LTG 2 Pt will demonstrate improved score on UEFS >/= 30/80 for improved quality of life. In progress   LTG 3 Pt will demonstrate decreased L elbow pain with P/AROM </= 2/10 for return to full functional use of L UE. In progress     Plan:  Frequency/Duration:  Plan  Plan Frequency: 1-2 xs/wk  Plan weeks: 10  Specific Instructions for Next Treatment: inc elbow ext by 10 deg ea week (50 deg)  Current Treatment Recommendations: Strengthening, ROM, Functional mobility training, Endurance training, Manual Therapy - Soft Tissue Mobilization, Pain management, Home exercise program, Safety education & training, Patient/Caregiver education & training, Equipment evaluation, education, & procurement, Modalities, Positioning, Therapeutic activities  Additional Comments: transfer POC to [facility], PT  Pt to continue current HEP. See objective section for any therapeutic exercise changes, additions or modifications this date.     Therapy Time:      PT Individual Minutes  Time In: 8236  Time Out: 1430  Minutes: 45  Timed Code Treatment Minutes: 45 Minutes  Procedure Minutes: N/A  Timed Activity Minutes Units   Ther Ex 39 3   Manual  6 0     Electronically signed by Tamika Bergman PTA on 11/1/22 at 3:09 PM EDT

## 2022-11-07 ENCOUNTER — HOSPITAL ENCOUNTER (OUTPATIENT)
Dept: PHYSICAL THERAPY | Age: 47
Setting detail: THERAPIES SERIES
Discharge: HOME | End: 2022-11-07
Payer: MEDICAID

## 2022-11-07 PROCEDURE — 97110 THERAPEUTIC EXERCISES: CPT

## 2022-11-07 ASSESSMENT — PAIN DESCRIPTION - PAIN TYPE: TYPE: ACUTE PAIN

## 2022-11-07 ASSESSMENT — PAIN SCALES - GENERAL: PAINLEVEL_OUTOF10: 3

## 2022-11-07 ASSESSMENT — PAIN DESCRIPTION - LOCATION: LOCATION: ELBOW;SHOULDER

## 2022-11-07 ASSESSMENT — PAIN DESCRIPTION - ORIENTATION: ORIENTATION: LEFT

## 2022-11-07 NOTE — PROGRESS NOTES
[address] [facility] [address] [facility]  Outpatient Physical Therapy    Treatment Note        Date: 2022  Patient: Heather Parish  : 1975   Confirmed: Yes  MRN: [id number]  Referring Provider: Greg Dean MD   Secondary Referring Provider (If applicable): none   Medical Diagnosis: Dislocation of left elbow, subsequent encounter [Y74.810K]    Treatment Diagnosis: decreased L elbow ROM, decreased L UE strength, decreased L hand sensation, decreased posture, and decreased activity tolerance and increased pain L elbow with normalized motion during ADLs    Visit Information:  Insurance: Payor: [employer] / Plan: Deborha Rump / Product Type: *No Product type* /   PT Visit Information  Onset Date:  (2022)  PT Insurance Information: [employer]  Total # of Visits Approved: 30 (per ronel yr- per discipline)  Total # of Visits to Date: 4  No Show: 0  Canceled Appointment: 0  Progress Note Counter: 4/10    Subjective Information:  Subjective: Pt reports she focused on shoulder mobility since last visit and reports ROM feels like it has gotten better, but hurts.   HEP Compliance:  [x] Good [] Fair [] Poor [] Reports not doing due to:    Pain Screening  Patient Currently in Pain: Yes  Pain Assessment: 0-10  Pain Level: 3  Pain Type: Acute pain  Pain Location: Elbow, Shoulder  Pain Orientation: Left    Treatment:  Exercises:  Exercises  Exercise 1: Wrist flex/ext stretches w/ elbow flexed and rested on pillow 3x30\" ea  Exercise 2: scapular retraction with elbows at 90 with YTB x10  Exercise 3: shoulder rolls/shugs x20  CCW  Exercise 4: Shoulder AAROM on wall within elbow precautions 5\"x10 flexion and ABD  Exercise 5: wrist AROM: flex/ext, UD/RD x10 ea with #1  Exercise 6: elbow ext with pronation only with self overpressure 5\"x10 (not surpassing 40 deg)  Exercise 7: elbow supination/pronation in flexion x10 with #1  Exercise 8: Per MD: recommend no lifting pulling or pushing with the left upper extremity until the 6-week kierra from injury (11/6/22)  Exercise 20: HEP:    ROM:     AROM LUE (degrees)  L Elbow Flexion 0-145: 110 deg (hard stop- w/ supination), 130 deg max per MD  L Elbow Extension 145-0: Pt achieves 40 deg ext (in pronation)          Modalities:  Cryotherapy (CPT 45325)  Patient Position: Seated  Number Minutes Cryotherapy: 10 min  Cryotherapy location: Left, Elbow  Cryotherapy specified location: Lt elbowd supported on arm rest; flexed position        Assessment: Body Structures, Functions, Activity Limitations Requiring Skilled Therapeutic Intervention: Decreased functional mobility , Decreased ROM, Decreased strength, Decreased sensation, Decreased endurance, Increased pain, Decreased posture  Assessment: Pt allowed to extend Lt elbow to 40 degree this week as well as lifting and pulling per doctor protocol. Pt with pain during light palpation to lateral elbow and complaints of shoulder pain specifically during AB and scaption. Progressed shoulder ROM to wall with elbow flexed, pt educated on staying within painfree ROM. Addition of #1 weights to wrist exercises to strengthening Lt UE.  CP applied to Lt elbow for pain and swelling post treatment. Recommended use of CP at home to help with pain and swelling. Treatment Diagnosis: decreased L elbow ROM, decreased L UE strength, decreased L hand sensation, decreased posture, and decreased activity tolerance and increased pain L elbow with normalized motion during ADLs  Therapy Prognosis: Good        Post-Pain Assessment:       Pain Rating (0-10 pain scale):   3/10   Location and pain description same as pre-treatment unless indicated.    Action: [] NA   [x] Perform HEP  [x] Meds as prescribed  [x] Modalities as prescribed   [] Call Physician     GOALS   Patient Goal(s): Patient Goals : \"full ROM and full use of arm\"    Short Term Goals Completed by 5 wks Goal Status   STG 1 Pt will demonstrate improved L shoulder and elbow AROM (within MD protocol) for return to full functional use of L UE. In progress   STG 2 Pt will demonstrate improved L UE strength (assessed after 6 weeks post injury) >/= 3+/5 for return to assisting pt's father with dressing and bathing. In progress       Long Term Goals Completed by 5 wks Goal Status   LTG 1 Pt will demonstrate indep and 100% compliance with HEP for self managent of symptoms. In progress   LTG 2 Pt will demonstrate improved score on UEFS >/= 30/80 for improved quality of life. In progress   LTG 3 Pt will demonstrate decreased L elbow pain with P/AROM </= 2/10 for return to full functional use of L UE. In progress            Plan:  Frequency/Duration:  Plan  Plan Frequency: 1-2 xs/wk  Plan weeks: 10  Specific Instructions for Next Treatment: inc elbow ext by 10 deg ea week (50 deg)  Current Treatment Recommendations: Strengthening, ROM, Functional mobility training, Endurance training, Manual Therapy - Soft Tissue Mobilization, Pain management, Home exercise program, Safety education & training, Patient/Caregiver education & training, Equipment evaluation, education, & procurement, Modalities, Positioning, Therapeutic activities  Additional Comments: transfer POC to [facility], PT  Pt to continue current HEP. See objective section for any therapeutic exercise changes, additions or modifications this date.     Therapy Time:      PT Individual Minutes  Time In: 1500  Time Out: 3823  Minutes: 48  Timed Code Treatment Minutes: 38 Minutes  Procedure Minutes: CP 10min  Timed Activity Minutes Units   Ther Ex 38 3     Electronically signed by Melanie Hamilton PTA on 11/7/22 at 4:11 PM EST

## 2022-11-15 ENCOUNTER — HOSPITAL ENCOUNTER (OUTPATIENT)
Dept: PHYSICAL THERAPY | Age: 47
Setting detail: THERAPIES SERIES
End: 2022-11-15
Payer: MEDICAID

## 2022-11-22 ENCOUNTER — HOSPITAL ENCOUNTER (OUTPATIENT)
Dept: PHYSICAL THERAPY | Age: 47
Setting detail: THERAPIES SERIES
Discharge: HOME | End: 2022-11-22
Payer: MEDICAID

## 2022-11-22 PROCEDURE — 97110 THERAPEUTIC EXERCISES: CPT

## 2022-11-22 ASSESSMENT — PAIN DESCRIPTION - ORIENTATION: ORIENTATION: LEFT

## 2022-11-22 ASSESSMENT — PAIN DESCRIPTION - FREQUENCY: FREQUENCY: CONTINUOUS

## 2022-11-22 ASSESSMENT — PAIN DESCRIPTION - LOCATION: LOCATION: ELBOW;SHOULDER

## 2022-11-22 ASSESSMENT — PAIN DESCRIPTION - PAIN TYPE: TYPE: ACUTE PAIN

## 2022-11-22 ASSESSMENT — PAIN DESCRIPTION - DESCRIPTORS: DESCRIPTORS: ACHING

## 2022-11-22 ASSESSMENT — PAIN SCALES - GENERAL: PAINLEVEL_OUTOF10: 2

## 2022-11-22 NOTE — PROGRESS NOTES
[facility]  Outpatient Physical Therapy    Treatment Note        Date: 2022  Patient: Tk Sinclair  : 1975   Confirmed: Yes  MRN: [id number]  Referring Provider: Evelio Huynh   Medical Diagnosis: Dislocation of left elbow, subsequent encounter [G36.180D]       Treatment Diagnosis: decreased L elbow ROM, decreased L UE strength, decreased L hand sensation, decreased posture, and decreased activity tolerance and increased pain L elbow with normalized motion during ADLs    Visit Information:  Insurance: Payor: [employer] / Plan: Terri Hammer / Product Type: *No Product type* /   PT Visit Information  Onset Date:  (2022)  PT Insurance Information: [employer]  Total # of Visits Approved: 30 (per ronel yr- per discipline)  Total # of Visits to Date: 5  No Show: 0  Canceled Appointment: 0  Progress Note Counter: 5/10  Referring Provider (secondary): none    Subjective Information:  Subjective: Pt presents to appt w/ eldery father accompanying her to therapy in W/C.  Pt notes having to care for dad noting still having to be cautious aroud pushing W/C and assisting w/ transfers, etc.  HEP Compliance:  [x] Good [] Fair [] Poor [] Reports not doing due to:    Pain Screening  Patient Currently in Pain: Yes  Pain Level: 2  Pain Type: Acute pain  Pain Location: Elbow, Shoulder  Pain Orientation: Left  Pain Descriptors: Aching  Pain Frequency: Continuous    Treatment:  Exercises:  Exercises  Exercise 1: Wrist flex/ext stretches w/ elbow flexed and rested on pillow 3x30\" ea  Exercise 2: Standing bicep curls (3 way) x10 ea  (within limited elbow ext)  Exercise 4: Supine wand flexion 5\"x10  Exercise 5: wrist AROM: flex/ext, UD/RD x10 ea with #1  Exercise 6: elbow ext with pronation only with self overpressure 5\"x10 (not surpassing 40 deg)  Exercise 7: elbow supination/pronation in flexion x10 with #1  Exercise 8: elbow flexion with supination self overpressure 5\"x10 (to 130 degreees)  Exercise 9: Elbow ext stretch (w/ towel prop) 3 min  Exercise 19: TB rows 2x10 YTB, wall push ups (within tolerable range) 2x10 (vertical standing position to avoid pain)  Exercise 20: HEP: wall push ups, TB rows, bicep curls     Modalities:   Pt declined     Objective Measures:      Strength: [x] NT  [] MMT completed:      ROM: [] NT  [x] ROM measurements:  AROM LUE (degrees)  L Elbow Flexion 0-145: 130 deg  L Elbow Extension 145-0: Pt achieves 25 deg ext (in pronation)       Assessment: Body Structures, Functions, Activity Limitations Requiring Skilled Therapeutic Intervention: Decreased functional mobility , Decreased ROM, Decreased strength, Decreased sensation, Decreased endurance, Increased pain, Decreased posture  Assessment: Pt returning to appt after 2 wk lapse in txs D/T having to priortize care of her father. Pt is now >8wk post initial injury in which according to MD, able to initiate gentle pushing/pulling use of Lt UE after 6 wks. Exs initiated for gradual return to functional use as tolerated. Exs were tolerated well given modifications as needed and within ongoing limitation in elbow ext ROM. Elbow flex/ext continuing to improve. Will cont to address UE strength as appropriate. Will consider reaching out to orthopedic to clarify progression of strengthening/ROM as pt was unable to attend recent F/U; has not yet rescheduled. Pt requesting shortened tx today. Treatment Diagnosis: decreased L elbow ROM, decreased L UE strength, decreased L hand sensation, decreased posture, and decreased activity tolerance and increased pain L elbow with normalized motion during ADLs     Post-Pain Assessment:       Pain Rating (0-10 pain scale):   2/10   Location and pain description same as pre-treatment unless indicated.    Action: [] NA   [x] Perform HEP  [] Meds as prescribed  [x] Modalities as prescribed   [] Call Physician     GOALS   Patient Goal(s): Patient Goals : \"full ROM and full use of arm\"    Short Term Goals Completed by 5 wks Goal Status   STG 1 Pt will demonstrate improved L shoulder and elbow AROM (within MD protocol) for return to full functional use of L UE. In progress   STG 2 Pt will demonstrate improved L UE strength (assessed after 6 weeks post injury) >/= 3+/5 for return to assisting pt's father with dressing and bathing. In progress     Long Term Goals Completed by 5 wks Goal Status   LTG 1 Pt will demonstrate indep and 100% compliance with HEP for self managent of symptoms. In progress   LTG 2 Pt will demonstrate improved score on UEFS >/= 30/80 for improved quality of life. In progress   LTG 3 Pt will demonstrate decreased L elbow pain with P/AROM </= 2/10 for return to full functional use of L UE. In progress     Plan:  Frequency/Duration:  Plan  Plan Frequency: 1-2 xs/wk  Plan weeks: 10  Specific Instructions for Next Treatment: inc elbow ext by 10 deg ea week (50 deg)  Current Treatment Recommendations: Strengthening, ROM, Functional mobility training, Endurance training, Manual Therapy - Soft Tissue Mobilization, Pain management, Home exercise program, Safety education & training, Patient/Caregiver education & training, Equipment evaluation, education, & procurement, Modalities, Positioning, Therapeutic activities  Additional Comments[facility]  Pt to continue current HEP. See objective section for any therapeutic exercise changes, additions or modifications this date.     Therapy Time:      PT Individual Minutes  Time In: 3952  Time Out: 1600  Minutes: 30  Timed Code Treatment Minutes: 30 Minutes  Procedure Minutes: N/A   Timed Activity Minutes Units   Ther Ex 30 2     Electronically signed by Marjorie Napoles PTA on 11/22/22 at 5:21 PM EST

## 2022-11-29 ENCOUNTER — HOSPITAL ENCOUNTER (OUTPATIENT)
Dept: PHYSICAL THERAPY | Age: 47
Setting detail: THERAPIES SERIES
Discharge: HOME | End: 2022-11-29
Payer: MEDICAID

## 2022-11-29 PROCEDURE — 97110 THERAPEUTIC EXERCISES: CPT

## 2022-11-29 ASSESSMENT — PAIN DESCRIPTION - PAIN TYPE: TYPE: ACUTE PAIN

## 2022-11-29 ASSESSMENT — PAIN DESCRIPTION - DESCRIPTORS: DESCRIPTORS: ACHING

## 2022-11-29 ASSESSMENT — PAIN DESCRIPTION - ORIENTATION: ORIENTATION: LEFT

## 2022-11-29 ASSESSMENT — PAIN SCALES - GENERAL: PAINLEVEL_OUTOF10: 3

## 2022-11-29 ASSESSMENT — PAIN DESCRIPTION - LOCATION: LOCATION: SHOULDER

## 2022-11-30 NOTE — PROGRESS NOTES
[facility]  Outpatient Physical Therapy    Treatment Note        Date: 2022  Patient: Michael Dejesus  : 1975   Confirmed: Yes  MRN: [id number]  Referring Provider: Evelio Mera   Medical Diagnosis: Dislocation of left elbow, subsequent encounter [S58.685U]       Treatment Diagnosis: decreased L elbow ROM, decreased L UE strength, decreased L hand sensation, decreased posture, and decreased activity tolerance and increased pain L elbow with normalized motion during ADLs    Visit Information:  Insurance: Payor: Darrell Dena / Plan: [employer]yl [employer] / Product Type: *No Product type* /   PT Visit Information  Onset Date:  (2022)  PT Insurance Information: [employer]  Total # of Visits Approved: 30 (per ronel yr- per discipline)  Total # of Visits to Date: 6  No Show: 0  Canceled Appointment: 0  Progress Note Counter: 6/10  Referring Provider (secondary): none    Subjective Information:  Subjective: Pt states \"The shoulder has really been hurtin me and I also woke up with somethin going on in my upper back. \"  HEP Compliance:  [x] Good [] Fair [] Poor [] Reports not doing due to:    Pain Screening  Patient Currently in Pain: Yes  Pain Assessment: 0-10  Pain Level: 3  Pain Type: Acute pain  Pain Location: Shoulder  Pain Orientation: Left  Pain Descriptors: Aching    Treatment:  Exercises:  Exercises  Exercise 1: Wrist flex/ext stretches w/ elbow flexed and rested on pillow 3x30\" ea  Exercise 2: Standing bicep curls (3 way) x10 ea  (within limited elbow ext) w/ 1# wt  Exercise 3: Pulley's 4 min  Exercise 4: Wrist pronation/supination w/ tiger tail x10  Exercise 5: wrist AROM: flex/ext w/ 1# wt on string , UD/RD x10 ea with #1  Exercise 6: elbow ext with pronation only with self overpressure 5\"x10 (not surpassing 40 deg)  Exercise 7: WebSlide (single arm) elbow ext w/ YTB  x10, limited elbow extension; row x10  Exercise 8: elbow flexion with supination self overpressure 5\"x10 (to 130 degreees)  Exercise 9: Elbow ext stretch (w/ towel prop) 3 min  Exercise 19: Table slides (flex and ABD) 5\"x10 ea  Exercise 20: HEP: table slides, single arm tricep exts and rows     Objective Measures:      Strength: [x] NT  [] MMT completed:     ROM: [] NT  [x] ROM measurements:  AROM LUE (degrees)  L Elbow Flexion (0-145): 138 deg  L Elbow Extension (145-0): Pt achieves 25 deg ext (in pronation)       Assessment: Body Structures, Functions, Activity Limitations Requiring Skilled Therapeutic Intervention: Decreased functional mobility , Decreased ROM, Decreased strength, Decreased sensation, Decreased endurance, Increased pain, Decreased posture  Assessment: No further clarification provided by Orthopedic office at this time. Pt able to work towards 10 deg elbow extension this week according to initial tx plan w/ MD. Pt currently achieving 25 deg from neutral elbow ext w/ mild discomfort. Aside from progressions w/ Lt elbow, pt c/o persistent Lt shldr pain gadually increasing since initial incident and recent inc in [facility]. Therapist discussed possible over working w/ pushing dad in [place] [id number] and assisting w/ transfers at home. Pt was encouraged to contact MD office re: shldr pain and F/U of Lt elbow. Gentle strengthening tolerated well w/ modifications as needed. Treatment Diagnosis: decreased L elbow ROM, decreased L UE strength, decreased L hand sensation, decreased posture, and decreased activity tolerance and increased pain L elbow with normalized motion during ADLs  Therapy Prognosis: Good     Post-Pain Assessment:       Pain Rating (0-10 pain scale):   3/10   Location and pain description same as pre-treatment unless indicated.    Action: [] NA   [x] Perform HEP  [] Meds as prescribed  [x] Modalities as prescribed   [] Call Physician     GOALS   Patient Goal(s): Patient Goals : \"full ROM and full use of arm\"    Short Term Goals Completed by 5 wks Goal Status   STG 1 Pt will demonstrate improved L shoulder and elbow AROM (within MD protocol) for return to full functional use of L UE. In progress   STG 2 Pt will demonstrate improved L UE strength (assessed after 6 weeks post injury) >/= 3+/5 for return to assisting pt's father with dressing and bathing. In progress     Long Term Goals Completed by 5 wks Goal Status   LTG 1 Pt will demonstrate indep and 100% compliance with HEP for self managent of symptoms. In progress   LTG 2 Pt will demonstrate improved score on UEFS >/= 30/80 for improved quality of life. In progress   LTG 3 Pt will demonstrate decreased L elbow pain with P/AROM </= 2/10 for return to full functional use of L UE. In progress     Plan:  Frequency/Duration:  Plan  Plan Frequency: 1-2 xs/wk  Plan weeks: 10  Specific Instructions for Next Treatment: inc elbow ext by 10 deg ea week  Current Treatment Recommendations: Strengthening, ROM, Functional mobility training, Endurance training, Manual Therapy - Soft Tissue Mobilization, Pain management, Home exercise program, Safety education & training, Patient/Caregiver education & training, Equipment evaluation, education, & procurement, Modalities, Positioning, Therapeutic activities  Additional Comments: transfer POC to [facility] PT  Pt to continue current HEP. See objective section for any therapeutic exercise changes, additions or modifications this date.     Therapy Time:      PT Individual Minutes  Time In: 6996  Time Out: 1600  Minutes: 45  Timed Code Treatment Minutes: 45 Minutes  Procedure Minutes: N/A   Timed Activity Minutes Units   Ther Ex 45 3     Electronically signed by Flory Gonzales PTA on 11/30/22 at 1:04 PM EST

## 2022-12-01 ENCOUNTER — OFFICE VISIT (OUTPATIENT)
Dept: ORTHOPEDIC SURGERY | Age: 47
End: 2022-12-01
Payer: MEDICAID

## 2022-12-01 VITALS
HEART RATE: 84 BPM | HEIGHT: 69 IN | OXYGEN SATURATION: 96 % | DIASTOLIC BLOOD PRESSURE: 60 MMHG | WEIGHT: 189 LBS | SYSTOLIC BLOOD PRESSURE: 100 MMHG | BODY MASS INDEX: 27.99 KG/M2 | TEMPERATURE: 97.2 F

## 2022-12-01 DIAGNOSIS — S53.105D DISLOCATION OF LEFT ELBOW, SUBSEQUENT ENCOUNTER: ICD-10-CM

## 2022-12-01 DIAGNOSIS — S43.402S SPRAIN OF LEFT SHOULDER, UNSPECIFIED SHOULDER SPRAIN TYPE, SEQUELA: Primary | ICD-10-CM

## 2022-12-01 PROCEDURE — 99203 OFFICE O/P NEW LOW 30 MIN: CPT | Performed by: PHYSICIAN ASSISTANT

## 2022-12-01 PROCEDURE — G8419 CALC BMI OUT NRM PARAM NOF/U: HCPCS | Performed by: PHYSICIAN ASSISTANT

## 2022-12-01 PROCEDURE — G8427 DOCREV CUR MEDS BY ELIG CLIN: HCPCS | Performed by: PHYSICIAN ASSISTANT

## 2022-12-01 PROCEDURE — G8484 FLU IMMUNIZE NO ADMIN: HCPCS | Performed by: PHYSICIAN ASSISTANT

## 2022-12-01 PROCEDURE — 4004F PT TOBACCO SCREEN RCVD TLK: CPT | Performed by: PHYSICIAN ASSISTANT

## 2022-12-01 NOTE — PROGRESS NOTES
[facility]      Subjective:      Chief Complaint   Patient presents with    Follow-up     Patient is following up on left elbow dislocation       HPI: Mena Carrero is a 51 y.o. female who is here after a left elbow dislocation and reduction by Dr. Dania Lopez which was 2 months ago. Working with therapy. She has improvement of her motion. She still gets some occasional pain in the elbow. Her biggest complaint today is her shoulder on the same side. Since her injury she has been noticing clicking and popping every time she goes overhead.'s mainly in the front of her shoulder. She has no profound weakness. She has no significant loss of motion. She has not worked with any therapy for this. No recent injections, anti-inflammatories. Past Medical History:   Diagnosis Date    Depression       Past Surgical History:   Procedure Laterality Date    BREAST ENHANCEMENT SURGERY       Social History     Socioeconomic History    Marital status: Single     Spouse name: Not on file    Number of children: Not on file    Years of education: Not on file    Highest education level: Not on file   Occupational History    Not on file   Tobacco Use    Smoking status: Every Day     Packs/day: 1.00     Types: Cigarettes    Smokeless tobacco: Never   Vaping Use    Vaping Use: Never used   Substance and Sexual Activity    Alcohol use:  Yes     Alcohol/week: 4.0 standard drinks     Types: 4 Glasses of wine per week    Drug use: No    Sexual activity: Not on file   Other Topics Concern    Not on file   Social History Narrative    Not on file     Social Determinants of Health     Financial Resource Strain: Low Risk     Difficulty of Paying Living Expenses: Not hard at all   Food Insecurity: No Food Insecurity    Worried About Running Out of Food in the Last Year: Never true    Ran Out of Food in the Last Year: Never true   Transportation Needs: Not on file   Physical Activity: Not on file   Stress: Not on file   Social Connections: Not on file   Intimate Partner Violence: Not on file   Housing Stability: Not on file     No family history on file. No Known Allergies  Current Outpatient Medications on File Prior to Visit   Medication Sig Dispense Refill    temazepam (RESTORIL) 15 MG capsule Take 1 capsule by mouth nightly as needed for Sleep for up to 30 days. 15 capsule 1    gabapentin (NEURONTIN) 100 MG capsule Take 1 capsule by mouth 3 times daily for 30 days. 90 capsule 0    atorvastatin (LIPITOR) 10 MG tablet take 1 tablet by mouth once daily 30 tablet 5    albuterol sulfate HFA (PROVENTIL HFA) 108 (90 Base) MCG/ACT inhaler Inhale 2 puffs into the lungs every 6 hours as needed for Wheezing 1 each 3    ibuprofen (IBU) 600 MG tablet Take 1 tablet by mouth every 6 hours as needed for Pain 120 tablet 0    lisinopril (PRINIVIL;ZESTRIL) 20 MG tablet Take 1 tablet by mouth daily 30 tablet 11     Current Facility-Administered Medications on File Prior to Visit   Medication Dose Route Frequency Provider Last Rate Last Admin    levonorgestrel (MIRENA) IUD 52 mg 1 each  1 each IntraUTERine Once Kell Turner, NATASHA - CNP   1 each at 07/13/20 0942       Objective:   /60 (Site: Right Upper Arm, Position: Sitting, Cuff Size: Large Adult)   Pulse 84   Temp 97.2 °F (36.2 °C) (Temporal)   Ht 5' 9\" (1.753 m)   Wt 189 lb (85.7 kg)   SpO2 96%   BMI 27.91 kg/m²       Radiographs and Laboratory Studies:   Previous diagnostic imaging studies were reviewed. The left elbow he has full flexion. She has about 15 degrees off full extension. She has normal pronation supination. She has 4-5 strength with extension however 5 out of 5 strength with all of her motions. She has some mild pain around the olecranon / lateral condyle. Exam the left shoulder she has about 80% of her motion with flexion and abduction. She has normal internal rotation which alleviates her pain. She has full external rotation.   Is about 4-5 strength per the contralateral side in the setting of pain. She has pain at the anterior portion of the glenohumeral joint. Laboratory Studies:   Lab Results   Component Value Date    WBC 6.2 09/29/2022    HGB 12.4 09/29/2022    HCT 37.2 09/29/2022    MCV 95.5 09/29/2022     09/29/2022     Lab Results   Component Value Date    SEDRATE 18 09/29/2022     Lab Results   Component Value Date    CRP 3.4 09/29/2022       Assessment and Plan:      Diagnosis Orders   1. Sprain of left shoulder, unspecified shoulder sprain type, sequela  Ambulatory referral to Physical Therapy      2. Dislocation of left elbow, subsequent encounter  Ambulatory referral to Physical Therapy          Here 2 months after dislocation of the left elbow. She is working with therapy. She is restoring motion. She still has some lag with extension which we need to continue to work on with therapy. She is taking anti-inflammatories for pain so we will continue doing that as well. She is now complaining of left shoulder pain which may have been also an injury that occurred with her initial fall and dislocation of the elbow. Is mainly in the front, clicks and pops and it hurts especially when she goes any with overhead activities. Based on clinical evaluation as well as physical exam she may have injured her labrum. She denies any dislocation. We will send her to therapy for this. She denies any injection and will continue with anti-inflammatories for pain. If there is no improvement in 6 weeks we will consider an MRI which is when ill see her back      The above plan was discussed in thorough detail with Dr. Delbert Krabbe and the patient. No orders of the defined types were placed in this encounter. No orders of the defined types were placed in this encounter. No follow-ups on file.     Cleo Milligan PA-C  [facility]  [phone]

## 2022-12-05 ENCOUNTER — HOSPITAL ENCOUNTER (OUTPATIENT)
Dept: PHYSICAL THERAPY | Age: 47
Setting detail: THERAPIES SERIES
Discharge: HOME | End: 2022-12-05
Payer: MEDICAID

## 2022-12-05 PROCEDURE — 97110 THERAPEUTIC EXERCISES: CPT

## 2022-12-05 PROCEDURE — 97162 PT EVAL MOD COMPLEX 30 MIN: CPT

## 2022-12-05 ASSESSMENT — PAIN DESCRIPTION - PAIN TYPE: TYPE: ACUTE PAIN

## 2022-12-05 ASSESSMENT — PAIN DESCRIPTION - DESCRIPTORS: DESCRIPTORS: DULL

## 2022-12-05 ASSESSMENT — PAIN DESCRIPTION - LOCATION: LOCATION: SHOULDER

## 2022-12-05 ASSESSMENT — PAIN SCALES - GENERAL: PAINLEVEL_OUTOF10: 3

## 2022-12-05 NOTE — PLAN OF CARE
[facility]  PHYSICAL THERAPY PLAN OF CARE   [facility]      [address], [address] [facility], [address] [address]     Ph: [phone] Fax: [phone]      [] Certification  [] Recertification []  Plan of Care  [] Progress Note [] Discharge      Referring Provider: Maria Luisa Nguyen MD  Referring Provider (secondary): none   From:  Chandu Sharif, [facility] [address]  Patient: Gregoria Snellen (75 y.o. female) : 1975 Date: 2022   Medical Diagnosis: Dislocation of left elbow, subsequent encounter [U72.370L] dislocation of L elbow, Lt shoulder sprain  Treatment Diagnosis: decreased L elbow ROM, decreased L UE strength, decreased L hand sensation, decreased posture, and decreased activity tolerance and increased pain L elbow with normalized motion during ADLs, Lt shoulder pain, decreased Lt shoulder ROM    Plan of Care/Certification Expiration Date: :     Progress Report Period from:  2022  to 2022    Visits to Date: 7 No Show: 0 Cancelled Appts: 0    OBJECTIVE:   Short Term Goals - Time Frame for Short Term Goals: 5 wks    Goals Current/Discharge status  Status   Short Term Goal 1: Pt will demonstrate improved L shoulder and elbow AROM (within MD protocol) for return to full functional use of L UE.   AROM LUE (degrees)  LUE General AROM: carrying angle 34 degrees supine with elbow extension  L Shoulder Flexion (0-180): 162  L Shoulder ABduction (0-180): 111  L Shoulder Int Rotation  (0-70): WFL  L Shoulder Ext Rotation  (0-90): 68 with c/o pain anterior shoulder  L Elbow Extension (145-0): lacking 8 degrees extension in supine with supination, 5 degrees with pronation    In progress   Short Term Goal 2: Pt will demonstrate improved L UE strength (assessed after 6 weeks post injury) >/= 3+/5 for return to assisting pt's father with dressing and bathing.    Strength LUE  L Shoulder Flexion: 4/5  L Shoulder Extension: 4+/5  L Shoulder ABduction: 4-/5  L Shoulder Internal Rotation: 4/5  L Shoulder External Rotation: 4-/5    In progress     Long Term Goals - Time Frame for Long Term Goals : 5 wks  Goals Current/ Discharge status Status   Long Term Goal 1: Pt will demonstrate indep and 100% compliance with HEP for self managent of symptoms. Independent with current HEP, needs progressed  In progress   Long Term Goal 2: Pt will demonstrate improved score on UEFS >/= 30/80 for improved quality of life. NT  In progress   Long Term Goal 3: Pt will demonstrate decreased L elbow pain with P/AROM </= 2/10 for return to full functional use of L UE. Pt with increased pain with elbow PROM/ AROM with continued limitations    In progress   Long Term Goal 4: Pt will demonstrate improved Lt shoulder AROM WFL to complete ADLs  AROM LUE (degrees)  LUE General AROM: carrying angle 34 degrees supine with elbow extension  L Shoulder Flexion (0-180): 162  L Shoulder ABduction (0-180): 111  L Shoulder Int Rotation  (0-70): WFL  L Shoulder Ext Rotation  (0-90): 68 with c/o pain anterior shoulder  L Elbow Extension (145-0): lacking 8 degrees extension in supine with supination, 5 degrees with pronation    New     Body Structures, Functions, Activity Limitations Requiring Skilled Therapeutic Intervention: Decreased functional mobility , Decreased ROM, Decreased strength, Decreased sensation, Decreased endurance, Increased pain, Decreased posture  Assessment: Pt is 10 wks post injury to Lt elbow. Pt presents today with order for spring of left shoulder in addition to Lt elbow injury. Evaluation completed for Lt shoulder sprain diagnosis. Noted tenderness clavicle and anterior shoulder. Pt Bharati and Mendoza. Pt  with increased spasm and tightness Lt UT, SCM, and levator. (-) instability test. (+) pain with empty can, but no sig weakness. Pt would benefit from skilled PT to address shoulder deficits and return to previous function with minimal c/o pain.   Specific Instructions for Next Treatment: inc elbow ext by 10 deg ea week  Therapy Prognosis: Good      PT Education: Goals;PT Role;Plan of Care;Evaluative findings; Insurance;Home Exercise Program    PLAN: [x] Evaluate and Treat  Frequency/Duration:  Plan Frequency: 1-2 xs/wk  Plan weeks: 10  Specific Instructions for Next Treatment: inc elbow ext by 10 deg ea week  Current Treatment Recommendations: Strengthening, ROM, Manual, Home exercise program, Safety education & training, Patient/Caregiver education & training, Equipment evaluation, education, & procurement, Modalities, Dry needling  Modalities: Ultrasound, E-stim - unattended                   Patient Status:[x] Continue/ Initiate plan of Care    [] Discharge PT. Recommend pt continue with HEP. [] Additional visits requested, Please re-certify for additional visits:    [] Hold         Signature: Electronically signed by Conchita Wills PT on 12/5/22 at 5:14 PM EST    If you have any questions or concerns, please don't hesitate to call. Thank you for your referral.    I have reviewed this plan of care and certify a need for medically necessary rehabilitation services.     Physician Signature:__________________________________________________________  Date:  Please sign and return

## 2022-12-05 NOTE — PROGRESS NOTES
[address] [facility]   PHYSICAL THERAPY EVALUATION      Physical Therapy: Initial Evaluation    Patient: Rsoie Giles (89 y.o.     female)   Examination Date:   Plan of Care Certification Period: 2022 to    Progress Note Counter: 7/10   :  1975 ;    Confirmed: Yes MRN: [id number]  CSN: [id number]   Insurance: Payor: Dulce Maria Hunter / Plan: Marcille Hodgkins / Product Type: *No Product type* /   Insurance ID: [id number] - (Medicaid Managed) Secondary Insurance (if applicable):    Referring Physician: Lior Dickson MD none   PCP: NATASHA Mitatl - CNP Visits to Date/Visits Approved:  (per ronel yr- per discipline)    No Show/Cancelled Appts: 0  0     Medical Diagnosis: Dislocation of left elbow, subsequent encounter [S52.811Q]    Treatment Diagnosis: decreased L elbow ROM, decreased L UE strength, decreased L hand sensation, decreased posture, and decreased activity tolerance and increased pain L elbow with normalized motion during ADLs, Lt shoulder pain, decreased Lt shoulder ROM     PERTINENT MEDICAL HISTORY           Medical History:     Past Medical History:   Diagnosis Date    Depression      Surgical History:   Past Surgical History:   Procedure Laterality Date    BREAST ENHANCEMENT SURGERY         Medications:   Current Outpatient Medications:     gabapentin (NEURONTIN) 100 MG capsule, Take 1 capsule by mouth 3 times daily for 30 days. , Disp: 90 capsule, Rfl: 0    atorvastatin (LIPITOR) 10 MG tablet, take 1 tablet by mouth once daily, Disp: 30 tablet, Rfl: 5    albuterol sulfate HFA (PROVENTIL HFA) 108 (90 Base) MCG/ACT inhaler, Inhale 2 puffs into the lungs every 6 hours as needed for Wheezing, Disp: 1 each, Rfl: 3    ibuprofen (IBU) 600 MG tablet, Take 1 tablet by mouth every 6 hours as needed for Pain, Disp: 120 tablet, Rfl: 0    lisinopril (PRINIVIL;ZESTRIL) 20 MG tablet, Take 1 tablet by mouth daily, Disp: 30 tablet, Rfl: 11    Current Facility-Administered Medications:     levonorgestrel (MIRENA) IUD 52 mg 1 each, 1 each, IntraUTERine, Once, Kell Turner, APRN - CNP, 1 each at 07/13/20 3362  Allergies: Patient has no known allergies. SUBJECTIVE EXAMINATION      ,           Subjective History: Onset Date:  (9/25/2022)  Subjective: Pt reporting to PT after recent F/U w/ MD on 12/1/22. Pt expressing with dr concerns of Lt shldr pain. Pt wishing to take conservatiove approach and avoid injections/surgery. Pt presents with new order for Lt shoulder sprain. Re-evaluation completed.   Additional Pertinent Hx (if applicable):     Comment: RTD 6 wks      Learning/Language:       Pain Screening    Pain Screening  Patient Currently in Pain: Yes  Pain Level: 3  Pain Type: Acute pain  Pain Location: Shoulder  Pain Descriptors: Dull    Functional Status    Social History:    Social History  Lives With:  (father- pt is caregiver for her father)  Type of Home: House    Occupation/Interests:   Occupation: Caregiver    Current Level of Function:          ADL Assistance: Independent  Homemaking Assistance: Independent  Homemaking Responsibilities: Yes  Ambulation Assistance: Independent  Transfer Assistance: Independent  Active : Yes         OBJECTIVE EXAMINATION     Restrictions:          Elbow restrictions, increase 10 degrees each week, no shoulder restrictions at this time     Review of Systems:  Vision: Within Functional Limits  Hearing: Within functional limits  Overall Orientation Status: Within Functional Limits  Patient affect[de-identified] Normal  Follows Commands: Within Functional Limits    Observations:    Guarded posture     Palpation:   Left Shoulder Palpation: tenderness with palpation clavicle, anterior GH    Left AROM  Right AROM         AROM LUE (degrees)  LUE General AROM: carrying angle 34 degrees supine with elbow extension  L Shoulder Flexion (0-180): 162  L Shoulder ABduction (0-180): 111  L Shoulder Int Rotation  (0-70): WFL  L Shoulder Ext Rotation  (0-90): 68 with c/o pain anterior shoulder  L Elbow Extension (145-0): lacking 8 degrees extension in supine with supination, 5 degrees with pronation  WFL         Left Strength  Right Strength         Strength LUE  L Shoulder Flexion: 4/5  L Shoulder Extension: 4+/5  L Shoulder ABduction: 4-/5  L Shoulder Internal Rotation: 4/5  L Shoulder External Rotation: 4-/5     4+ to 5/5 grossly     Cervical Assessment      Tenderness Lt UT with increased tightness and guarding            Special Tests:   Impingement Tests  Neer Test: L (+), R (-)  Grant Lucio Test: L (-), R (-)  Labral Tests  Clunk Test (Labrum): R (-), L (-)    Outcomes Score:  Exam: musculoskeletal system involved impacting strength, ROM, ADLs, sleep, IADLs        Treatment:    Exercises:   Exercises  Exercise 10: shrugs, rolls, retraction x10  Exercise 11: * prone scap  Exercise 12: * cane exs  Exercise 13: * pulleys  Exercise 14: * add rows and lats  Exercise 15: * UBE  Exercise 16: *     Modalities:  Modalities:  (* PRN US 3.0 MHz, 1.2 w/cm2)     Manual:  Manual Therapy  Joint Mobilization: * inferior Gr I, II mobs  Soft Tissue Mobilizaton: tennis ball STM, * MFR UT, SCM  Treatment Reasoning  Limitations addressed: Joint motion, Tissue extensibility  *Indicates exercise,modality, or manual techniques to be initiated when appropriate       ASSESSMENT     Impression: Assessment: Pt is 10 wks post injury to Lt elbow. Pt presents today with order for spring of left shoulder in addition to Lt elbow injury. Evaluation completed for Lt shoulder sprain diagnosis. Noted tenderness clavicle and anterior shoulder. Pt Bharati and Mendoza. Pt  with increased spasm and tightness Lt UT, SCM, and levator. (-) instability test. (+) pain with empty can, but no sig weakness.  Pt would benefit from skilled PT to address shoulder deficits and return to previous function with minimal c/o pain.    Body Structures, Functions, Activity Limitations Requiring Skilled Therapeutic Intervention: Decreased functional mobility , Decreased ROM, Decreased strength, Decreased sensation, Decreased endurance, Increased pain, Decreased posture    Statement of Medical Necessity: Physical Therapy is both indicated and medically necessary as outlined in the POC to increase the likelihood of meeting the functionally related goals stated below. Patient's Activity Tolerance:        Patient's rehabilitation potential/prognosis is considered to be: Good    Factors which may impact rehabilitation potential include: None  Measures taken to address barrier(s): N/A  Patient Education: Goals, PT Role, Plan of Care, Evaluative findings, Insurance, Home Exercise Program      GOALS   Patient Goal(s): Patient Goals : \"full ROM and full use of arm\"    Short Term Goals Completed by 5 wks Goal Status   Pt will demonstrate improved L shoulder and elbow AROM (within MD protocol) for return to full functional use of L UE. In progress   Pt will demonstrate improved L UE strength (assessed after 6 weeks post injury) >/= 3+/5 for return to assisting pt's father with dressing and bathing. In progress     Long Term Goals Completed by 5 wks Goal Status   Pt will demonstrate indep and 100% compliance with HEP for self managent of symptoms. In progress   Pt will demonstrate improved score on UEFS >/= 30/80 for improved quality of life. In progress   Pt will demonstrate decreased L elbow pain with P/AROM </= 2/10 for return to full functional use of L UE.  In progress   Pt will demonstrate improved Lt shoulder AROM WFL to complete ADLs New        TREATMENT PLAN       Requires PT Follow-Up: Yes  Specific Instructions for Next Treatment: inc elbow ext by 10 deg ea week    Treatment may include any combination of the following: Strengthening, ROM, Manual, Home exercise program, Safety education & training, Patient/Caregiver education & training, Equipment evaluation, education, & procurement, Modalities, Dry needling     Frequency / Duration:  Patient to be seen 1-2 xs/wk times per week for 10 weeks  Plan Comment:               Eval Complexity:   Decision Making: Medium Complexity  History: personal factors: stress; contributing PMH: GERD, HTN, depression, anxiety, elbow dislocation, shoulder sprain  Exam: musculoskeletal system involved impacting strength, ROM, ADLs, sleep, IADLs  Clinical Presentation: Medium  Clinical Presentation: complicated, evolving    POST-PAIN     Pain Rating (0-10 pain scale):   3/10, \"but looser after massage\"   Location and pain description same as pre-treatment unless indicated. Action: [] NA  [] Call Physician  [x] Perform HEP  [x] Meds as prescribed    Evaluation and patient rights have been reviewed and patient agrees with plan of care. Yes  [x]  No  []   Explain:     Ursula Fall Risk Assessment  Risk Factor Scale  Score   History of Falls [x] Yes  [] No 25  0 25   Secondary Diagnosis [] Yes  [x] No 15  0 0   Ambulatory Aid [] Furniture  [] Crutches/cane/walker  [x] None/bedrest/wheelchair/nurse 30  15  0 0   IV/Heparin Lock [] Yes  [x] No 20  0 0   Gait/Transferring [] Impaired  [] Weak  [x] Normal/bedrest/immobile 20  10  0 0   Mental Status [] Forgets limitations  [x] Oriented to own ability 15  0 0      Total: 25     Based on the Assessment score: check the appropriate box.   []  No intervention needed   Low =   Score of 0-24  [x]  Use standard prevention interventions Moderate =  Score of 24-44   [x] Discuss fall prevention strategies   [x] Indicate moderate falls risk on eval  []  Use high risk prevention interventions High = Score of 45 and higher   [] Discuss fall prevention strategies   [] Provide supervision during treatment time      Minutes:  PT Individual Minutes  Time In: 1535  Time Out: 1616  Minutes: 41  Timed Code Treatment Minutes: 18 Minutes  Procedure Minutes: 23     Timed Activity Minutes Units   Ther Ex 10 1   Manual  8 0       Electronically signed by Shira Art PT on 12/5/22 at 5:08 PM EST

## 2022-12-12 ENCOUNTER — HOSPITAL ENCOUNTER (OUTPATIENT)
Dept: PHYSICAL THERAPY | Age: 47
Setting detail: THERAPIES SERIES
End: 2022-12-12
Payer: MEDICAID

## 2022-12-15 ENCOUNTER — HOSPITAL ENCOUNTER (OUTPATIENT)
Dept: PHYSICAL THERAPY | Age: 47
Setting detail: THERAPIES SERIES
Discharge: HOME | End: 2022-12-15
Payer: MEDICAID

## 2022-12-15 PROCEDURE — 97035 APP MDLTY 1+ULTRASOUND EA 15: CPT

## 2022-12-15 PROCEDURE — 97110 THERAPEUTIC EXERCISES: CPT

## 2022-12-15 ASSESSMENT — PAIN DESCRIPTION - LOCATION: LOCATION: SHOULDER

## 2022-12-15 ASSESSMENT — PAIN SCALES - GENERAL: PAINLEVEL_OUTOF10: 4

## 2022-12-15 ASSESSMENT — PAIN DESCRIPTION - DESCRIPTORS: DESCRIPTORS: ACHING

## 2022-12-15 ASSESSMENT — PAIN DESCRIPTION - ORIENTATION: ORIENTATION: LEFT

## 2022-12-15 NOTE — PROGRESS NOTES
[address] [facility] [address] and Therapy  Outpatient Physical Therapy    Treatment Note        Date: 12/15/2022  Patient: Cecilia Farrar  : 1975   Confirmed: Yes  MRN: [id number]  Referring Provider: Kiersten Evans MD   Secondary Referring Provider (If applicable): none   Medical Diagnosis: Dislocation of left elbow, subsequent encounter [T95.032G]    Treatment Diagnosis: decreased L elbow ROM, decreased L UE strength, decreased L hand sensation, decreased posture, and decreased activity tolerance and increased pain L elbow with normalized motion during ADLs    Visit Information:  Insurance: Payor: [employer] / Plan: Ivon Route / Product Type: *No Product type* /   PT Visit Information  Onset Date:  (2022)  PT Insurance Information: [employer]  Total # of Visits Approved: 30 (per ronel yr- per discipline)  Total # of Visits to Date: 8  No Show: 0  Canceled Appointment: 0  Progress Note Counter: 7/10  Referring Provider (secondary): none    Subjective Information:  Subjective: \"I don't know what I'm supposed to be doing, I thought I was told not to raise my arm, all I've been doing is icing my arm\" Reports occ nerve pain in Lt thumb, burning sensation.   HEP Compliance:  [x] Good [] Fair [] Poor [] Reports not doing due to:    Pain Screening  Patient Currently in Pain: Yes  Pain Level: 4  Pain Location: Shoulder  Pain Orientation: Left  Pain Descriptors: Aching    Treatment:  Exercises:  Exercises  Exercise 13: Pulleys 4' flexion  Exercise 19: Table slides (flex and ABD) 5\"x10 ea       Manual:   Manual Therapy  Other: Lt shldr PROM all planes 5'       Modalities:  Ultrasound (CPT 03709)  Patient Position: Seated  Ultrasound location: Shoulder  Ultrasound specified location: Lt UT  Ultrasound frequency: 3 MHz  Ultrasound intensity (W/cm2): 1.2  Ultrasound mode: Continuous  Ultrasound Parameters: 6' with 2 min set up  Post treatment skin assessment: Intact  Limitations addressed: Pain modulation, Tissue extensibility       *Indicates exercise, modality, or manual techniques to be initiated when appropriate            Assessment: Body Structures, Functions, Activity Limitations Requiring Skilled Therapeutic Intervention: Decreased functional mobility , Decreased ROM, Decreased strength, Decreased sensation, Decreased endurance, Increased pain, Decreased posture  Assessment: Initiated shoulder AROM exercises such as pulleys as well as cervical stretching to decrease tightness. Trialed US to Lt UE to decrease pain. Patient reporting relief with manual, decrease in pain at end of session. Discussed with patient using Lt UE as able and to keep within comfortable range. 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 Treatment Diagnosis: decreased L elbow ROM, decreased L UE strength, decreased L hand sensation, decreased posture, and decreased activity tolerance and increased pain L elbow with normalized motion during ADLs  Therapy Prognosis: Good          Post-Pain Assessment:       Pain Rating (0-10 pain scale):  0 /10   Location and pain description same as pre-treatment unless indicated. Action: [x] NA   [] Perform HEP  [] Meds as prescribed  [] Modalities as prescribed   [] Call Physician     GOALS   Patient Goal(s): Patient Goals : \"full ROM and full use of arm\"    Short Term Goals Completed by 5 wks Goal Status   STG 1 Pt will demonstrate improved L shoulder and elbow AROM (within MD protocol) for return to full functional use of L UE. In progress   STG 2 Pt will demonstrate improved L UE strength (assessed after 6 weeks post injury) >/= 3+/5 for return to assisting pt's father with dressing and bathing. In progress     Long Term Goals Completed by 5 wks Goal Status   LTG 1 Pt will demonstrate indep and 100% compliance with HEP for self managent of symptoms.  In progress   LTG 2 Pt will demonstrate improved score on UEFS >/= 30/80 for improved quality of life. In progress   LTG 3 Pt will demonstrate decreased L elbow pain with P/AROM </= 2/10 for return to full functional use of L UE. In progress   LTG 4 Pt will demonstrate improved Lt shoulder AROM WFL to complete ADLs In progress       Plan:  Frequency/Duration:  Plan  Plan Frequency: 1-2 xs/wk  Plan weeks: 10  Specific Instructions for Next Treatment: inc elbow ext by 10 deg ea week  Current Treatment Recommendations: Strengthening, ROM, Manual, Home exercise program, Safety education & training, Patient/Caregiver education & training, Equipment evaluation, education, & procurement, Modalities, Dry needling  Modalities: Ultrasound, E-stim - unattended  Pt to continue current HEP. See objective section for any therapeutic exercise changes, additions or modifications this date.     Therapy Time:      PT Individual Minutes  Time In: 0800  Time Out: 0845  Minutes: 45  Timed Code Treatment Minutes: 45 Minutes  Procedure Minutes:0  Timed Activity Minutes Units   Ther Ex 37 2   US 8 1     Electronically signed by Tammy Hendricks PTA on 12/15/22 at 8:55 AM EST

## 2022-12-19 ENCOUNTER — APPOINTMENT (OUTPATIENT)
Dept: PHYSICAL THERAPY | Age: 47
End: 2022-12-19
Payer: MEDICAID

## 2022-12-21 ENCOUNTER — HOSPITAL ENCOUNTER (OUTPATIENT)
Dept: PHYSICAL THERAPY | Age: 47
Setting detail: THERAPIES SERIES
Discharge: HOME | End: 2022-12-21
Payer: MEDICAID

## 2022-12-21 PROCEDURE — 97140 MANUAL THERAPY 1/> REGIONS: CPT

## 2022-12-21 PROCEDURE — G0283 ELEC STIM OTHER THAN WOUND: HCPCS

## 2022-12-21 PROCEDURE — 97110 THERAPEUTIC EXERCISES: CPT

## 2022-12-21 ASSESSMENT — PAIN DESCRIPTION - FREQUENCY: FREQUENCY: CONTINUOUS

## 2022-12-21 ASSESSMENT — PAIN DESCRIPTION - DESCRIPTORS: DESCRIPTORS: ACHING

## 2022-12-21 ASSESSMENT — PAIN DESCRIPTION - ORIENTATION: ORIENTATION: LEFT

## 2022-12-21 ASSESSMENT — PAIN DESCRIPTION - PAIN TYPE: TYPE: ACUTE PAIN

## 2022-12-21 ASSESSMENT — PAIN SCALES - GENERAL: PAINLEVEL_OUTOF10: 3

## 2022-12-21 ASSESSMENT — PAIN DESCRIPTION - LOCATION: LOCATION: SHOULDER

## 2022-12-21 NOTE — PROGRESS NOTES
[facility]  Outpatient Physical Therapy    Treatment Note        Date: 2022  Patient: Chuck Yost  : 1975   Confirmed: Yes  MRN: [id number]  Referring Provider: Evelio Francisco   Medical Diagnosis: Dislocation of left elbow, subsequent encounter [S53.105D]       Treatment Diagnosis: decreased L elbow ROM, decreased L UE strength, decreased L hand sensation, decreased posture, and decreased activity tolerance and increased pain L elbow with normalized motion during ADLs    Visit Information:  Insurance: Payor: Luis F Pack / Plan: Gerrianne Cranker / Product Type: *No Product type* /   PT Visit Information  Onset Date:  (2022)  PT Insurance Information: [employer]  Total # of Visits Approved: 30 (per ronel yr- per discipline)  Total # of Visits to Date: 8  No Show: 0  Canceled Appointment: 0  Progress Note Counter: 7/10  Referring Provider (secondary): none    Subjective Information:  Subjective: Pt states \"I felt ok for about an hour after last appt but then I started getting severe cramping My pain is a 3 right now but it's about a 7 when I first wake up. \"  HEP Compliance:  [x] Good [] Fair [] Poor [] Reports not doing due to:    Pain Screening  Patient Currently in Pain: Yes  Pain Level: 3  Pain Type: Acute pain  Pain Location: Shoulder  Pain Orientation: Left  Pain Descriptors: Aching  Pain Frequency: Continuous    Treatment:  Exercises:  Exercises  Exercise 12: cane exs: standing flex/ext, scaption, IR/ER 5\"x10  Exercise 13: Pulleys 3' flexion- inc pain w/ OH  Exercise 14: Gentle ER stretch at table 5\"x10  Exercise 19: Table slides (flex and ABD) 5\"x10 ea  Exercise 20: HEP: wand exs, table ER stretch     Manual:   Manual Therapy  Manual Traction: Gentle shldr distraction- inc relief  Soft Tissue Mobilizaton: STM to ant shldr, UT  Other: Lt shldr PROM all planes 5'     Modalities:  Cryotherapy (CPT 17580)  Patient Position: protocol) for return to full functional use of L UE. In progress   STG 2 Pt will demonstrate improved L UE strength (assessed after 6 weeks post injury) >/= 3+/5 for return to assisting pt's father with dressing and bathing. In progress     Long Term Goals Completed by 5 wks Goal Status   LTG 1 Pt will demonstrate indep and 100% compliance with HEP for self managent of symptoms. In progress   LTG 2 Pt will demonstrate improved score on UEFS >/= 30/80 for improved quality of life. In progress   LTG 3 Pt will demonstrate decreased L elbow pain with P/AROM </= 2/10 for return to full functional use of L UE. In progress   LTG 4 Pt will demonstrate improved Lt shoulder AROM WFL to complete ADLs In progress     Plan:  Frequency/Duration:  Plan  Plan Frequency: 1-2 xs/wk  Plan weeks: 10  Current Treatment Recommendations: Strengthening, ROM, Manual, Home exercise program, Safety education & training, Patient/Caregiver education & training, Equipment evaluation, education, & procurement, Modalities, Dry needling  Modalities: Ultrasound, E-stim - unattended  Pt to continue current HEP. See objective section for any therapeutic exercise changes, additions or modifications this date.     Therapy Time:      PT Individual Minutes  Time In: 4387  Time Out: 8288  Minutes: 51  Timed Code Treatment Minutes: 41 Minutes  Procedure Minutes: 10 min (ES/CP)   Timed Activity Minutes Units   Ther Ex 31 2   Manual  10 1     Electronically signed by Love Cee PTA on 12/21/22 at 10:45 AM EST

## 2022-12-28 ENCOUNTER — HOSPITAL ENCOUNTER (OUTPATIENT)
Dept: PHYSICAL THERAPY | Age: 47
Setting detail: THERAPIES SERIES
Discharge: HOME | End: 2022-12-28
Payer: MEDICAID

## 2022-12-28 PROCEDURE — 97035 APP MDLTY 1+ULTRASOUND EA 15: CPT

## 2022-12-28 PROCEDURE — 97110 THERAPEUTIC EXERCISES: CPT

## 2022-12-28 ASSESSMENT — PAIN DESCRIPTION - ORIENTATION: ORIENTATION: LEFT

## 2022-12-28 ASSESSMENT — PAIN DESCRIPTION - DESCRIPTORS: DESCRIPTORS: ACHING

## 2022-12-28 ASSESSMENT — PAIN DESCRIPTION - FREQUENCY: FREQUENCY: CONTINUOUS

## 2022-12-28 ASSESSMENT — PAIN SCALES - GENERAL: PAINLEVEL_OUTOF10: 4

## 2022-12-28 ASSESSMENT — PAIN DESCRIPTION - PAIN TYPE: TYPE: ACUTE PAIN

## 2022-12-28 ASSESSMENT — PAIN DESCRIPTION - LOCATION: LOCATION: SHOULDER

## 2022-12-28 NOTE — PROGRESS NOTES
[address] [facility] [address] [facility]  Outpatient Physical Therapy    Treatment Note        Date: 2022  Patient: Hector Clark  : 1975   Confirmed: Yes  MRN: [id number]  Referring Provider: Ugo Mccormick MD   Secondary Referring Provider (If applicable): none   Medical Diagnosis: Dislocation of left elbow, subsequent encounter [U05.901K]    Treatment Diagnosis: decreased L elbow ROM, decreased L UE strength, decreased L hand sensation, decreased posture, and decreased activity tolerance and increased pain L elbow with normalized motion during ADLs    Visit Information:  Insurance: Payor: [employer] / Plan: Ani Pouch / Product Type: *No Product type* /   PT Visit Information  Onset Date:  (2022)  PT Insurance Information: [employer]  Total # of Visits Approved: 30 (per ronel yr- per discipline)  Total # of Visits to Date: 10  No Show: 0  Canceled Appointment: 0  Progress Note Counter: 10/10  Referring Provider (secondary): none    Subjective Information:  Subjective: Pt states \"The shoulder is about the same. It still hurts pretty bad when I wake up but whatever you did the last time really helped. \"  HEP Compliance:  [x] Good [] Fair [] Poor [] Reports not doing due to:    Pain Screening  Patient Currently in Pain: Yes  Pain Assessment: 0-10  Pain Level: 4  Best Pain Level: 3  Worst Pain Level: 6 (6-7; in AM and w/ over head reaching)  Pain Type: Acute pain  Pain Location: Shoulder  Pain Orientation: Left  Pain Descriptors: Aching  Pain Frequency: Continuous    Treatment:  Exercises:  Exercises  Exercise 11: * prone scap  Exercise 12: cane exs: standing flex/ext, scaption, IR/ER 5\"x10  Exercise 13: Pulleys 3' flexion- inc pain w/ OH  Exercise 14: Gentle ER stretch at table 5\"x10  Exercise 15: * UBE  Exercise 16: Shoulder isos 5\"x10 ea  Exercise 20: HEP: shldr isos     Manual:   Manual Therapy  Manual Traction: Gentle shldr distraction- inc relief  Soft Tissue Mobilizaton: STM to ant shldr, UT  Other: Lt shldr PROM all planes 7'     Modalities:  Ultrasound (CPT 70197)  Patient Position: Seated  Ultrasound location: Shoulder  Ultrasound specified location: Ant GHJ  Ultrasound frequency: 3 MHz  Ultrasound intensity (W/cm2): 1.2  Ultrasound mode: Continuous  Ultrasound Parameters: 6' with 2 min set up  Post treatment skin assessment: Intact  Limitations addressed: Pain modulation, Tissue extensibility     *Indicates exercise, modality, or manual techniques to be initiated when appropriate    Objective Measures:      Strength: [x] NT  [] MMT completed:     ROM: [] NT  [x] ROM measurements:  AROM LUE (degrees)  L Shoulder Flexion (0-180): 160  L Shoulder ABduction (0-180): 160  L Shoulder Int Rotation  (0-70): T8  L Shoulder Ext Rotation  (0-90): 52 deg       Assessment: Body Structures, Functions, Activity Limitations Requiring Skilled Therapeutic Intervention: Decreased functional mobility , Decreased ROM, Decreased strength, Decreased sensation, Decreased endurance, Increased pain, Decreased posture  Assessment: Pt w/ ongoing Lt shldr pain ranging from 3-7/10 at most however, ROM noted to be gradually improving to 75% of Rt UE (uninvolved) in all planes upon assessment. Pt describes \"deep catching\" in ant GHJ at 90 deg flex/ABD w/ AROM. Introduced pt to gentle isometric shldr strengthening to promote inc Lt UE function within shekhar. Pt completes without inc pain. Reinitiated US to ant GHJ vs UT region to target \"pinpoint\" pain; will cont to assess response. Pt wishing to cont PT until expected to F/U w/ orthopedic on 1/16/22, MRI to be considering at this time according to MD note.   Treatment Diagnosis: decreased L elbow ROM, decreased L UE strength, decreased L hand sensation, decreased posture, and decreased activity tolerance and increased pain L elbow with normalized motion during ADLs  Therapy Prognosis: Good     Post-Pain Assessment: Pain Rating (0-10 pain scale):   3/10   Location and pain description same as pre-treatment unless indicated. Action: [] NA   [x] Perform HEP  [] Meds as prescribed  [x] Modalities as prescribed   [] Call Physician     GOALS   Patient Goal(s): Patient Goals : \"full ROM and full use of arm\"    Short Term Goals Completed by 5 wks Goal Status   STG 1 Pt will demonstrate improved L shoulder and elbow AROM (within MD protocol) for return to full functional use of L UE. In progress   STG 2 Pt will demonstrate improved L UE strength (assessed after 6 weeks post injury) >/= 3+/5 for return to assisting pt's father with dressing and bathing. In progress     Long Term Goals Completed by 5 wks Goal Status   LTG 1 Pt will demonstrate indep and 100% compliance with HEP for self managent of symptoms. In progress   LTG 2 Pt will demonstrate improved score on UEFS >/= 30/80 for improved quality of life. In progress   LTG 3 Pt will demonstrate decreased L elbow pain with P/AROM </= 2/10 for return to full functional use of L UE. In progress   LTG 4 Pt will demonstrate improved Lt shoulder AROM WFL to complete ADLs In progress     Plan:  Frequency/Duration:  Plan  Plan Frequency: 1-2 xs/wk  Plan weeks: 10  Current Treatment Recommendations: Strengthening, ROM, Manual, Home exercise program, Safety education & training, Patient/Caregiver education & training, Equipment evaluation, education, & procurement, Modalities, Dry needling  Modalities: Ultrasound, E-stim - unattended  Pt to continue current HEP. See objective section for any therapeutic exercise changes, additions or modifications this date.     Therapy Time:      PT Individual Minutes  Time In: 0800  Time Out: 4321  Minutes: 50  Timed Code Treatment Minutes: 50 Minutes  Procedure Minutes: N/A   Timed Activity Minutes Units   Ther Ex 35 2   US 8 1   Manual  7 0     Electronically signed by Jn Estrella PTA on 12/28/22 at 8:57 AM EST

## 2023-01-02 NOTE — PLAN OF CARE
[facility]  PHYSICAL THERAPY PLAN OF CARE   [facility]      [address], [address] [facility], [address]     Ph: [phone] Fax: [phone]      [] Certification  [] Recertification [x]  Plan of Care  [] Progress Note [] Discharge      Referring Provider: Shirley Iyer MD  Referring Provider (secondary): none   From:  [address]  Patient: Corbin Salazar (49 y.o. female) : 1975 Date: 2022   Medical Diagnosis: Dislocation of left elbow, subsequent encounter [S53.105D]    Treatment Diagnosis: decreased L elbow ROM, decreased L UE strength, decreased L hand sensation, decreased posture, and decreased activity tolerance and increased pain L elbow with normalized motion during ADLs    Progress Report Period from:  22 to 2023    Visits to Date: 10 No Show: 0 Cancelled Appts: 0    OBJECTIVE:   Short Term Goals - Time Frame for Short Term Goals: 5 wks    Goals Current/Discharge status  Status   Short Term Goal 1: Pt will demonstrate improved L shoulder and elbow AROM (within MD protocol) for return to full functional use of L UE.   AROM LUE (degrees)  L Shoulder Flexion (0-180): 160  L Shoulder ABduction (0-180): 160  L Shoulder Int Rotation  (0-70): T8  L Shoulder Ext Rotation  (0-90): 52 deg    In progress   Short Term Goal 2: Pt will demonstrate improved L UE strength (assessed after 6 weeks post injury) >/= 3+/5 for return to assisting pt's father with dressing and bathing. NT this date In progress     Long Term Goals - Time Frame for Long Term Goals : 5 wks  Goals Current/ Discharge status Status   Long Term Goal 1: Pt will demonstrate indep and 100% compliance with HEP for self managent of symptoms. Pt has been issued HEP and is independent. Reports good compliance. In progress   Long Term Goal 2: Pt will demonstrate improved score on UEFS >/= 30/80 for improved quality of life.  NT this date In progress   Long Term Goal 3: Pt will demonstrate decreased L elbow pain with P/AROM </= 2/10 for return to full functional use of L UE. Elbow pain WFL. Pt reports shoulder pain 3-7/10 In progress   Long Term Goal 4: Pt will demonstrate improved Lt shoulder AROM WFL to complete ADLs  AROM LUE (degrees)  L Shoulder Flexion (0-180): 160  L Shoulder ABduction (0-180): 160  L Shoulder Int Rotation  (0-70): T8  L Shoulder Ext Rotation  (0-90): 52 deg    In progress     Body Structures, Functions, Activity Limitations Requiring Skilled Therapeutic Intervention: Decreased functional mobility , Decreased ROM, Decreased strength, Decreased sensation, Decreased endurance, Increased pain, Decreased posture  Assessment: Pt w/ ongoing Lt shldr pain ranging from 3-7/10 at most however, ROM noted to be gradually improving to 75% of Rt UE (uninvolved) in all planes upon assessment. Pt describes \"deep catching\" in ant GHJ at 90 deg flex/ABD w/ AROM. Introduced pt to gentle isometric shldr strengthening to promote inc Lt UE function within shekhar. Pt completes without inc pain. Reinitiated US to ant GHJ vs UT region to target \"pinpoint\" pain; will cont to assess response. Pt wishing to cont PT until expected to F/U w/ orthopedic on 1/16/22, MRI to be considering at this time according to MD note. Therapy Prognosis: Good           PLAN: [] Evaluate and Treat  Frequency/Duration:  Plan Frequency: 1-2 xs/wk  Plan weeks: 3-4  Current Treatment Recommendations: Strengthening, ROM, Manual, Home exercise program, Safety education & training, Patient/Caregiver education & training, Equipment evaluation, education, & procurement, Modalities, Dry needling  Modalities: Ultrasound, E-stim - unattended        Patient Status:[x] Continue/ Initiate plan of Care    [] Discharge PT. Recommend pt continue with HEP.      [] Additional visits requested, Please re-certify for additional visits:    [] Hold         Signature: Electronically signed by Tavares Hobbs PT on 1/2/23 at 9:49 AM EST    If you have any questions or concerns, please don't hesitate to call. Thank you for your referral.    I have reviewed this plan of care and certify a need for medically necessary rehabilitation services.     Physician Signature:__________________________________________________________  Date:  Please sign and return

## 2023-01-04 ENCOUNTER — APPOINTMENT (OUTPATIENT)
Dept: PHYSICAL THERAPY | Age: 48
End: 2023-01-04
Payer: MEDICAID

## 2023-01-10 ENCOUNTER — HOSPITAL ENCOUNTER (OUTPATIENT)
Dept: PHYSICAL THERAPY | Age: 48
Setting detail: THERAPIES SERIES
Discharge: HOME | End: 2023-01-10
Payer: MEDICAID

## 2023-01-10 PROCEDURE — 97140 MANUAL THERAPY 1/> REGIONS: CPT

## 2023-01-10 PROCEDURE — 97110 THERAPEUTIC EXERCISES: CPT

## 2023-01-10 ASSESSMENT — PAIN DESCRIPTION - DESCRIPTORS: DESCRIPTORS: ACHING

## 2023-01-10 ASSESSMENT — PAIN SCALES - GENERAL: PAINLEVEL_OUTOF10: 3

## 2023-01-10 ASSESSMENT — PAIN DESCRIPTION - FREQUENCY: FREQUENCY: CONTINUOUS

## 2023-01-10 ASSESSMENT — PAIN DESCRIPTION - ORIENTATION: ORIENTATION: LEFT

## 2023-01-10 ASSESSMENT — PAIN DESCRIPTION - LOCATION: LOCATION: SHOULDER

## 2023-01-10 NOTE — PROGRESS NOTES
[address] [facility]  Outpatient Physical Therapy    Treatment Note        Date: 1/10/2023  Patient: Jearldine Peabody  : 1975   Confirmed: Yes  MRN: [id number]  Referring Provider: Tucker Bal MD   Secondary Referring Provider (If applicable): none   Medical Diagnosis: Dislocation of left elbow, subsequent encounter [L86.157G]    Treatment Diagnosis: decreased L elbow ROM, decreased L UE strength, decreased L hand sensation, decreased posture, and decreased activity tolerance and increased pain L elbow with normalized motion during ADLs    Visit Information:  Insurance: Payor: Kimberly Osman / Plan: Josph Nine / Product Type: *No Product type* /   PT Visit Information  Onset Date:  (2022)  PT Insurance Information: [employer]  Total # of Visits Approved: 30 (per ronel yr- per discipline)  Total # of Visits to Date: 11  No Show: 0  Canceled Appointment: 0  Progress Note Counter:   Referring Provider (secondary): none    Subjective Information:  Subjective: Pt reports ortho appointments were cancelled, pt trying to decide if she would like to get injection. HEP Compliance:  [x] Good [] Fair [] Poor [] Reports not doing due to:    Pain Screening  Patient Currently in Pain: Yes  Pain Assessment: 0-10  Pain Level: 3  Pain Location: Shoulder  Pain Orientation: Left  Pain Descriptors: Aching  Pain Frequency: Continuous    Treatment:  Exercises:  Exercises  Exercise 11: * prone scap  Exercise 12: cane exs: standing flex/ext, scaption, IR/ER 5\"x10  Exercise 13: Pulleys 3' flexion- denies pain  Exercise 14: Gentle ER stretch at table 5\"x10  Exercise 15: UBE L1.0 x4 min (denies pain)  Exercise 19: rows and lats YTB x10  Exercise 20: HEP: shldr isos       Manual:   Manual Therapy  Manual Traction: Gentle shldr distraction- inc relief  Other: Lt shldr PROM all planes 7'      Assessment:    Body Structures, Functions, Activity Limitations Requiring Skilled Therapeutic Intervention: Decreased functional mobility , Decreased ROM, Decreased strength, Decreased sensation, Decreased endurance, Increased pain, Decreased posture  Assessment: Time spent this session educating patient on body mechanics and technique to reduce stress on left shoulder when caring for elderly parent. Pt demonstrates good understanding. Addition of rows and lats this date with YTB to increase strength needed as caregiver. US held this date per pt request to focus on manual therapy. Pt denies any increase in pain with exercises and reports \"that felt good\" after manual therapy. Pt declines CP post treatment stating she will apply at home. Pt to call back after MRI to decide next course of action. Treatment Diagnosis: decreased L elbow ROM, decreased L UE strength, decreased L hand sensation, decreased posture, and decreased activity tolerance and increased pain L elbow with normalized motion during ADLs  Therapy Prognosis: Good     Post-Pain Assessment:       Pain Rating (0-10 pain scale):   3/10   Location and pain description same as pre-treatment unless indicated. Action: [] NA   [x] Perform HEP  [] Meds as prescribed  [] Modalities as prescribed   [] Call Physician     GOALS   Patient Goal(s): Patient Goals : \"full ROM and full use of arm\"    Short Term Goals Completed by 5 wks Goal Status   STG 1 Pt will demonstrate improved L shoulder and elbow AROM (within MD protocol) for return to full functional use of L UE. In progress   STG 2 Pt will demonstrate improved L UE strength (assessed after 6 weeks post injury) >/= 3+/5 for return to assisting pt's father with dressing and bathing. In progress       Long Term Goals Completed by 5 wks Goal Status   LTG 1 Pt will demonstrate indep and 100% compliance with HEP for self managent of symptoms. In progress   LTG 2 Pt will demonstrate improved score on UEFS >/= 30/80 for improved quality of life.  In progress   LTG 3 Pt will demonstrate decreased L elbow pain with P/AROM </= 2/10 for return to full functional use of L UE. In progress   LTG 4 Pt will demonstrate improved Lt shoulder AROM WFL to complete ADLs In progress            Plan:  Frequency/Duration:  Plan  Plan Frequency: 1-2 xs/wk  Plan weeks: 3-4  Current Treatment Recommendations: Strengthening, ROM, Manual, Home exercise program, Safety education & training, Patient/Caregiver education & training, Equipment evaluation, education, & procurement, Modalities, Dry needling  Modalities: Ultrasound, E-stim - unattended  Additional Comments: pt to call and schedule additional appointments after MRI  Pt to continue current HEP. See objective section for any therapeutic exercise changes, additions or modifications this date.     Therapy Time:      PT Individual Minutes  Time In: 7284  Time Out: 1441  Minutes: 45  Timed Code Treatment Minutes: 45 Minutes  Procedure Minutes: 0  Timed Activity Minutes Units   Ther Ex 37 2   Manual  8 1     Electronically signed by Kimmy Curry PTA on 1/10/23 at 9:45 AM EST

## 2023-01-16 ENCOUNTER — TELEMEDICINE (OUTPATIENT)
Dept: FAMILY MEDICINE CLINIC | Age: 48
End: 2023-01-16
Payer: MEDICAID

## 2023-01-16 DIAGNOSIS — G89.29 CHRONIC LEFT SHOULDER PAIN: ICD-10-CM

## 2023-01-16 DIAGNOSIS — I10 PRIMARY HYPERTENSION: ICD-10-CM

## 2023-01-16 DIAGNOSIS — Z12.31 SCREENING MAMMOGRAM FOR BREAST CANCER: ICD-10-CM

## 2023-01-16 DIAGNOSIS — F31.81 BIPOLAR II DISORDER (HCC): Primary | ICD-10-CM

## 2023-01-16 DIAGNOSIS — E78.2 MIXED HYPERLIPIDEMIA: ICD-10-CM

## 2023-01-16 DIAGNOSIS — M25.512 CHRONIC LEFT SHOULDER PAIN: ICD-10-CM

## 2023-01-16 PROCEDURE — 4004F PT TOBACCO SCREEN RCVD TLK: CPT | Performed by: NURSE PRACTITIONER

## 2023-01-16 PROCEDURE — G8484 FLU IMMUNIZE NO ADMIN: HCPCS | Performed by: NURSE PRACTITIONER

## 2023-01-16 PROCEDURE — G8427 DOCREV CUR MEDS BY ELIG CLIN: HCPCS | Performed by: NURSE PRACTITIONER

## 2023-01-16 PROCEDURE — G8419 CALC BMI OUT NRM PARAM NOF/U: HCPCS | Performed by: NURSE PRACTITIONER

## 2023-01-16 PROCEDURE — 99214 OFFICE O/P EST MOD 30 MIN: CPT | Performed by: NURSE PRACTITIONER

## 2023-01-16 RX ORDER — TOPIRAMATE 50 MG/1
50 TABLET, FILM COATED ORAL 2 TIMES DAILY
Qty: 60 TABLET | Refills: 5 | Status: SHIPPED | OUTPATIENT
Start: 2023-01-16

## 2023-01-16 RX ORDER — LOSARTAN POTASSIUM 100 MG/1
100 TABLET ORAL DAILY
Qty: 30 TABLET | Refills: 5 | Status: SHIPPED | OUTPATIENT
Start: 2023-01-16 | End: 2023-01-19

## 2023-01-16 ASSESSMENT — ENCOUNTER SYMPTOMS
COLOR CHANGE: 0
ANAL BLEEDING: 0
ABDOMINAL PAIN: 0
RECTAL PAIN: 0
BLOOD IN STOOL: 0
ALLERGIC/IMMUNOLOGIC NEGATIVE: 1
RESPIRATORY NEGATIVE: 1
CONSTIPATION: 0
GASTROINTESTINAL NEGATIVE: 1
VOICE CHANGE: 0
TROUBLE SWALLOWING: 0
DIARRHEA: 0
SHORTNESS OF BREATH: 0
EYES NEGATIVE: 1

## 2023-01-16 ASSESSMENT — PATIENT HEALTH QUESTIONNAIRE - PHQ9
3. TROUBLE FALLING OR STAYING ASLEEP: 1
10. IF YOU CHECKED OFF ANY PROBLEMS, HOW DIFFICULT HAVE THESE PROBLEMS MADE IT FOR YOU TO DO YOUR WORK, TAKE CARE OF THINGS AT HOME, OR GET ALONG WITH OTHER PEOPLE: 0
6. FEELING BAD ABOUT YOURSELF - OR THAT YOU ARE A FAILURE OR HAVE LET YOURSELF OR YOUR FAMILY DOWN: 0
8. MOVING OR SPEAKING SO SLOWLY THAT OTHER PEOPLE COULD HAVE NOTICED. OR THE OPPOSITE, BEING SO FIGETY OR RESTLESS THAT YOU HAVE BEEN MOVING AROUND A LOT MORE THAN USUAL: 0
SUM OF ALL RESPONSES TO PHQ QUESTIONS 1-9: 4
SUM OF ALL RESPONSES TO PHQ QUESTIONS 1-9: 4
4. FEELING TIRED OR HAVING LITTLE ENERGY: 0
SUM OF ALL RESPONSES TO PHQ QUESTIONS 1-9: 4
9. THOUGHTS THAT YOU WOULD BE BETTER OFF DEAD, OR OF HURTING YOURSELF: 0
5. POOR APPETITE OR OVEREATING: 0
1. LITTLE INTEREST OR PLEASURE IN DOING THINGS: 0
SUM OF ALL RESPONSES TO PHQ QUESTIONS 1-9: 4
7. TROUBLE CONCENTRATING ON THINGS, SUCH AS READING THE NEWSPAPER OR WATCHING TELEVISION: 0
2. FEELING DOWN, DEPRESSED OR HOPELESS: 3
SUM OF ALL RESPONSES TO PHQ9 QUESTIONS 1 & 2: 3

## 2023-01-16 NOTE — PROGRESS NOTES
Yinka Lazo (:  1975) is a Established patient, here for evaluation of the following:    Assessment & Plan   Below is the assessment and plan developed based on review of pertinent history, physical exam, labs, studies, and medications. 1. Bipolar II disorder ([facility] [address]  2. Primary hypertension  -     Lipid Panel; Future  -     Comprehensive Metabolic Panel; Future  -     CBC with Auto Differential; Future  -     losartan (COZAAR) 100 MG tablet; Take 1 tablet by mouth daily, Disp-30 tablet, R-5Normal  3. Mixed hyperlipidemia  -     Lipid Panel; Future  -     Comprehensive Metabolic Panel; Future  -     CBC with Auto Differential; Future  4. Chronic left shoulder pain  5. Screening mammogram for breast cancer  -     [facility] DIGITAL SCREEN W OR WO CAD BILATERAL; Future    No follow-ups on file. Subjective   Hypertension  Pertinent negatives include no chest pain, headaches, palpitations or shortness of breath. Hypertension  This is a new problem. The current episode started more than 1 month ago. The problem has been gradually improving since onset. Associated symptoms include anxiety. Pertinent negatives include no blurred vision, chest pain, headaches, malaise/fatigue, neck pain, orthopnea, palpitations, peripheral edema, PND, shortness of breath or sweats. Past treatments include ACE inhibitors. The current treatment provides moderate improvement. There are no compliance problems. Hyperlipidemia  Pertinent negatives include no chest pain, leg pain or shortness of breath. Shoulder- pain getting injection   Bipolar-  controlled wants to try topiramate    Review of Systems   Constitutional: Negative. Negative for activity change, appetite change, fatigue and unexpected weight change. HENT: Negative. Negative for dental problem, nosebleeds, trouble swallowing and voice change. Eyes: Negative. Negative for visual disturbance. Respiratory: Negative.   Negative for shortness of breath. Cardiovascular: Negative. Negative for chest pain, palpitations and leg swelling. Gastrointestinal: Negative. Negative for abdominal pain, anal bleeding, blood in stool, constipation, diarrhea and rectal pain. Endocrine: Negative. Negative for cold intolerance, heat intolerance, polydipsia, polyphagia and polyuria. Genitourinary: Negative. Musculoskeletal:  Positive for arthralgias. Skin: Negative. Negative for color change and rash. Allergic/Immunologic: Negative. Neurological: Negative. Negative for dizziness, syncope, weakness and headaches. Hematological: Negative. Negative for adenopathy. Does not bruise/bleed easily. Psychiatric/Behavioral:  Negative for dysphoric mood and sleep disturbance. The patient is nervous/anxious.          Objective   Patient-Reported Vitals  No data recorded     Physical Exam  [INSTRUCTIONS:  \"[x]\" Indicates a positive item  \"[]\" Indicates a negative item  -- DELETE ALL ITEMS NOT EXAMINED]    Constitutional: [x] Appears well-developed and well-nourished [x] No apparent distress      [] Abnormal -     Mental status: [x] Alert and awake  [x] Oriented to person/place/time [x] Able to follow commands    [] Abnormal -     Eyes:   EOM    [x]  Normal    [] Abnormal -   Sclera  [x]  Normal    [] Abnormal -          Discharge [x]  None visible   [] Abnormal -     HENT: [x] Normocephalic, atraumatic  [] Abnormal -   [x] Mouth/Throat: Mucous membranes are moist    External Ears [x] Normal  [] Abnormal -    Neck: [x] No visualized mass [] Abnormal -     Pulmonary/Chest: [x] Respiratory effort normal   [x] No visualized signs of difficulty breathing or respiratory distress        [] Abnormal -      Musculoskeletal:   [x] Normal gait with no signs of ataxia         [x] Normal range of motion of neck        [] Abnormal -     Neurological:        [x] No Facial Asymmetry (Cranial nerve 7 motor function) (limited exam due to video visit)          [x] No gaze palsy [] Abnormal -          Skin:        [x] No significant exanthematous lesions or discoloration noted on facial skin         [] Abnormal -            Psychiatric:       [x] Normal Affect [] Abnormal -        [x] No Hallucinations    Other pertinent observable physical exam findings:-         On this date 1/16/2023 I have spent 30 minutes reviewing previous notes, test results and face to face (virtual) with the patient discussing the diagnosis and importance of compliance with the treatment plan as well as documenting on the day of the visit. Amari Don, was evaluated through a synchronous (real-time) audio-video encounter. The patient (or guardian if applicable) is aware that this is a billable service, which includes applicable co-pays. This Virtual Visit was conducted with patient's (and/or legal guardian's) consent. The visit was conducted pursuant to the emergency declaration under the [address] [place] [address], [address] [place] [address] [facility] authority and the [employer] General Act. Patient identification was verified, and a caregiver was present when appropriate. The patient was located at Home: [address] [place] [address]. Provider was located at [facility] (Appt Dept): [address] [facility],  [address] [place] [address].         --Heather Estevez, NATASHA - CNP

## 2023-01-18 ENCOUNTER — NURSE TRIAGE (OUTPATIENT)
Dept: OTHER | Facility: CLINIC | Age: 48
End: 2023-01-18

## 2023-01-18 NOTE — TELEPHONE ENCOUNTER
Location of patient: [facility] Karlee Benitez call from [facility] at [employer] with [employer]. Subjective: Caller states \"I think I am reacting to my BP medication. \"     Current Symptoms: Shortness of breath, chest heaviness and edema since starting Lorsartan Potassium 100 mg. Hands and feet are swollen. Smoker. Was taken off of Lisinopril due to coughing caused by it. Onset: 1 day ago; improving    Associated Symptoms: NA    Pain Severity: 0/10; N/A; none    Temperature: denies fever     What has been tried: Inhaler-does help. Did not take her Losarten today. Took a Lisinopril 10 mg today instead. LMP: NA Pregnant: NA    Recommended disposition: Go to ED Now    Care advice provided, patient verbalizes understanding; denies any other questions or concerns; instructed to call back for any new or worsening symptoms. Tried to reach PCP for ER disposition refusal.  After on hold for 10 minutes, no staff picked up and phone disconnected. Patient aware will send message to PCP as a high priority and she will get a call back from them. Still highly recommend ER for evaluation. Attention Provider: Thank you for allowing me to participate in the care of your patient. The patient was connected to triage in response to information provided to the ECC/PSC. Please do not respond through this encounter as the response is not directed to a shared pool.     Reason for Disposition   [1] MODERATE difficulty breathing (e.g., speaks in phrases, SOB even at rest, pulse 100-120) AND [2] NEW-onset or WORSE than normal    Protocols used: Breathing Difficulty-ADULT-AH

## 2023-01-19 ENCOUNTER — TELEPHONE (OUTPATIENT)
Dept: FAMILY MEDICINE CLINIC | Age: 48
End: 2023-01-19

## 2023-01-19 ENCOUNTER — OFFICE VISIT (OUTPATIENT)
Dept: ORTHOPEDIC SURGERY | Age: 48
End: 2023-01-19
Payer: MEDICAID

## 2023-01-19 DIAGNOSIS — S43.402S SPRAIN OF LEFT SHOULDER, UNSPECIFIED SHOULDER SPRAIN TYPE, SEQUELA: Primary | ICD-10-CM

## 2023-01-19 PROCEDURE — G8427 DOCREV CUR MEDS BY ELIG CLIN: HCPCS | Performed by: PHYSICIAN ASSISTANT

## 2023-01-19 PROCEDURE — 4004F PT TOBACCO SCREEN RCVD TLK: CPT | Performed by: PHYSICIAN ASSISTANT

## 2023-01-19 PROCEDURE — G8484 FLU IMMUNIZE NO ADMIN: HCPCS | Performed by: PHYSICIAN ASSISTANT

## 2023-01-19 PROCEDURE — G8419 CALC BMI OUT NRM PARAM NOF/U: HCPCS | Performed by: PHYSICIAN ASSISTANT

## 2023-01-19 PROCEDURE — 99214 OFFICE O/P EST MOD 30 MIN: CPT | Performed by: PHYSICIAN ASSISTANT

## 2023-01-19 RX ORDER — AMLODIPINE BESYLATE 10 MG/1
10 TABLET ORAL DAILY
Qty: 30 TABLET | Refills: 3 | Status: SHIPPED | OUTPATIENT
Start: 2023-01-19

## 2023-01-19 NOTE — TELEPHONE ENCOUNTER
Patient thinks she reactive to her BP RX says inhaler doesn't help her chest heaviness. Patient requesting different Rx in place of LOSARTAN.  Patient asking for a call back [phone]      Please advise, thank you

## 2023-01-19 NOTE — PROGRESS NOTES
[facility]      Subjective:      Chief Complaint   Patient presents with    Follow-up     Follow up visit for Sprain of left shoulder, cortisone injection       HPI: Will Ureña is a 49 y.o. female who is here for a left shoulder follow-up after a left elbow dislocation and reduction by Dr. Mery Rich which was ~4 months ago. She has been dealing with a left shoulder injury ever since that date as well. She been working with therapy, trying anti-inflammatories not any relief of her symptoms. She is done with therapy however still doing her exercises at home. Still has significant difficulties with flexion of her arm especially when she works her dad is in hospice care at her house. She have a difficult time taking care of them. Past Medical History:   Diagnosis Date    Depression       Past Surgical History:   Procedure Laterality Date    BREAST ENHANCEMENT SURGERY       Social History     Socioeconomic History    Marital status: Single     Spouse name: Not on file    Number of children: Not on file    Years of education: Not on file    Highest education level: Not on file   Occupational History    Not on file   Tobacco Use    Smoking status: Every Day     Packs/day: 1.00     Types: Cigarettes    Smokeless tobacco: Never   Vaping Use    Vaping Use: Never used   Substance and Sexual Activity    Alcohol use:  Yes     Alcohol/week: 4.0 standard drinks     Types: 4 Glasses of wine per week    Drug use: No    Sexual activity: Not on file   Other Topics Concern    Not on file   Social History Narrative    Not on file     Social Determinants of Health     Financial Resource Strain: Low Risk     Difficulty of Paying Living Expenses: Not hard at all   Food Insecurity: No Food Insecurity    Worried About Running Out of Food in the Last Year: Never true    Ran Out of Food in the Last Year: Never true   Transportation Needs: Not on file   Physical Activity: Not on file   Stress: Not on file   Social Connections: Not on file   Intimate Partner Violence: Not on file   Housing Stability: Not on file     No family history on file. No Known Allergies  Current Outpatient Medications on File Prior to Visit   Medication Sig Dispense Refill    topiramate (TOPAMAX) 50 MG tablet Take 1 tablet by mouth 2 times daily 60 tablet 5    VENTOLIN  (90 Base) MCG/ACT inhaler inhale 2 puffs by mouth and INTO THE LUNGS every 6 hours if needed for wheezing 1 each 3    atorvastatin (LIPITOR) 10 MG tablet take 1 tablet by mouth once daily 30 tablet 5    ibuprofen (IBU) 600 MG tablet Take 1 tablet by mouth every 6 hours as needed for Pain 120 tablet 0     Current Facility-Administered Medications on File Prior to Visit   Medication Dose Route Frequency Provider Last Rate Last Admin    levonorgestrel (MIRENA) IUD 52 mg 1 each  1 each IntraUTERine Once Kell Turner, NATASHA - CNP   1 each at 07/13/20 0942       Objective: There were no vitals taken for this visit. Radiographs and Laboratory Studies:   Previous diagnostic imaging studies were reviewed. The left elbow he has full flexion. She has about 15 degrees off full extension. She has normal pronation supination. She has 4-5 strength with extension however 5 out of 5 strength with all of her motions. She has some mild pain around the olecranon / lateral condyle. Exam the left shoulder she has about 80% of her motion with flexion and abduction. She has normal internal rotation which alleviates her pain. She has full external rotation. Is about 4-5 strength per the contralateral side in the setting of pain. She has pain at the anterior portion of the glenohumeral joint.     Laboratory Studies:   Lab Results   Component Value Date    WBC 6.2 09/29/2022    HGB 12.4 09/29/2022    HCT 37.2 09/29/2022    MCV 95.5 09/29/2022     09/29/2022     Lab Results   Component Value Date    SEDRATE 18 09/29/2022     Lab Results   Component Value Date CRP 3.4 09/29/2022       Assessment and Plan:      Diagnosis Orders   1. Sprain of left shoulder, unspecified shoulder sprain type, sequela  MRI SHOULDER LEFT WO CONTRAST            Here ~4 months after dislocation with reduction by Dr. Bernadette Lynn of the left elbow as well as shoulder injury. She also suffered a left shoulder injury at the time of the event. She has been [place] this injury for 4 months which includes therapy, anti-inflammatories. We have discussed further modalities of treatment today. She does have some quite appreciable flexion deficits and would like to obtain an MRI as there is concern for soft tissue injury. Temporal deviation with her symptoms we will go with an injection to that shoulder today. I will call her when to get the MRI results as she has failed all other conservative modalities of treatment. Regards to her left elbow she has some extensor lag about 10 degrees of neutral.  This is likely her new baseline however she was instructed continue with stretching especially with extension after her dislocation. SHOULDER INJECTION:  Subacromial steroid injection of left shoulder: The risks, benefits, alternatives, procedure, and convalescence for corticosteroid injection of left shoulder subacromial bursa was discussed with patient. The risk discussed included, but were not limited to, infection, limited efficacy, blanching of the skin, localized atrophy of the adipose and muscle tissue, localized contusion and swelling, and persistent or recurrent discomfort. Patient verbalized understanding and agreed to proceed after all questions were answered. The patient was injected in seated position with the left upper arm adducted against the torso. The lateral and posterolateral aspects of the shoulder were prepped with Betadine solution. Topical cold spray was applied at the planned injection site.   An injection consisting of 1 mL of Depo-Medrol and 4 mL of 1% lidocaine was administered from an entry point along posterior lateral extent of the acromion using a syringe and a 21-gauge needle directed toward the coracoid. Hemostasis was achieved using direct pressure, and the injection site was cleansed with alcohol pads and a bandage applied. This was well-tolerated by the patient, and the patient was neurovascularly intact following the procedure. The above plan was discussed in thorough detail with Dr. Lawanda Larsen and the patient. No orders of the defined types were placed in this encounter. No orders of the defined types were placed in this encounter. No follow-ups on file.     Tejinder Otero PA-C  [facility]  [phone]

## 2023-02-03 ENCOUNTER — HOSPITAL ENCOUNTER (OUTPATIENT)
Dept: MRI IMAGING | Age: 48
End: 2023-02-03
Payer: MEDICAID

## 2023-02-03 DIAGNOSIS — S43.402S SPRAIN OF LEFT SHOULDER, UNSPECIFIED SHOULDER SPRAIN TYPE, SEQUELA: ICD-10-CM

## 2023-02-03 PROCEDURE — 73221 MRI JOINT UPR EXTREM W/O DYE: CPT

## 2023-02-06 ENCOUNTER — CLINICAL DOCUMENTATION (OUTPATIENT)
Dept: PHYSICAL THERAPY | Age: 48
End: 2023-02-06

## 2023-02-06 NOTE — DISCHARGE SUMMARY
[facility]    []  [address]  [x]  [facility]   [address] [place] [address].      [facility], [address]     [address]  Ph: [phone]     Ph: [phone]  Fax: [phone]     Fax: [phone]    [] Certification  [] Recertification []  Plan of Care  [] Progress Note [x] Discharge    Date: 2023  Patient Name: Coleman Rodriguez  : 1975  MRN: [id number]    To:  Dr. Ina Franco        From: Gita Owusu PT        [x]   Patient was previously on hold since 1/10/23 and is now appropriate for    discharge. Please see last POC/ Progress Report for last measured    functional status. Comments: Pt states she received injection on  with sig relief. Pt states no functional limitations at this time. Had MRI 2/3/23. Pt requests discharge from PT at this time. Thank you for your referral and the opportunity to treat this patient. Please contact us with any questions or concerns.

## 2023-03-08 ENCOUNTER — SCHEDULED TELEPHONE ENCOUNTER (OUTPATIENT)
Dept: ORTHOPEDIC SURGERY | Age: 48
End: 2023-03-08
Payer: MEDICAID

## 2023-03-08 PROCEDURE — 99442 PR PHYS/QHP TELEPHONE EVALUATION 11-20 MIN: CPT | Performed by: PHYSICIAN ASSISTANT

## 2023-03-08 NOTE — PROGRESS NOTES
Willie Puente is a 49 y.o. female evaluated via telephone on 3/8/2023. Consent:  She and/or health care decision maker is aware that that she may receive a bill for this telephone service, which includes applicable co-pays, depending on her insurance coverage, and has provided verbal consent to proceed. Documentation:    Elliott Pavon was scheduled for virtual appointment today to discuss her MRI findings which are below. She has a partial-thickness tear of the infraspinatus at the footprint, mild degenerative changes, hypertrophy of the capsule and labral fraying. She also has mild to moderate AC arthritis. Thus far she has worked with therapy for over 6 weeks, has received 1 injection. Injection did help for about 6 to 8 weeks but her symptoms have since returned. She still having deficits with flexion. We discussed further modalities of treatment, which includes continuance of therapy exercises and possibly another injection. Given her social concerns with her father that she is taking care of at her house she wants to defer from any surgical consultation at this time. We will continue with anti-inflammatories, exercises until she makes an appointment with us for shoulder injection on the left side. She understands that she cannot have any surgery within 3 months after this injection and this will likely be her last injection without discussing further with Dr. Julia Lao:   Multiplanar multisequence MRI of the left shoulder was performed without the   administration of intravenous contrast.       COMPARISON:   None. HISTORY:   ORDERING SYSTEM PROVIDED HISTORY: Sprain of left shoulder, unspecified   shoulder sprain type, sequela   TECHNOLOGIST PROVIDED HISTORY:   What reading provider will be dictating this exam?->CRC       FINDINGS:   ROTATOR CUFF: Partial-thickness interstitial infraspinatus tear at the   footprint involving less than 50% of the tendon.   Mild supraspinatus tendinosis. Intact subscapularis and teres minor tendons. No significant   muscle edema or atrophy. BICEPS TENDON: Intact vertical and horizontal portions of the long head of   the biceps tendon. LABRUM: Degenerative tears throughout the labrum. No paralabral cyst.       GLENOHUMERAL JOINT: Physiologic amount of joint fluid. Mild chondral   thinning. No evidence of high-grade cartilage loss. Normal alignment. AC JOINT AND ACROMIOCLAVICULAR ARCH: No significant acromial downsloping or   subacromial spur. Mild to moderate degenerative changes with joint space   narrowing, marginal osteophytes, capsular hypertrophy, and pericapsular   edema. Intact coracoacromial and coracoclavicular ligaments. No significant   subacromial/subdeltoid bursitis. BONE MARROW: No evidence of fracture. Normal marrow signal.       OUTLET SPACES: Normal MRI appearance of the quadrilateral space. No   significant narrowing of the supraspinatus outlet. Impression   Low-grade interstitial tear involving the infraspinatus at the footprint. Normal rotator cuff muscle bulk. 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 Mild glenohumeral   chondrosis. I affirm this is a Patient Initiated Episode with a Patient who has not had a related appointment within my department in the past 7 days or scheduled within the next 24 hours. Patient identification was verified at the start of the visit: Yes    Total Time: minutes: 11-20 minutes    The patient was evaluated through a synchronous/real-time VV encounter. The patient and/or guardian is aware that this is a billable service which includes applicable co-pays. The virtual visit was conducted with patient consent. The visit was conducted pursuant to the emergency declaration under the [place] act and national emergencies act [facility] waiver authority and the coronavirus preparedness and response supplemental appropriateness act.   Patient identification was verified, and a caregiver was present when appropriate. The patient was located in a state where the provider was licensed to provide this care.     Note: not billable if this call serves to triage the patient into an appointment for the relevant concern      Kevon Flores PA-C

## 2023-05-02 ENCOUNTER — OFFICE VISIT (OUTPATIENT)
Dept: FAMILY MEDICINE CLINIC | Age: 48
End: 2023-05-02

## 2023-05-02 VITALS
TEMPERATURE: 97.6 F | DIASTOLIC BLOOD PRESSURE: 68 MMHG | HEART RATE: 114 BPM | WEIGHT: 189 LBS | HEIGHT: 69 IN | RESPIRATION RATE: 14 BRPM | BODY MASS INDEX: 27.99 KG/M2 | SYSTOLIC BLOOD PRESSURE: 108 MMHG | OXYGEN SATURATION: 99 %

## 2023-05-02 DIAGNOSIS — E78.2 MIXED HYPERLIPIDEMIA: ICD-10-CM

## 2023-05-02 DIAGNOSIS — M54.50 ACUTE BILATERAL LOW BACK PAIN WITHOUT SCIATICA: Primary | ICD-10-CM

## 2023-05-02 RX ORDER — KETOROLAC TROMETHAMINE 10 MG/1
10 TABLET, FILM COATED ORAL EVERY 6 HOURS PRN
Qty: 20 TABLET | Refills: 0 | Status: SHIPPED | OUTPATIENT
Start: 2023-05-02 | End: 2024-05-01

## 2023-05-02 RX ORDER — CYCLOBENZAPRINE HCL 10 MG
10 TABLET ORAL 2 TIMES DAILY PRN
Qty: 45 TABLET | Refills: 0 | Status: SHIPPED | OUTPATIENT
Start: 2023-05-02 | End: 2023-06-01

## 2023-05-02 RX ORDER — PREDNISONE 20 MG/1
TABLET ORAL
Qty: 18 TABLET | Refills: 0 | Status: SHIPPED | OUTPATIENT
Start: 2023-05-02 | End: 2023-05-11

## 2023-05-02 RX ORDER — KETOROLAC TROMETHAMINE 30 MG/ML
60 INJECTION, SOLUTION INTRAMUSCULAR; INTRAVENOUS ONCE
Status: COMPLETED | OUTPATIENT
Start: 2023-05-02 | End: 2023-05-02

## 2023-05-02 RX ADMIN — KETOROLAC TROMETHAMINE 60 MG: 30 INJECTION, SOLUTION INTRAMUSCULAR; INTRAVENOUS at 15:49

## 2023-05-02 SDOH — ECONOMIC STABILITY: FOOD INSECURITY: WITHIN THE PAST 12 MONTHS, YOU WORRIED THAT YOUR FOOD WOULD RUN OUT BEFORE YOU GOT MONEY TO BUY MORE.: NEVER TRUE

## 2023-05-02 SDOH — ECONOMIC STABILITY: FOOD INSECURITY: WITHIN THE PAST 12 MONTHS, THE FOOD YOU BOUGHT JUST DIDN'T LAST AND YOU DIDN'T HAVE MONEY TO GET MORE.: NEVER TRUE

## 2023-05-02 SDOH — ECONOMIC STABILITY: INCOME INSECURITY: HOW HARD IS IT FOR YOU TO PAY FOR THE VERY BASICS LIKE FOOD, HOUSING, MEDICAL CARE, AND HEATING?: NOT HARD AT ALL

## 2023-05-02 SDOH — ECONOMIC STABILITY: HOUSING INSECURITY
IN THE LAST 12 MONTHS, WAS THERE A TIME WHEN YOU DID NOT HAVE A STEADY PLACE TO SLEEP OR SLEPT IN A SHELTER (INCLUDING NOW)?: NO

## 2023-05-02 ASSESSMENT — ENCOUNTER SYMPTOMS
SINUS PAIN: 0
EYE PAIN: 0
BACK PAIN: 1
SINUS PRESSURE: 0
ABDOMINAL PAIN: 0
DIARRHEA: 0
COUGH: 0
EYE DISCHARGE: 0
PHOTOPHOBIA: 0
SHORTNESS OF BREATH: 0
EYE ITCHING: 0
VOMITING: 0
TROUBLE SWALLOWING: 0
CONSTIPATION: 0
CHEST TIGHTNESS: 0
RHINORRHEA: 0
SORE THROAT: 0
WHEEZING: 0
EYE REDNESS: 0
BOWEL INCONTINENCE: 0
NAUSEA: 0

## 2023-05-02 ASSESSMENT — VISUAL ACUITY: OU: 1

## 2023-05-02 NOTE — PROGRESS NOTES
Subjective:      Patient ID: Temitope Man is a 49 y.o. female who present today with:      Chief Complaint   Patient presents with    Lower Back Pain     Patient presents today with lower back pain when she picked up something while cleaning today. Back Pain  This is a recurrent problem. The current episode started today. The problem occurs constantly. The problem has been rapidly worsening since onset. The pain is present in the lumbar spine. The quality of the pain is described as cramping. The pain does not radiate. The pain is at a severity of 7/10. The pain is severe. The pain is The same all the time. The symptoms are aggravated by bending, coughing and position. Pertinent negatives include no abdominal pain, bladder incontinence, bowel incontinence, chest pain, dysuria, fever, headaches, leg pain, numbness, paresis, paresthesias, pelvic pain, perianal numbness, tingling, weakness or weight loss. Risk factors include menopause. She has tried nothing for the symptoms. The treatment provided no relief.      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 and threw her back out  This happens a few times a year  Waited too long last time    Lab orders   Needs new orders    Past Medical History:   Diagnosis Date    Depression      Patient Active Problem List    Diagnosis Date Noted    Bipolar II disorder ([facility] [address] [facility] 2020    Alcohol use disorder, moderate, dependence ([facility] [address] [facility] 2020    JAIME (generalized anxiety disorder) 2019     Past Surgical History:   Procedure Laterality Date    BREAST ENHANCEMENT SURGERY       Social History     Socioeconomic History    Marital status: Single     Spouse name: None    Number of children: None    Years of education: None    Highest education level: None   Tobacco Use    Smoking status: Every Day     Packs/day: 1.00     Types: Cigarettes    Smokeless tobacco: Never   Vaping Use    Vaping Use: Never used

## 2023-05-08 DIAGNOSIS — E78.2 MIXED HYPERLIPIDEMIA: ICD-10-CM

## 2023-05-08 LAB
ALBUMIN SERPL-MCNC: 4.1 G/DL (ref 3.5–4.6)
ALP SERPL-CCNC: 40 U/L (ref 40–130)
ALT SERPL-CCNC: 18 U/L (ref 0–33)
ANION GAP SERPL CALCULATED.3IONS-SCNC: 13 MEQ/L (ref 9–15)
AST SERPL-CCNC: 15 U/L (ref 0–35)
BASOPHILS # BLD: 0.1 K/UL (ref 0–0.2)
BASOPHILS NFR BLD: 0.8 %
BILIRUB SERPL-MCNC: 0.3 MG/DL (ref 0.2–0.7)
BUN SERPL-MCNC: 19 MG/DL (ref 6–20)
CALCIUM SERPL-MCNC: 9 MG/DL (ref 8.5–9.9)
CHLORIDE SERPL-SCNC: 105 MEQ/L (ref 95–107)
CHOLEST SERPL-MCNC: 273 MG/DL (ref 0–199)
CO2 SERPL-SCNC: 25 MEQ/L (ref 20–31)
CREAT SERPL-MCNC: 0.8 MG/DL (ref 0.5–0.9)
EOSINOPHIL # BLD: 0.2 K/UL (ref 0–0.7)
EOSINOPHIL NFR BLD: 1.9 %
ERYTHROCYTE [DISTWIDTH] IN BLOOD BY AUTOMATED COUNT: 13.4 % (ref 11.5–14.5)
GLOBULIN SER CALC-MCNC: 2.2 G/DL (ref 2.3–3.5)
GLUCOSE SERPL-MCNC: 71 MG/DL (ref 70–99)
HCT VFR BLD AUTO: 38.8 % (ref 37–47)
HDLC SERPL-MCNC: 57 MG/DL (ref 40–59)
HGB BLD-MCNC: 13.1 G/DL (ref 12–16)
LDLC SERPL CALC-MCNC: 183 MG/DL (ref 0–129)
LYMPHOCYTES # BLD: 3.7 K/UL (ref 1–4.8)
LYMPHOCYTES NFR BLD: 39.3 %
MCH RBC QN AUTO: 32 PG (ref 27–31.3)
MCHC RBC AUTO-ENTMCNC: 33.8 % (ref 33–37)
MCV RBC AUTO: 94.4 FL (ref 79.4–94.8)
MONOCYTES # BLD: 0.5 K/UL (ref 0.2–0.8)
MONOCYTES NFR BLD: 5.5 %
NEUTROPHILS # BLD: 5 K/UL (ref 1.4–6.5)
NEUTS SEG NFR BLD: 52.5 %
PLATELET # BLD AUTO: 222 K/UL (ref 130–400)
POTASSIUM SERPL-SCNC: 3.9 MEQ/L (ref 3.4–4.9)
PROT SERPL-MCNC: 6.3 G/DL (ref 6.3–8)
RBC # BLD AUTO: 4.11 M/UL (ref 4.2–5.4)
SODIUM SERPL-SCNC: 143 MEQ/L (ref 135–144)
TRIGL SERPL-MCNC: 165 MG/DL (ref 0–150)
WBC # BLD AUTO: 9.4 K/UL (ref 4.8–10.8)

## 2023-05-09 ENCOUNTER — OFFICE VISIT (OUTPATIENT)
Dept: FAMILY MEDICINE CLINIC | Age: 48
End: 2023-05-09
Payer: MEDICAID

## 2023-05-09 VITALS
HEART RATE: 93 BPM | WEIGHT: 194 LBS | BODY MASS INDEX: 28.73 KG/M2 | HEIGHT: 69 IN | DIASTOLIC BLOOD PRESSURE: 70 MMHG | SYSTOLIC BLOOD PRESSURE: 116 MMHG

## 2023-05-09 DIAGNOSIS — Z12.11 SCREEN FOR COLON CANCER: Primary | ICD-10-CM

## 2023-05-09 DIAGNOSIS — F31.81 BIPOLAR II DISORDER (HCC): ICD-10-CM

## 2023-05-09 DIAGNOSIS — M54.50 ACUTE BILATERAL LOW BACK PAIN WITHOUT SCIATICA: ICD-10-CM

## 2023-05-09 DIAGNOSIS — E78.2 MIXED HYPERLIPIDEMIA: ICD-10-CM

## 2023-05-09 PROCEDURE — 99214 OFFICE O/P EST MOD 30 MIN: CPT | Performed by: NURSE PRACTITIONER

## 2023-05-09 PROCEDURE — 4004F PT TOBACCO SCREEN RCVD TLK: CPT | Performed by: NURSE PRACTITIONER

## 2023-05-09 PROCEDURE — G8419 CALC BMI OUT NRM PARAM NOF/U: HCPCS | Performed by: NURSE PRACTITIONER

## 2023-05-09 PROCEDURE — G8427 DOCREV CUR MEDS BY ELIG CLIN: HCPCS | Performed by: NURSE PRACTITIONER

## 2023-05-09 ASSESSMENT — ENCOUNTER SYMPTOMS
GASTROINTESTINAL NEGATIVE: 1
TROUBLE SWALLOWING: 0
DIARRHEA: 0
VOICE CHANGE: 0
ABDOMINAL PAIN: 0
RESPIRATORY NEGATIVE: 1
EYES NEGATIVE: 1
RECTAL PAIN: 0
SHORTNESS OF BREATH: 0
ANAL BLEEDING: 0
ALLERGIC/IMMUNOLOGIC NEGATIVE: 1
COLOR CHANGE: 0
BLOOD IN STOOL: 0
CONSTIPATION: 0

## 2023-05-09 NOTE — PROGRESS NOTES
Andrea Sheikh (:  1975) is a Established patient, here for evaluation of the following:    Assessment & Plan   Below is the assessment and plan developed based on review of pertinent history, physical exam, labs, studies, and medications. 1. Screen for colon cancer  -     Fecal DNA Colorectal cancer screening (Cologuard)  2. Acute bilateral low back pain without sciatica  3. Mixed hyperlipidemia  4. Bipolar II disorder ([facility]    Return in about 6 months (around 2023). Subjective   Father  3 weeks ago she was PCG for 8 years   Hypertension  This is a new problem. The current episode started more than 1 month ago. The problem has been gradually improving since onset. Associated symptoms include anxiety. Pertinent negatives include no blurred vision, chest pain, headaches, malaise/fatigue, neck pain, orthopnea, palpitations, peripheral edema, PND, shortness of breath or sweats. Past treatments include ACE inhibitors. The current treatment provides moderate improvement. There are no compliance problems. BP has been runnig low AT HOME SO SHE STOPPED THE AMLODIPINE  Hyperlipidemia  Pertinent negatives include no chest pain, leg pain or shortness of breath. -  she will restart lipitor    Bipolar-  topiramate working well helping with anxiety also    Review of Systems   Constitutional: Negative. Negative for activity change, appetite change, fatigue and unexpected weight change. HENT: Negative. Negative for dental problem, nosebleeds, trouble swallowing and voice change. Eyes: Negative. Negative for visual disturbance. Respiratory: Negative. Negative for shortness of breath. Cardiovascular: Negative. Negative for chest pain, palpitations and leg swelling. Gastrointestinal: Negative. Negative for abdominal pain, anal bleeding, blood in stool, constipation, diarrhea and rectal pain. Endocrine: Negative.   Negative for cold intolerance, heat intolerance, polydipsia, polyphagia and

## 2023-06-21 ENCOUNTER — HOSPITAL ENCOUNTER (OUTPATIENT)
Dept: PHYSICAL THERAPY | Age: 48
Setting detail: THERAPIES SERIES
Discharge: HOME | End: 2023-06-21
Payer: MEDICAID

## 2023-06-21 PROCEDURE — 97110 THERAPEUTIC EXERCISES: CPT

## 2023-06-21 PROCEDURE — 97161 PT EVAL LOW COMPLEX 20 MIN: CPT

## 2023-06-21 ASSESSMENT — PAIN DESCRIPTION - PAIN TYPE: TYPE: CHRONIC PAIN

## 2023-06-21 ASSESSMENT — PAIN SCALES - GENERAL: PAINLEVEL_OUTOF10: 2

## 2023-06-21 ASSESSMENT — PAIN DESCRIPTION - LOCATION: LOCATION: SHOULDER

## 2023-06-21 ASSESSMENT — PAIN DESCRIPTION - ORIENTATION: ORIENTATION: LEFT;ANTERIOR

## 2023-06-21 ASSESSMENT — PAIN DESCRIPTION - DESCRIPTORS: DESCRIPTORS: ACHING

## 2023-06-21 NOTE — PROGRESS NOTES
[address]  PHYSICAL THERAPY PLAN OF CARE   [address] [place], [address] [facility]       Phone: [phone]  Fax: [phone]    [] Certification  [] Recertification [x]  Plan of Care  [] Progress Note [] Discharge      Referring Provider: Lorena Rivero PA-C     From:  TORITO Bryson/Amanda Ramon PT/DPT  Patient: Kam Bradford (49 y.o. female) : 1975 Date: 2023  Medical Diagnosis: Medial epicondylitis of right elbow [M77.01]  Sprain of left shoulder, unspecified shoulder sprain type, sequela [S43.402S]       Treatment Diagnosis: decreased B periscapular strength, decreased B shoulder strength, decreased tolerance to overhead activities, decreased posture, and pain    Progress Report Period from:  2023  to 2023    Visits to Date: 1 No Show: 0 Cancelled Appts: 0    OBJECTIVE:   Short Term Goals - Time Frame for Short Term Goals: 2 weeks    Goals Current/Discharge status  Status   Short Term Goal 1: Patient will independently demonstrate proper taping technique over R medial elbow in order to prevent future strain/injury. Patient not given taping technique, will trial NV. New   Long Term Goals - Time Frame for Long Term Goals : 6 weeks  Goals Current/ Discharge status Status   Long Term Goal 1: Patient will demonstrate 5/5 gross strength in B shoulders and >/= 4+/5 strength in B periscapular musculature in order to improve ease with overhead/reaching activities.  Strength LUE  L Shoulder Flexion: 4+/5  L Shoulder ABduction: 4/5  L Shoulder Internal Rotation: 4+/5  L Shoulder External Rotation: 4+/5  L Middle Trapezius: 3+/5 (pain along medial border of scapula)  L Rhomboids: 3+/5 (pain along medial border of scapula)  L Lower Trapezius: 3+/5 (pain along medial border of scapula)  L Latissimus Dorsi: 4/5  Strength RUE  R Shoulder Flexion: 4+/5  R Shoulder ABduction: 4+/5  R Shoulder Internal Rotation: 4+/5  R Shoulder
extensor mass    Left AROM  Right AROM      General AROM UE: Right WFL, Left WFL  AROM LUE (degrees)  L Shoulder Flexion (0-180): WFL  L Shoulder ABduction (0-180): WFL (painful arc)  L Shoulder Int Rotation  (0-70): T9  L Shoulder Ext Rotation  (0-90): T3  L Wrist Flexion (0-80): 70 General AROM UE: Right WFL, Left WFL  AROM RUE (degrees)  R Shoulder Flexion (0-180): WFL  R Shoulder ABduction (0-180): WFL  R Shoulder Int Rotation  (0-70): L1  R Shoulder Ext Rotation (0-90): T3  R Wrist Flexion (0-80): 68      Left Strength  Right Strength      Strength LUE  L Shoulder Flexion: 4+/5  L Shoulder ABduction: 4/5  L Shoulder Internal Rotation: 4+/5  L Shoulder External Rotation: 4+/5  L Elbow Flexion: 5/5  L Elbow Extension: 5/5  L Wrist Flexion: 5/5  L Wrist Extension: 5/5  L Middle Trapezius: 3+/5 (pain along medial border of scapula)  L Rhomboids: 3+/5 (pain along medial border of scapula)  L Lower Trapezius: 3+/5 (pain along medial border of scapula)  L Latissimus Dorsi: 4/5 Strength RUE  R Shoulder Flexion: 4+/5  R Shoulder ABduction: 4+/5  R Shoulder Internal Rotation: 4+/5  R Shoulder External Rotation: 4+/5  R Elbow Flexion: 5/5  R Elbow Extension: 5/5  R Wrist Flexion: 4+/5 (pain over medial epicondyle; flexion and ulnar deviation painful)  R Wrist Extension: 5/5  R Middle Trapezius: 4/5  R Rhomboids: 4/5  R Lower Trapezius: 4-/5   R Latissimus Dorsi: 4+/5      Outcomes Score:  Exam: LEFS 34/80    Treatment:    Exercises:   Exercises  Exercise 1: corner stretch with arms down at sides 30\" x3  Exercise 2: rows with yellow TB 2x10 (cueing for scap retraction not just shoulder extension)  Exercise 3: TB flexion/abduction*  Exercise 4: lats with TB*  Exercise 5: ball at wall stabilization*  Exercise 6: 3 point touches at wall*  Exercise 7: quadruped*  Exercise 8: chest pulls*  Exercise 9: standing scap series*  Exercise 20: HEP: corner stretch, TB rows  Treatment Reasoning  Limitations addressed:  Mobility,

## 2023-06-23 ENCOUNTER — HOSPITAL ENCOUNTER (OUTPATIENT)
Dept: PHYSICAL THERAPY | Age: 48
Setting detail: THERAPIES SERIES
Discharge: HOME | End: 2023-06-23
Payer: MEDICAID

## 2023-06-23 PROCEDURE — 97110 THERAPEUTIC EXERCISES: CPT

## 2023-06-23 ASSESSMENT — PAIN DESCRIPTION - ORIENTATION: ORIENTATION: LEFT

## 2023-06-23 ASSESSMENT — PAIN DESCRIPTION - LOCATION: LOCATION: SHOULDER

## 2023-06-23 ASSESSMENT — PAIN SCALES - GENERAL: PAINLEVEL_OUTOF10: 2

## 2023-06-23 ASSESSMENT — PAIN DESCRIPTION - DESCRIPTORS: DESCRIPTORS: ACHING

## 2023-06-23 NOTE — PROGRESS NOTES
[address] [facility]  Outpatient Physical Therapy    Treatment Note        Date: 2023  Patient: Cecilia Gutierrez  : 1975   Confirmed: Yes  MRN: [id number]  Referring Provider: Candi Jewell PA-C    Medical Diagnosis: Medial epicondylitis of right elbow [M77.01]  Sprain of left shoulder, unspecified shoulder sprain type, sequela [S43.402S]       Treatment Diagnosis: decreased B periscapular strength, decreased B shoulder strength, decreased tolerance to overhead activities, decreased posture, and pain    Visit Information:  Insurance: Payor: [employer] / Plan: [employer] / Product Type: *No Product type* /   PT Visit Information  Total # of Visits Approved: 30  Total # of Visits to Date: 2  No Show: 0  Canceled Appointment: 0  Progress Note Counter: -    Subjective Information:  Subjective: Pt reports thing have been going well. She has been doing yard work lately. Pt noted she does feel weakness when performing activities. HEP Compliance:  [x] Good [] Fair [] Poor [] Reports not doing due to:    Pain Screening  Patient Currently in Pain: Yes  Pain Assessment: 0-10  Pain Level: 2  Pain Location: Shoulder  Pain Orientation: Left  Pain Descriptors: Aching    Treatment:  Exercises:  Exercises  Exercise 1: corner stretch with arms down at sides 30\" x3  Exercise 2: rows/ lat pull with RTB 2x10 (cueing for scap retraction not just shoulder extension)  Exercise 3: RTB flexion/abduction x15  Exercise 5: ball at wall stabilization x15 up/down, L/R, CW, CCW  Exercise 6: 3 point touches at wall 5\" hold x10  Exercise 8: chest pulls RTB x15  Exercise 20: HEP: current + lat pull/ TB flex and ABD/ chest pull         *Indicates exercise, modality, or manual techniques to be initiated when appropriate    Objective Measures:     LTG 4 Current Status[de-identified] 23 Initiated HEP    Assessment:    Body Structures, Functions, Activity Limitations Requiring Skilled

## 2023-06-26 ENCOUNTER — HOSPITAL ENCOUNTER (OUTPATIENT)
Dept: PHYSICAL THERAPY | Age: 48
Setting detail: THERAPIES SERIES
Discharge: HOME | End: 2023-06-26
Payer: MEDICAID

## 2023-06-26 PROCEDURE — 97110 THERAPEUTIC EXERCISES: CPT

## 2023-06-26 PROCEDURE — 97140 MANUAL THERAPY 1/> REGIONS: CPT

## 2023-06-26 ASSESSMENT — PAIN SCALES - GENERAL: PAINLEVEL_OUTOF10: 4

## 2023-06-26 ASSESSMENT — PAIN DESCRIPTION - LOCATION: LOCATION: SHOULDER

## 2023-06-26 ASSESSMENT — PAIN DESCRIPTION - PAIN TYPE: TYPE: CHRONIC PAIN

## 2023-06-26 ASSESSMENT — PAIN DESCRIPTION - DESCRIPTORS: DESCRIPTORS: SORE

## 2023-06-26 ASSESSMENT — PAIN DESCRIPTION - ORIENTATION: ORIENTATION: LEFT

## 2023-06-29 ENCOUNTER — HOSPITAL ENCOUNTER (OUTPATIENT)
Dept: PHYSICAL THERAPY | Age: 48
Setting detail: THERAPIES SERIES
Discharge: HOME | End: 2023-06-29
Payer: MEDICAID

## 2023-07-03 ENCOUNTER — HOSPITAL ENCOUNTER (OUTPATIENT)
Dept: PHYSICAL THERAPY | Age: 48
Setting detail: THERAPIES SERIES
Discharge: HOME | End: 2023-07-03
Payer: MEDICAID

## 2023-07-03 PROCEDURE — 97110 THERAPEUTIC EXERCISES: CPT

## 2023-07-03 ASSESSMENT — PAIN SCALES - GENERAL: PAINLEVEL_OUTOF10: 2

## 2023-07-03 ASSESSMENT — PAIN DESCRIPTION - ORIENTATION: ORIENTATION: LEFT

## 2023-07-03 ASSESSMENT — PAIN DESCRIPTION - LOCATION: LOCATION: SHOULDER

## 2023-07-03 ASSESSMENT — PAIN DESCRIPTION - PAIN TYPE: TYPE: CHRONIC PAIN

## 2023-07-03 ASSESSMENT — PAIN DESCRIPTION - DESCRIPTORS: DESCRIPTORS: SORE

## 2023-07-03 NOTE — PROGRESS NOTES
[address] [facility]  Outpatient Physical Therapy    Treatment Note        Date: 7/3/2023  Patient: Pola Kulkarni  : 1975   Confirmed: Yes  MRN: [id number]  Referring Provider: Allyn Abbasi PA-C    Medical Diagnosis: Medial epicondylitis of right elbow [M77.01]  Sprain of left shoulder, unspecified shoulder sprain type, sequela [S43.402S]       Treatment Diagnosis: decreased B periscapular strength, decreased B shoulder strength, decreased tolerance to overhead activities, decreased posture, and pain    Visit Information:  Insurance: Payor: [address] [place] [address] / Plan: [employer] / Product Type: *No Product type* /   PT Visit Information  Total # of Visits Approved: 30  Total # of Visits to Date: 4  No Show: 0  Canceled Appointment: 1  Progress Note Counter: -    Subjective Information:  Subjective: After the shot I feel like I can move a mountain but I might have been overdoing it. HEP Compliance:  [x] Good [] Fair [] Poor [] Reports not doing due to:    Pain Screening  Patient Currently in Pain: Yes  Pain Level: 2  Pain Type: Chronic pain  Pain Location: Shoulder  Pain Orientation: Left  Pain Descriptors: Sore    Treatment:  Exercises:  Exercises  Exercise 1: corner stretch with arms in low w position 30\" x3  Exercise 2: rows/ lat pull with RTB x15  Exercise 3: RTB flexion/abduction x15  Exercise 5: ball at wall stabilization x15 up/down, L/R, CW, CCW with .5kg ball  Exercise 6: 3 point touches at wall x10 with .5kg ball  Exercise 8: chest pulls RTB 4-way x10 each increased resistance  through  of RTB  Exercise 10: UBE L1.0 2'F/2'R  Treatment Reasoning  Limitations addressed: Mobility, Strength       *Indicates exercise, modality, or manual techniques to be initiated when appropriate    Objective Measures:           Assessment:    Body Structures, Functions, Activity Limitations Requiring Skilled Therapeutic Intervention: Decreased functional

## 2023-07-06 ENCOUNTER — NURSE ONLY (OUTPATIENT)
Dept: FAMILY MEDICINE CLINIC | Age: 48
End: 2023-07-06

## 2023-07-06 ENCOUNTER — HOSPITAL ENCOUNTER (OUTPATIENT)
Dept: PHYSICAL THERAPY | Age: 48
Setting detail: THERAPIES SERIES
Discharge: HOME | End: 2023-07-06
Payer: MEDICAID

## 2023-07-06 DIAGNOSIS — Z11.1 ENCOUNTER FOR PPD TEST: Primary | ICD-10-CM

## 2023-07-06 PROCEDURE — 97110 THERAPEUTIC EXERCISES: CPT

## 2023-07-06 NOTE — PROGRESS NOTES
status, Decreased tolerance to work activity, Decreased strength, Decreased posture, Increased pain  Assessment: Provided education on benefit of  completing therapy post recieving shot  for improve tolerance to activity to improve for when shot wears off, also educated on the importance of moderation. Pt verbalized understanding. Pt was demonstrating increased levels of fatigue with activities this date and expressed frustration that she felt like she should be further along to where the activities shouldn't be as hard. went over, overuse and how it can impact output. Pt demeaner improved. Pt request to decrease to once weekly due to work and school scheduling at this time. Progress as able. Treatment Diagnosis: decreased B periscapular strength, decreased B shoulder strength, decreased tolerance to overhead activities, decreased posture, and pain  Therapy Prognosis: Good       Patient Education: [x] NA       Post-Pain Assessment:       Pain Rating (0-10 pain scale):   2/10   Location and pain description same as pre-treatment unless indicated. Action: [] NA   [x] Perform HEP  [] Meds as prescribed  [] Modalities as prescribed   [] Call Physician     GOALS   Patient Goal(s): Patient Goals : \"strength-- lessen pain\"; get back to playing tennis    Short Term Goals Completed by 2 weeks Goal Status   STG 1 Patient will independently demonstrate proper taping technique over R medial elbow in order to prevent future strain/injury. In progress        Long Term Goals Completed by 6 weeks Goal Status   LTG 1 Patient will demonstrate 5/5 gross strength in B shoulders and >/= 4+/5 strength in B periscapular musculature in order to improve ease with overhead/reaching activities. In progress   LTG 2 Patient will demonstrate postural alignment requiring </=25% verbal cueing in order to increase postural awareness.  In progress   LTG 3 Patient will increase score on LEFS by >/=10 points in order to improve functional activity

## 2023-07-06 NOTE — PROGRESS NOTES
Pt here for 1st PPD Administration. Injection was administered in Left Forearm. Pt tolerated administration well. Pt was informed to come back in 48-72 hours to get it read. She was given weekend hours for walk-in. Pt verbalized understanding.

## 2023-07-08 LAB
INDURATION: NORMAL
TB SKIN TEST: NEGATIVE

## 2023-07-10 ENCOUNTER — APPOINTMENT (OUTPATIENT)
Dept: PHYSICAL THERAPY | Age: 48
End: 2023-07-10
Payer: MEDICAID

## 2023-07-13 ENCOUNTER — APPOINTMENT (OUTPATIENT)
Dept: PHYSICAL THERAPY | Age: 48
End: 2023-07-13
Payer: MEDICAID

## 2023-07-14 ENCOUNTER — NURSE ONLY (OUTPATIENT)
Dept: FAMILY MEDICINE CLINIC | Age: 48
End: 2023-07-14

## 2023-07-14 ENCOUNTER — HOSPITAL ENCOUNTER (OUTPATIENT)
Dept: PHYSICAL THERAPY | Age: 48
Setting detail: THERAPIES SERIES
Discharge: HOME | End: 2023-07-14
Payer: MEDICAID

## 2023-07-14 DIAGNOSIS — Z23 NEED FOR TUBERCULOSIS VACCINATION: Primary | ICD-10-CM

## 2023-07-14 PROCEDURE — 97110 THERAPEUTIC EXERCISES: CPT

## 2023-07-14 ASSESSMENT — PAIN SCALES - GENERAL: PAINLEVEL_OUTOF10: 2

## 2023-07-14 ASSESSMENT — PAIN DESCRIPTION - PAIN TYPE: TYPE: CHRONIC PAIN

## 2023-07-14 ASSESSMENT — PAIN DESCRIPTION - LOCATION: LOCATION: SHOULDER

## 2023-07-14 ASSESSMENT — PAIN DESCRIPTION - DESCRIPTORS: DESCRIPTORS: SORE

## 2023-07-14 ASSESSMENT — PAIN DESCRIPTION - ORIENTATION: ORIENTATION: LEFT

## 2023-07-14 NOTE — PROGRESS NOTES
[address] [facility]  Outpatient Physical Therapy    Treatment Note        Date: 2023  Patient: Alicia Dotson  : 1975   Confirmed: Yes  MRN: [id number]  Referring Provider: Reynaldo Menjviar PA-C    Medical Diagnosis: Medial epicondylitis of right elbow [M77.01]  Sprain of left shoulder, unspecified shoulder sprain type, sequela [S43.402S]       Treatment Diagnosis: decreased B periscapular strength, decreased B shoulder strength, decreased tolerance to overhead activities, decreased posture, and pain    Visit Information:  Insurance: Payor: [address] [place] [address] / Plan: [employer] / Product Type: *No Product type* /   PT Visit Information  Total # of Visits Approved: 30  Total # of Visits to Date: 6  No Show: 0  Canceled Appointment: 1  Progress Note Counter: -    Subjective Information:  Subjective: Pt reports she continues to perform HEP as best she can. Says she is feeling increased pain in the medial side of the bilateral elbow. 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

## 2023-07-21 ENCOUNTER — HOSPITAL ENCOUNTER (OUTPATIENT)
Dept: PHYSICAL THERAPY | Age: 48
Setting detail: THERAPIES SERIES
Discharge: HOME | End: 2023-07-21
Payer: MEDICAID

## 2023-07-21 PROCEDURE — 97110 THERAPEUTIC EXERCISES: CPT

## 2023-07-21 NOTE — PROGRESS NOTES
increase postural awareness. In progress   LTG 3 Patient will increase score on LEFS by >/=10 points in order to improve functional activity tolerance. In progress   LTG 4 Patient will be compliant and demonstrate independent understanding of HEP in order to self-manage symptoms at time of discharge. In progress               Plan:  Frequency/Duration:  Plan  Plan Frequency: 1-2  Plan weeks: 4-6  Current Treatment Recommendations: Strengthening, Return to work related activity, Pain management, Manual, Neuromuscular re-education, Home exercise program, Safety education & training, Patient/Caregiver education & training, Modalities, Positioning, Dry needling  Modalities: Heat/Cold, Ultrasound, E-stim - unattended  Additional Comments: decrease to once weekly for 3 weeks due to schedule changes at home  Pt to continue current HEP. See objective section for any therapeutic exercise changes, additions or modifications this date.     Therapy Time:      PT Individual Minutes  Time In: 5762  Time Out: 7149  Minutes: 43  Timed Code Treatment Minutes: 43 Minutes  Procedure Minutes: 0  Timed Activity Minutes Units   Ther Ex 43  3     Electronically signed by Aldair Denis PTA on 7/21/23 at 11:14 AM EDT

## 2023-08-02 ENCOUNTER — OFFICE VISIT (OUTPATIENT)
Dept: FAMILY MEDICINE CLINIC | Age: 48
End: 2023-08-02
Payer: MEDICAID

## 2023-08-02 VITALS
DIASTOLIC BLOOD PRESSURE: 76 MMHG | HEIGHT: 69 IN | TEMPERATURE: 98.1 F | BODY MASS INDEX: 28.73 KG/M2 | HEART RATE: 93 BPM | WEIGHT: 194 LBS | OXYGEN SATURATION: 97 % | SYSTOLIC BLOOD PRESSURE: 100 MMHG

## 2023-08-02 DIAGNOSIS — M54.6 ACUTE BILATERAL THORACIC BACK PAIN: Primary | ICD-10-CM

## 2023-08-02 DIAGNOSIS — M54.50 LUMBAR BACK PAIN: ICD-10-CM

## 2023-08-02 PROCEDURE — G8427 DOCREV CUR MEDS BY ELIG CLIN: HCPCS | Performed by: NURSE PRACTITIONER

## 2023-08-02 PROCEDURE — 4004F PT TOBACCO SCREEN RCVD TLK: CPT | Performed by: NURSE PRACTITIONER

## 2023-08-02 PROCEDURE — G8419 CALC BMI OUT NRM PARAM NOF/U: HCPCS | Performed by: NURSE PRACTITIONER

## 2023-08-02 PROCEDURE — 99213 OFFICE O/P EST LOW 20 MIN: CPT | Performed by: NURSE PRACTITIONER

## 2023-08-02 RX ORDER — METHYLPREDNISOLONE 4 MG/1
TABLET ORAL
Qty: 1 TABLET | Refills: 0 | Status: SHIPPED | OUTPATIENT
Start: 2023-08-02 | End: 2023-08-08

## 2023-08-02 RX ORDER — CYCLOBENZAPRINE HCL 10 MG
10 TABLET ORAL NIGHTLY PRN
Qty: 10 TABLET | Refills: 0 | Status: SHIPPED | OUTPATIENT
Start: 2023-08-02 | End: 2023-08-12

## 2023-08-02 NOTE — PROGRESS NOTES
weight. She is not ill-appearing, toxic-appearing or diaphoretic. HENT:      Head: Normocephalic and atraumatic. Right Ear: Hearing normal.      Left Ear: Hearing normal.      Nose: Nose normal.      Mouth/Throat:      Lips: Pink. Eyes:      General: Lids are normal.      Extraocular Movements: Extraocular movements intact. Conjunctiva/sclera: Conjunctivae normal.   Neck:      Trachea: Trachea normal.   Cardiovascular:      Rate and Rhythm: Normal rate and regular rhythm. Pulses: Normal pulses. Heart sounds: Normal heart sounds, S1 normal and S2 normal.   Pulmonary:      Effort: Pulmonary effort is normal.      Breath sounds: Normal breath sounds and air entry. Musculoskeletal:         General: Tenderness and signs of injury present. Cervical back: Normal range of motion and neck supple. Thoracic back: Spasms and tenderness present. Decreased range of motion. Skin:     General: Skin is warm and dry. Capillary Refill: Capillary refill takes less than 2 seconds. Neurological:      General: No focal deficit present. Mental Status: She is alert and oriented to person, place, and time. Mental status is at baseline. Psychiatric:         Attention and Perception: Attention and perception normal.         Mood and Affect: Mood and affect normal.         Speech: Speech normal.         Behavior: Behavior normal. Behavior is cooperative. Thought Content: Thought content normal.         Cognition and Memory: Cognition and memory normal.         Judgment: Judgment normal.       Assessment:       Diagnosis Orders   1. Acute bilateral thoracic back pain  XR THORACIC SPINE (2 VIEWS)    methylPREDNISolone (MEDROL DOSEPACK) 4 MG tablet    [facility]      2. Lumbar back pain  methylPREDNISolone (MEDROL DOSEPACK) 4 MG tablet    [facility]        No results found for this visit on 08/02/23.    Plan:     Assessment & Plan

## 2023-08-03 ASSESSMENT — ENCOUNTER SYMPTOMS
VOMITING: 0
SHORTNESS OF BREATH: 0
ABDOMINAL PAIN: 0
BACK PAIN: 1
NAUSEA: 0
COUGH: 0
DIARRHEA: 0

## 2023-08-10 ENCOUNTER — HOSPITAL ENCOUNTER (OUTPATIENT)
Dept: PHYSICAL THERAPY | Age: 48
Setting detail: THERAPIES SERIES
Discharge: HOME | End: 2023-08-10
Payer: MEDICAID

## 2023-08-10 PROCEDURE — 97161 PT EVAL LOW COMPLEX 20 MIN: CPT

## 2023-08-10 PROCEDURE — 97110 THERAPEUTIC EXERCISES: CPT

## 2023-08-10 ASSESSMENT — PAIN DESCRIPTION - PAIN TYPE: TYPE: CHRONIC PAIN

## 2023-08-10 ASSESSMENT — PAIN DESCRIPTION - LOCATION: LOCATION: BACK

## 2023-08-10 ASSESSMENT — PAIN DESCRIPTION - DESCRIPTORS: DESCRIPTORS: ACHING;SORE

## 2023-08-10 ASSESSMENT — PAIN SCALES - GENERAL: PAINLEVEL_OUTOF10: 4

## 2023-08-10 ASSESSMENT — PAIN DESCRIPTION - ORIENTATION: ORIENTATION: MID

## 2023-08-11 NOTE — PROGRESS NOTES
[facility]   PHYSICAL THERAPY EVALUATION      Physical Therapy: Initial Evaluation    Patient: Alex Gutierrez (89 y.o.     female)   Examination Date:   Plan of Care Certification Period: 8/10/2023 to    Progress Note Counter:    :  1975 ;    Confirmed: Yes MRN: [id number]  CSN: [id number]   Insurance: Payor: Marybeth Haley / Plan: Kerwin Cowden / Product Type: *No Product type* /   Insurance ID: [id number] - (Medicaid Managed)  PT Insurance Information: [facility] (if applicable):    Referring Physician: NATASHA Pate CNP     PCP: NATASHA Kelly CNP Visits to Date/Visits Approved:     No Show/Cancelled Appts:      Medical Diagnosis: Acute bilateral thoracic back pain [M54.6]  Lumbar back pain [M54.50]    Treatment Diagnosis: decreased strength, decreased back flexibility, back pain     PERTINENT MEDICAL HISTORY           Medical History: Chart Reviewed: Yes   Past Medical History:   Diagnosis Date    Depression      Surgical History:   Past Surgical History:   Procedure Laterality Date    BREAST ENHANCEMENT SURGERY         Medications:   Current Outpatient Medications:     cyclobenzaprine (FLEXERIL) 10 MG tablet, Take 1 tablet by mouth nightly as needed for Muscle spasms, Disp: 10 tablet, Rfl: 0    topiramate (TOPAMAX) 50 MG tablet, Take 1 tablet by mouth 2 times daily, Disp: 60 tablet, Rfl: 5    Current Facility-Administered Medications:     levonorgestrel (MIRENA) IUD 52 mg 1 each, 1 each, IntraUTERine, Once, NATASHA Olivia CNP, 1 each at 20 6064  Allergies: Patient has no known allergies. SUBJECTIVE EXAMINATION     History obtained from[de-identified] Patient, Chart Review,           Subjective History: Onset Date: 23  Subjective: Pt states she has been having episodes of intermittent mid/ low thoracic back pain for 2 years.

## 2023-08-11 NOTE — PLAN OF CARE
PHYSICAL THERAPY PLAN OF CARE   [facility]      [address], [address] [facility], [address] [place] [address]     Ph: [phone] Fax: [phone]      [] Certification  [] Recertification [x]  Plan of Care  [] Progress Note [] Discharge      Referring Provider: NATASHA Garcia CNP      From:  Rene Bolaños, PT   Patient: Enzo Bolaños (89 y.o. female) : 1975 Date: 08/10/2023   Medical Diagnosis: Acute bilateral thoracic back pain [M54.6]  Lumbar back pain [M54.50]    Treatment Diagnosis: decreased strength, decreased back flexibility, back pain  Progress Report Period from:  8/10/2023  to 8/10/2023    Visits to Date: 8 No Show: 0 Cancelled Appts: 1    OBJECTIVE:   Short Term Goals - Time Frame for Short Term Goals: 2 weeks    Goals Current/Discharge status  Status   Short Term Goal 1: Patient will independently demonstrate proper taping technique over R medial elbow in order to prevent future strain/injury. Pt states no longer in need of tape due to minimal pain In progress   Short Term Goal 2: Pt will be independent with HEP to promote strengthening of core and decrease back pain  Education needed New     Long Term Goals - Time Frame for Long Term Goals : 6 weeks  Goals Current/ Discharge status Status   Long Term Goal 1: Patient will demonstrate 5/5 gross strength in B shoulders and >/= 4+/5 strength in B periscapular musculature in order to improve ease with overhead/reaching activities.        Strength RUE  R Shoulder Flexion: 4+/5  R Shoulder ABduction: 4+/5  R Shoulder Internal Rotation: 4+/5  R Shoulder External Rotation: 4+/5  R Elbow Flexion: 5/5  R Elbow Extension: 5/5  R Wrist Flexion: 5/5  R Wrist Extension: 5/5  R Wrist Radial Deviation: 5/5  R Wrist Ulnar Deviation: 5/5  Strength LUE  L Shoulder Flexion: 4+/5  L Shoulder ABduction: 4+/5  L Shoulder Internal Rotation: 4+/5  L Shoulder External Rotation: 4/5  L Elbow Flexion: 5/5  L Elbow Extension: 5/5  L Forearm Pron:

## 2023-08-15 ENCOUNTER — TELEPHONE (OUTPATIENT)
Dept: FAMILY MEDICINE CLINIC | Age: 48
End: 2023-08-15

## 2023-08-15 ENCOUNTER — OFFICE VISIT (OUTPATIENT)
Dept: FAMILY MEDICINE CLINIC | Age: 48
End: 2023-08-15

## 2023-08-15 ENCOUNTER — HOSPITAL ENCOUNTER (OUTPATIENT)
Dept: PHYSICAL THERAPY | Age: 48
Setting detail: THERAPIES SERIES
Discharge: HOME | End: 2023-08-15
Payer: MEDICAID

## 2023-08-15 VITALS
BODY MASS INDEX: 28.73 KG/M2 | SYSTOLIC BLOOD PRESSURE: 138 MMHG | OXYGEN SATURATION: 90 % | TEMPERATURE: 97 F | HEART RATE: 77 BPM | HEIGHT: 69 IN | WEIGHT: 194 LBS | DIASTOLIC BLOOD PRESSURE: 88 MMHG

## 2023-08-15 DIAGNOSIS — F40.232 FEAR OF OTHER MEDICAL CARE: Primary | ICD-10-CM

## 2023-08-15 DIAGNOSIS — M54.50 PAIN IN LEFT LUMBAR REGION OF BACK: Primary | ICD-10-CM

## 2023-08-15 PROCEDURE — 97110 THERAPEUTIC EXERCISES: CPT

## 2023-08-15 PROCEDURE — 97140 MANUAL THERAPY 1/> REGIONS: CPT

## 2023-08-15 RX ORDER — KETOROLAC TROMETHAMINE 30 MG/ML
30 INJECTION, SOLUTION INTRAMUSCULAR; INTRAVENOUS ONCE
Status: COMPLETED | OUTPATIENT
Start: 2023-08-15 | End: 2023-08-15

## 2023-08-15 RX ORDER — DIAZEPAM 5 MG/1
5 TABLET ORAL ONCE
Qty: 1 TABLET | Refills: 0 | Status: SHIPPED | OUTPATIENT
Start: 2023-08-15 | End: 2023-08-15

## 2023-08-15 RX ORDER — KETOROLAC TROMETHAMINE 30 MG/ML
30 INJECTION, SOLUTION INTRAMUSCULAR; INTRAVENOUS ONCE
Status: SHIPPED | OUTPATIENT
Start: 2023-08-15 | End: 2023-08-20

## 2023-08-15 RX ADMIN — KETOROLAC TROMETHAMINE 30 MG: 30 INJECTION, SOLUTION INTRAMUSCULAR; INTRAVENOUS at 11:07

## 2023-08-15 ASSESSMENT — PAIN DESCRIPTION - ORIENTATION: ORIENTATION: MID

## 2023-08-15 ASSESSMENT — PAIN DESCRIPTION - PAIN TYPE: TYPE: CHRONIC PAIN

## 2023-08-15 ASSESSMENT — PAIN DESCRIPTION - LOCATION: LOCATION: BACK

## 2023-08-15 ASSESSMENT — PAIN SCALES - GENERAL: PAINLEVEL_OUTOF10: 6

## 2023-08-15 ASSESSMENT — PAIN DESCRIPTION - DESCRIPTORS: DESCRIPTORS: SHARP;DULL;ACHING

## 2023-08-15 NOTE — TELEPHONE ENCOUNTER
Sent her a Valium to [employer]. She can not drive on that medication,  take it 1 hour prior to her appointment.

## 2023-08-15 NOTE — PROGRESS NOTES
[facility]  Outpatient Physical Therapy    Treatment Note        Date: 8/15/2023  Patient: Nancie Baker  : 1975   Confirmed: Yes  MRN: [id number]  Referring Provider: Elner Goltz, APRN - CNP   Secondary Referring Provider (If applicable):     Medical Diagnosis: Acute bilateral thoracic back pain [M54.6]  Lumbar back pain [M54.50]    Treatment Diagnosis: decreased strength, decreased back flexibility, back pain    Visit Information:  Insurance: Payor: Mayela Barba / Plan: Elvis Knight / Product Type: *No Product type* /   PT Visit Information  Onset Date: 23  PT Insurance Information: [employer] # of Visits Approved: 30  Total # of Visits to Date: 9  No Show: 0  Canceled Appointment: 1  Progress Note Counter:     Subjective Information:  Subjective: Pt stated she went to the chiroprator yesterday and urget care today for anti inflammation injection. Pt noted she might have over done it over the weekend with daily activities.   HEP Compliance:  [x] Good [] Fair [] Poor [] Reports not doing due to:    Pain Screening  Patient Currently in Pain: Yes  Pain Assessment: 0-10  Pain Level: 6  Pain Type: Chronic pain  Pain Location: Back  Pain Orientation: Mid  Pain Descriptors: Lindy Shahab, Aching    Treatment:  Exercises:  Exercises  Exercise 11: posture exs: shrugs, rolls, retraction x10 - held rolls d/t increase pain  Exercise 12: thoracic open book 5sec/ 10  Exercise 15: serratus punch supine x10  Exercise 17: barrel stretch 5 sec hold x 10  Exercise 18: cat/ cow x10  Exercise 19: quadruped DLS UE/ LE x 5  Exercise 20: HEP: thoracic open book       Manual:   Manual Therapy  Soft Tissue Mobilizaton: STM periscap mm  Treatment Reasoning  Limitations addressed: Tissue extensibility    Modalities:  Moist Heat (CPT 49775)  Patient Position: Seated  Number Minutes Moist Heat: 10  Moist heat location: Left, Right, Low back,

## 2023-08-15 NOTE — TELEPHONE ENCOUNTER
PT has dentist appt on 8/17 for root kaitlynn. Would like to get something to calm her.   Please let PT know if approved of denied

## 2023-08-21 ENCOUNTER — HOSPITAL ENCOUNTER (OUTPATIENT)
Dept: PHYSICAL THERAPY | Age: 48
Setting detail: THERAPIES SERIES
Discharge: HOME | End: 2023-08-21
Payer: MEDICAID

## 2023-08-21 PROCEDURE — G0283 ELEC STIM OTHER THAN WOUND: HCPCS

## 2023-08-21 PROCEDURE — 97140 MANUAL THERAPY 1/> REGIONS: CPT

## 2023-08-21 PROCEDURE — 97110 THERAPEUTIC EXERCISES: CPT

## 2023-08-21 ASSESSMENT — PAIN DESCRIPTION - PAIN TYPE: TYPE: CHRONIC PAIN

## 2023-08-21 ASSESSMENT — PAIN DESCRIPTION - DESCRIPTORS: DESCRIPTORS: ACHING;TIGHTNESS;SHARP

## 2023-08-21 ASSESSMENT — PAIN DESCRIPTION - ORIENTATION: ORIENTATION: MID;LOWER

## 2023-08-21 ASSESSMENT — PAIN DESCRIPTION - LOCATION: LOCATION: BACK

## 2023-08-21 ASSESSMENT — PAIN SCALES - GENERAL: PAINLEVEL_OUTOF10: 7

## 2023-08-21 NOTE — PROGRESS NOTES
[facility]  Outpatient Physical Therapy    Treatment Note        Date: 2023  Patient: Chaparrita Jimenez  : 1975   Confirmed: Yes  MRN: [id number]  Referring Provider: NATASHA Fox CNP   Secondary Referring Provider (If applicable):     Medical Diagnosis: Acute bilateral thoracic back pain [M54.6]  Lumbar back pain [M54.50]    Treatment Diagnosis: decreased strength, decreased back flexibility, back pain    Visit Information:  Insurance: Payor: Jaiden Angel / Plan: Brittany Titus / Product Type: *No Product type* /   PT Visit Information  Onset Date: 23  PT Insurance Information: [employer] # of Visits Approved: 30  Total # of Visits to Date: 10  No Show: 0  Canceled Appointment: 1  Progress Note Counter: 3/8    Subjective Information:  Subjective: Pt reported she is not  where she was hoping she would be right now. Pt reported pain with walking, sitting, and performing daily activity. Pt is wondering if the chiropractor is making things worse. HEP Compliance:  [x] Good [] Fair [] Poor [] Reports not doing due to:    Pain Screening  Patient Currently in Pain: Yes  Pain Assessment: 0-10  Pain Level: 7  Pain Type: Chronic pain  Pain Location: Back  Pain Orientation: Mid, Lower  Pain Descriptors: Aching, Tightness, Sharp    Treatment:  Exercises:  Exercises  Exercise 2: Web Slide 3 way rows YTB x 10 ea  Exercise 10: 4-way hip no resistance x 10  Exercise 11: posture exs: shrugs, rolls, retraction x10 - held rolls d/t increase pain  Exercise 12: thoracic open book 5sec/ 10  Exercise 14: wall push up plus x10  Exercise 17: barrel stretch 5 sec hold x 10  Exercise 18: cat/ cow x10  Exercise 19: quadruped DLS UE/ LE x 10  Exercise 20: HEP: thoracic open book  Treatment Reasoning  Limitations addressed:  Mobility, Strength    Manual:   Manual Therapy  Soft Tissue Mobilizaton: STM periscap mm  Treatment Reasoning  Limitations

## 2023-08-23 ENCOUNTER — HOSPITAL ENCOUNTER (OUTPATIENT)
Dept: PHYSICAL THERAPY | Age: 48
Setting detail: THERAPIES SERIES
Discharge: HOME | End: 2023-08-23
Payer: MEDICAID

## 2023-08-23 PROCEDURE — 97110 THERAPEUTIC EXERCISES: CPT

## 2023-08-23 ASSESSMENT — PAIN DESCRIPTION - DESCRIPTORS: DESCRIPTORS: ACHING;TIGHTNESS;SHARP

## 2023-08-23 ASSESSMENT — PAIN DESCRIPTION - PAIN TYPE: TYPE: CHRONIC PAIN

## 2023-08-23 ASSESSMENT — PAIN DESCRIPTION - ORIENTATION: ORIENTATION: LOWER;MID

## 2023-08-23 ASSESSMENT — PAIN DESCRIPTION - LOCATION: LOCATION: BACK

## 2023-08-23 ASSESSMENT — PAIN SCALES - GENERAL: PAINLEVEL_OUTOF10: 5

## 2023-08-23 NOTE — PROGRESS NOTES
[facility]  Outpatient Physical Therapy    Treatment Note        Date: 2023  Patient: Ayanna Gonzalez  : 1975   Confirmed: Yes  MRN: [id number]  Referring Provider: Tresea Spurling, APRN - CNP   Secondary Referring Provider (If applicable):     Medical Diagnosis: Acute bilateral thoracic back pain [M54.6]  Lumbar back pain [M54.50]    Treatment Diagnosis: decreased strength, decreased back flexibility, back pain    Visit Information:  Insurance: Payor: Moisés Knife / Plan: Rudy Rocha / Product Type: *No Product type* /   PT Visit Information  Onset Date: 23  PT Insurance Information: [employer] # of Visits Approved: 30  Total # of Visits to Date:   No Show: 0  Canceled Appointment: 1  Progress Note Counter:     Subjective Information:  Subjective: Pt reports going to chiropractor yesterday and feeling \"tad better today\". HEP Compliance:  [x] Good [] Fair [] Poor [] Reports not doing due to:    Pain Screening  Patient Currently in Pain: Yes  Pain Assessment: 0-10  Pain Level: 5  Pain Type: Chronic pain  Pain Location: Back  Pain Orientation: Lower, Mid  Pain Descriptors: Aching, Tightness, Sharp    Treatment:  Exercises:  Exercises  Exercise 12: thoracic open book 5sec/ 10  Exercise 13: modified plank at wall 2x30 sec  Exercise 14: wall push up plus x10  Exercise 15: serratus punch supine x10  Exercise 16: prone scap over Pball 3-way x10  Exercise 17: barrel stretch 5 sec hold x 10  Exercise 18: cat/ cow x10  Exercise 19: quadruped DLS: bird dog 5 NQD/64, hip abd x10  Exercise 20: HEP: thoracic open book  Treatment Reasoning  Limitations addressed: Mobility, Strength      *Indicates exercise, modality, or manual techniques to be initiated when appropriate    Objective Measures:      Strength: [x] NT  [] MMT completed:     ROM: [x] NT  [] ROM measurements:     Assessment:    Body Structures, Functions, Activity

## 2023-08-26 NOTE — TELEPHONE ENCOUNTER
Pharmacy requesting medication refill.  Please approve or deny this request.    Rx requested:  Requested Prescriptions     Pending Prescriptions Disp Refills    topiramate (TOPAMAX) 50 MG tablet [Pharmacy Med Name: TOPIRAMATE 50 MG TABLET] 60 tablet 5     Sig: take 1 tablet by mouth twice a day         Last Office Visit:   5/9/2023      Next Visit Date:  Future Appointments   Date Time Provider [facility] [address]   8/29/2023  9:30 AM MLOZ VERMILION PT [facility]OVER ONE [facility]LOZ VM PT [address] [email or web address]   8/31/2023 11:00 AM MLOZ VERMILION  [facility] VM PT [email or web address]   9/7/2023  9:30 AM MLOZ [facility] PT [facility] TWO [facility] VM PT [address] [email or web address]   9/11/2023  9:30 AM Parker Perales, [address] VM PT [address] [email or web address]   11/9/2023  2:30 PM 15 Yvette Fry

## 2023-08-27 ASSESSMENT — ENCOUNTER SYMPTOMS: BACK PAIN: 1

## 2023-08-28 RX ORDER — TOPIRAMATE 50 MG/1
TABLET, FILM COATED ORAL
Qty: 60 TABLET | Refills: 5 | Status: SHIPPED | OUTPATIENT
Start: 2023-08-28

## 2023-08-29 ENCOUNTER — OFFICE VISIT (OUTPATIENT)
Dept: ORTHOPEDIC SURGERY | Age: 48
End: 2023-08-29
Payer: MEDICAID

## 2023-08-29 ENCOUNTER — HOSPITAL ENCOUNTER (OUTPATIENT)
Dept: PHYSICAL THERAPY | Age: 48
Setting detail: THERAPIES SERIES
Discharge: HOME | End: 2023-08-29
Payer: MEDICAID

## 2023-08-29 VITALS
HEART RATE: 88 BPM | TEMPERATURE: 97.8 F | WEIGHT: 180 LBS | HEIGHT: 69 IN | BODY MASS INDEX: 26.66 KG/M2 | OXYGEN SATURATION: 99 %

## 2023-08-29 DIAGNOSIS — M77.01 MEDIAL EPICONDYLITIS OF RIGHT ELBOW: ICD-10-CM

## 2023-08-29 DIAGNOSIS — S43.402S SPRAIN OF LEFT SHOULDER, UNSPECIFIED SHOULDER SPRAIN TYPE, SEQUELA: ICD-10-CM

## 2023-08-29 DIAGNOSIS — S53.105A CLOSED DISLOCATION OF LEFT ELBOW, INITIAL ENCOUNTER: ICD-10-CM

## 2023-08-29 DIAGNOSIS — M47.814 THORACIC ARTHRITIS: Primary | ICD-10-CM

## 2023-08-29 PROCEDURE — G8427 DOCREV CUR MEDS BY ELIG CLIN: HCPCS | Performed by: PHYSICIAN ASSISTANT

## 2023-08-29 PROCEDURE — 4004F PT TOBACCO SCREEN RCVD TLK: CPT | Performed by: PHYSICIAN ASSISTANT

## 2023-08-29 PROCEDURE — G8419 CALC BMI OUT NRM PARAM NOF/U: HCPCS | Performed by: PHYSICIAN ASSISTANT

## 2023-08-29 PROCEDURE — 99214 OFFICE O/P EST MOD 30 MIN: CPT | Performed by: PHYSICIAN ASSISTANT

## 2023-08-29 RX ORDER — MELOXICAM 15 MG/1
15 TABLET ORAL DAILY
Qty: 45 TABLET | Refills: 0 | Status: SHIPPED | OUTPATIENT
Start: 2023-08-29 | End: 2023-10-13

## 2023-08-29 NOTE — PROGRESS NOTES
[facility]      Subjective:      No chief complaint on file. HPI: Andie Brown is a 49 y.o. female who is here for a left shoulder follow-up after a left elbow dislocation and reduction by Dr. Florencio Jarvis which was ~6 months ago. Her elbow is doing very well, she has full extension and no pain at the left elbow. After the injection and therapy the left shoulder is feeling much better. She has great motion, no pain. The right elbow medial epicondylitis is improving, and has good days and bad. She is happy with the trajectory of this issue. Now her complaint is more solid thoracic/lumbar area. She is having increasing pain at the thoracic area. She sees a chiropractor for this. Cracking does not help however acupuncture does. She is not taking any anti-inflammatories. Therapy has not been initiated for this issue as they have been concentrating on the left shoulder. Past Medical History:   Diagnosis Date    Depression       Past Surgical History:   Procedure Laterality Date    BREAST ENHANCEMENT SURGERY       Social History     Socioeconomic History    Marital status: Single     Spouse name: Not on file    Number of children: Not on file    Years of education: Not on file    Highest education level: Not on file   Occupational History    Not on file   Tobacco Use    Smoking status: Every Day     Packs/day: 1.00     Types: Cigarettes    Smokeless tobacco: Never   Vaping Use    Vaping Use: Never used   Substance and Sexual Activity    Alcohol use:  Yes     Alcohol/week: 4.0 standard drinks     Types: 4 Glasses of wine per week    Drug use: No    Sexual activity: Not on file   Other Topics Concern    Not on file   Social History Narrative    Not on file     Social Determinants of Health     Financial Resource Strain: Low Risk     Difficulty of Paying Living Expenses: Not hard at all   Food Insecurity: No Food Insecurity    Worried About Mari in the

## 2023-08-29 NOTE — PROGRESS NOTES
Therapy                            Cancellation/No-show Note    Date: 2023  Patient: Ayanna Gonzalez (89 y.o. female)  : 1975  MRN:  [id number]  Referring Physician: Tresea Spurling, APRN - CNP    Medical Diagnosis: Acute bilateral thoracic back pain [M54.6]  Lumbar back pain [M54.50]      Visit Information:  Insurance: Payor: Curiel Knife / Plan: Rudy Feller / Product Type: *No Product type* /   Visits to Date:    No Show/Cancelled Appts: 0 / 2      For today's appointment patient:  [x]  Cancelled  []  Rescheduled appointment  []  No-show   []  Called pt to remind of next appointment     Reason given by patient:  []  Patient ill  [x]  Conflicting appointment  []  No transportation    []  Conflict with work  []  No reason given  []  Other:      [x] Pt has future appointments scheduled, no follow up needed  [] Pt requests to be on hold.     Reason:   If > 2 weeks please discuss with therapist.  [] Therapist to call pt for follow up     Comments:       Signature: Electronically signed by Mike Jacobsen PTA on 23 at 9:48 AM EDT

## 2023-08-31 ENCOUNTER — HOSPITAL ENCOUNTER (OUTPATIENT)
Dept: PHYSICAL THERAPY | Age: 48
Setting detail: THERAPIES SERIES
Discharge: HOME | End: 2023-08-31
Payer: MEDICAID

## 2023-08-31 PROCEDURE — 97110 THERAPEUTIC EXERCISES: CPT

## 2023-08-31 ASSESSMENT — PAIN DESCRIPTION - DESCRIPTORS: DESCRIPTORS: ACHING;TIGHTNESS;SPASM

## 2023-08-31 ASSESSMENT — PAIN DESCRIPTION - ORIENTATION: ORIENTATION: LEFT;LOWER

## 2023-08-31 ASSESSMENT — PAIN SCALES - GENERAL: PAINLEVEL_OUTOF10: 5

## 2023-08-31 ASSESSMENT — PAIN DESCRIPTION - PAIN TYPE: TYPE: CHRONIC PAIN

## 2023-08-31 ASSESSMENT — PAIN DESCRIPTION - LOCATION: LOCATION: BACK

## 2023-08-31 NOTE — PROGRESS NOTES
[facility]  Outpatient Physical Therapy    Treatment Note        Date: 2023  Patient: Antoinette Dumont  : 1975   Confirmed: Yes  MRN: [id number]  Referring Provider: NATASHA Mora CNP   Secondary Referring Provider (If applicable):     Medical Diagnosis: Acute bilateral thoracic back pain [M54.6]  Lumbar back pain [M54.50]    Treatment Diagnosis: decreased strength, decreased back flexibility, back pain    Visit Information:  Insurance: Payor: Luz Maria Tadeo / Plan: Byron Rooney / Product Type: *No Product type* /   PT Visit Information  Onset Date: 23  PT Insurance Information: [employer] # of Visits Approved: 30  Total # of Visits to Date:   No Show: 0  Canceled Appointment: 2  Progress Note Counter:     Subjective Information:  Subjective: Pt noted she saw her ortho doctor and he put in new orders for the L hip. Pt also noted she changed chiropractors and has been having a better experience with good pain relief. Pt is concern she is going backwards with her treatment instead of getting better.   HEP Compliance:  [x] Good [] Fair [] Poor [] Reports not doing due to:    Pain Screening  Patient Currently in Pain: Yes  Pain Assessment: 0-10  Pain Level: 5  Pain Type: Chronic pain  Pain Location: Back  Pain Orientation: Left, Lower  Pain Descriptors: Aching, Tightness, Spasm    Treatment:  Exercises:  Exercises  Exercise 2: Web Slide 3 way rows YTB x 10 ea  Exercise 10: 4-way hip no resistance x 10  Exercise 11: posture exs: shrugs, rolls, retraction x15  Exercise 12: thoracic open book 5sec/ 10  Exercise 13: modified plank at wall 2x30 sec  Exercise 14: wall push up plus x10 - pain in the right elbow  Exercise 15: serratus punch supine x10  Exercise 16: prone scap over Pball 3-way x10 with 1#  Exercise 17: barrel stretch 5 sec hold x 10  Exercise 18: cat/ cow x10  Exercise 20: HEP: thoracic open book  Treatment

## 2023-09-07 ENCOUNTER — HOSPITAL ENCOUNTER (OUTPATIENT)
Dept: PHYSICAL THERAPY | Age: 48
Setting detail: THERAPIES SERIES
Discharge: HOME | End: 2023-09-07
Payer: MEDICAID

## 2023-09-07 PROCEDURE — 97110 THERAPEUTIC EXERCISES: CPT

## 2023-09-07 PROCEDURE — G0283 ELEC STIM OTHER THAN WOUND: HCPCS

## 2023-09-07 ASSESSMENT — PAIN SCALES - GENERAL: PAINLEVEL_OUTOF10: 5

## 2023-09-07 ASSESSMENT — PAIN DESCRIPTION - LOCATION: LOCATION: BACK;HIP

## 2023-09-07 ASSESSMENT — PAIN DESCRIPTION - PAIN TYPE: TYPE: CHRONIC PAIN

## 2023-09-07 ASSESSMENT — PAIN DESCRIPTION - ORIENTATION: ORIENTATION: LEFT;LOWER

## 2023-09-07 ASSESSMENT — PAIN DESCRIPTION - DESCRIPTORS: DESCRIPTORS: ACHING;TIGHTNESS;SPASM

## 2023-09-07 NOTE — PROGRESS NOTES
[facility]  Outpatient Physical Therapy    Treatment Note        Date: 2023  Patient: Ayanna Gonzalez  : 1975   Confirmed: Yes  MRN: [id number]  Referring Provider: Tresea Spurling, APRN - CNP   Secondary Referring Provider (If applicable):     Medical Diagnosis: Acute bilateral thoracic back pain [M54.6]  Lumbar back pain [M54.50]    Treatment Diagnosis: decreased strength, decreased back flexibility, back pain    Visit Information:  Insurance: Payor: Moisés Knife / Plan: Rudy Rocha / Product Type: *No Product type* /   PT Visit Information  Onset Date: 23  PT Insurance Information: [employer] # of Visits Approved: 30  Total # of Visits to Date:   No Show: 0  Canceled Appointment: 2  Progress Note Counter:     Subjective Information:  Subjective: Pt noted frustration of not being able to add the hip to her treatments. Pt was wondering if she should go to [facility] to have new orders added. Pt noted all her pain is in the L hip and lower lumbar region. Pt has ortho appointment for possible x-rays.   HEP Compliance:  [x] Good [] Fair [] Poor [] Reports not doing due to:    Pain Screening  Patient Currently in Pain: Yes  Pain Assessment: 0-10  Pain Level: 5  Pain Type: Chronic pain  Pain Location: Back, Hip  Pain Orientation: Left, Lower  Pain Descriptors: Aching, Tightness, Spasm    Treatment:  Exercises:  Exercises  Exercise 12: thoracic open book 5sec/ 10  Exercise 18: cat/ cow x10 - caused increased L hip  Exercise 20: HEP: thoracic open book       Modalities:  Moist Heat (CPT 24500)  Patient Position: Seated  Number Minutes Moist Heat: 10  Moist heat location: Left, Right, Low back, Thoracic  Post treatment skin assessment: Intact  Limitations addressed: Pain modulation, Tissue extensibility  Electric stimulation, unattended (CPT 69971) /  (Medicare)  Patient Position: Seated  E-stim location: Right, Left, Low

## 2023-09-08 ENCOUNTER — OFFICE VISIT (OUTPATIENT)
Dept: ORTHOPEDIC SURGERY | Age: 48
End: 2023-09-08
Payer: MEDICAID

## 2023-09-08 ENCOUNTER — HOSPITAL ENCOUNTER (OUTPATIENT)
Dept: ORTHOPEDIC SURGERY | Age: 48
End: 2023-09-08
Payer: MEDICAID

## 2023-09-08 VITALS
HEIGHT: 69 IN | DIASTOLIC BLOOD PRESSURE: 88 MMHG | SYSTOLIC BLOOD PRESSURE: 138 MMHG | WEIGHT: 180 LBS | OXYGEN SATURATION: 99 % | BODY MASS INDEX: 26.66 KG/M2 | HEART RATE: 88 BPM | TEMPERATURE: 98 F

## 2023-09-08 DIAGNOSIS — M25.552 LEFT HIP PAIN: Primary | ICD-10-CM

## 2023-09-08 DIAGNOSIS — M54.50 LUMBAR SPINE PAIN: ICD-10-CM

## 2023-09-08 DIAGNOSIS — M25.552 LEFT HIP PAIN: ICD-10-CM

## 2023-09-08 PROCEDURE — G8419 CALC BMI OUT NRM PARAM NOF/U: HCPCS | Performed by: PHYSICIAN ASSISTANT

## 2023-09-08 PROCEDURE — G8427 DOCREV CUR MEDS BY ELIG CLIN: HCPCS | Performed by: PHYSICIAN ASSISTANT

## 2023-09-08 PROCEDURE — 72100 X-RAY EXAM L-S SPINE 2/3 VWS: CPT

## 2023-09-08 PROCEDURE — 4004F PT TOBACCO SCREEN RCVD TLK: CPT | Performed by: PHYSICIAN ASSISTANT

## 2023-09-08 PROCEDURE — 99213 OFFICE O/P EST LOW 20 MIN: CPT | Performed by: PHYSICIAN ASSISTANT

## 2023-09-08 PROCEDURE — 73502 X-RAY EXAM HIP UNI 2-3 VIEWS: CPT

## 2023-09-08 NOTE — PROGRESS NOTES
she will start to feel better. Will see back in 6-8 weeks,     Orders Placed This Encounter   Procedures    XR HIP 2-3 VW W PELVIS LEFT     Standing Status:   Future     Number of Occurrences:   1     Standing Expiration Date:   9/8/2024    XR LUMBAR SPINE (2-3 VIEWS)     Standing Status:   Future     Number of Occurrences:   1     Standing Expiration Date:   9/8/2024       No orders of the defined types were placed in this encounter. No follow-ups on file.     Crystal Seaman PA-C  [facility]  [phone]

## 2023-09-11 ENCOUNTER — HOSPITAL ENCOUNTER (OUTPATIENT)
Dept: PHYSICAL THERAPY | Age: 48
Setting detail: THERAPIES SERIES
Discharge: HOME | End: 2023-09-11
Payer: MEDICAID

## 2023-09-11 PROCEDURE — 97110 THERAPEUTIC EXERCISES: CPT

## 2023-09-11 ASSESSMENT — PAIN DESCRIPTION - LOCATION: LOCATION: HIP;BACK

## 2023-09-11 ASSESSMENT — PAIN DESCRIPTION - ORIENTATION: ORIENTATION: LEFT;LOWER;POSTERIOR

## 2023-09-11 ASSESSMENT — PAIN SCALES - GENERAL: PAINLEVEL_OUTOF10: 5

## 2023-09-11 ASSESSMENT — PAIN DESCRIPTION - PAIN TYPE: TYPE: CHRONIC PAIN

## 2023-09-11 NOTE — PROGRESS NOTES
[facility]  Outpatient Physical Therapy    Treatment Note        Date: 2023  Patient: Luh Denis  : 1975   Confirmed: Yes  MRN: [id number]  Referring Provider: NATASHA Ackerman CNP   Secondary Referring Provider (If applicable):     Medical Diagnosis: Acute bilateral thoracic back pain [M54.6]  Lumbar back pain [M54.50]    Treatment Diagnosis: decreased strength, decreased back flexibility, back pain    Visit Information:  Insurance: Payor: Peggy Navas / Plan: [employer] / Product Type: *No Product type* /   PT Visit Information  Onset Date: 23  PT Insurance Information: [employer] # of Visits Approved: 30  Total # of Visits to Date:   No Show: 0  Canceled Appointment: 2  Progress Note Counter:     Subjective Information:  Subjective: Pt noted she saw her doctor with not the answers she was hoping for. Pt reported no change in symptoms or pain in the L hip and lower back region.   HEP Compliance:  [x] Good [] Fair [] Poor [] Reports not doing due to:    Pain Screening  Patient Currently in Pain: Yes  Pain Assessment: 0-10  Pain Level: 5  Pain Type: Chronic pain  Pain Location: Hip, Back  Pain Orientation: Left, Lower, Posterior  Pain Descriptors: Aching, Tightness, Spasm, Sore    Treatment:  Exercises:  Exercises  Exercise 8: Piriformis stretch seated 30\" x 3  Exercise 9: NuStep L1 x 5 min  Exercise 10: 4-way hip no resistance x 10  Exercise 12: thoracic open book 5sec/ 10  Exercise 13: modified plank at wall 2x30 sec  Exercise 14: wall push up plus x10 - pain in the right elbow  Exercise 17: barrel stretch 5 sec hold x 10  Exercise 18: cat/ cow x10 - continues to caused increased L hip  Exercise 20: HEP: thoracic open book       Manual:           Modalities:          *Indicates exercise, modality, or manual techniques to be initiated when appropriate    Objective Measures:     Strength: [x] NT  [] MMT

## 2023-10-12 ENCOUNTER — OFFICE VISIT (OUTPATIENT)
Dept: ORTHOPEDIC SURGERY | Age: 48
End: 2023-10-12
Payer: MEDICAID

## 2023-10-12 VITALS
TEMPERATURE: 98.1 F | HEART RATE: 78 BPM | BODY MASS INDEX: 26.66 KG/M2 | OXYGEN SATURATION: 98 % | HEIGHT: 69 IN | WEIGHT: 180 LBS

## 2023-10-12 DIAGNOSIS — S53.105D DISLOCATION OF LEFT ELBOW, SUBSEQUENT ENCOUNTER: ICD-10-CM

## 2023-10-12 DIAGNOSIS — M54.50 LUMBAR SPINE PAIN: ICD-10-CM

## 2023-10-12 DIAGNOSIS — S43.402S SPRAIN OF LEFT SHOULDER, UNSPECIFIED SHOULDER SPRAIN TYPE, SEQUELA: ICD-10-CM

## 2023-10-12 DIAGNOSIS — M25.552 LEFT HIP PAIN: Primary | ICD-10-CM

## 2023-10-12 PROCEDURE — G8484 FLU IMMUNIZE NO ADMIN: HCPCS | Performed by: PHYSICIAN ASSISTANT

## 2023-10-12 PROCEDURE — 99214 OFFICE O/P EST MOD 30 MIN: CPT | Performed by: PHYSICIAN ASSISTANT

## 2023-10-12 PROCEDURE — G8419 CALC BMI OUT NRM PARAM NOF/U: HCPCS | Performed by: PHYSICIAN ASSISTANT

## 2023-10-12 PROCEDURE — 4004F PT TOBACCO SCREEN RCVD TLK: CPT | Performed by: PHYSICIAN ASSISTANT

## 2023-10-12 PROCEDURE — G8427 DOCREV CUR MEDS BY ELIG CLIN: HCPCS | Performed by: PHYSICIAN ASSISTANT

## 2023-10-12 RX ORDER — PREDNISONE 50 MG/1
50 TABLET ORAL DAILY
Qty: 5 TABLET | Refills: 0 | Status: SHIPPED | OUTPATIENT
Start: 2023-10-12 | End: 2023-10-17

## 2023-10-12 RX ORDER — MELOXICAM 15 MG/1
15 TABLET ORAL DAILY
Qty: 60 TABLET | Refills: 0 | Status: SHIPPED | OUTPATIENT
Start: 2023-10-12 | End: 2023-12-11

## 2023-10-12 RX ORDER — AMOXICILLIN 500 MG/1
CAPSULE ORAL
COMMUNITY
Start: 2023-10-10

## 2023-10-12 RX ORDER — IBUPROFEN 800 MG/1
800 TABLET ORAL EVERY 8 HOURS PRN
COMMUNITY
Start: 2023-10-10

## 2023-10-12 RX ORDER — TIZANIDINE 4 MG/1
4 TABLET ORAL 3 TIMES DAILY
Qty: 30 TABLET | Refills: 0 | Status: SHIPPED | OUTPATIENT
Start: 2023-10-12 | End: 2023-10-22

## 2023-10-12 NOTE — PROGRESS NOTES
negative Speed. EXAMINATION:  MRI OF THE LEFT SHOULDER WITHOUT CONTRAST   2/3/2023 6:30 pm     TECHNIQUE:  Multiplanar multisequence MRI of the left shoulder was performed without the  administration of intravenous contrast.     COMPARISON:  None. HISTORY:  ORDERING SYSTEM PROVIDED HISTORY: Sprain of left shoulder, unspecified  shoulder sprain type, sequela  TECHNOLOGIST PROVIDED HISTORY:  What reading provider will be dictating this exam?->CRC     FINDINGS:  ROTATOR CUFF: Partial-thickness interstitial infraspinatus tear at the  footprint involving less than 50% of the tendon. Mild supraspinatus  tendinosis. Intact subscapularis and teres minor tendons. No significant  muscle edema or atrophy. BICEPS TENDON: Intact vertical and horizontal portions of the long head of  the biceps tendon. LABRUM: Degenerative tears throughout the labrum. No paralabral cyst.     GLENOHUMERAL JOINT: Physiologic amount of joint fluid. Mild chondral  thinning. No evidence of high-grade cartilage loss. Normal alignment. AC JOINT AND ACROMIOCLAVICULAR ARCH: No significant acromial downsloping or  subacromial spur. Mild to moderate degenerative changes with joint space  narrowing, marginal osteophytes, capsular hypertrophy, and pericapsular  edema. Intact coracoacromial and coracoclavicular ligaments. No significant  subacromial/subdeltoid bursitis. BONE MARROW: No evidence of fracture. Normal marrow signal.     OUTLET SPACES: Normal MRI appearance of the quadrilateral space. No  significant narrowing of the supraspinatus outlet. IMPRESSION:  Low-grade interstitial tear involving the infraspinatus at the footprint. Normal rotator cuff muscle bulk. 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 Mild glenohumeral  chondrosis.     Laboratory Studies:   Lab Results   Component Value Date    WBC 9.4 05/08/2023    HGB 13.1 05/08/2023    HCT 38.8 05/08/2023    MCV 94.4 05/08/2023     05/08/2023

## 2023-12-21 NOTE — TELEPHONE ENCOUNTER
Patient called in for refill on meloxicam to be sent to [facility] in [facility]. Please review.

## 2023-12-26 RX ORDER — MELOXICAM 15 MG/1
15 TABLET ORAL DAILY
Qty: 60 TABLET | Refills: 0 | Status: SHIPPED | OUTPATIENT
Start: 2023-12-26 | End: 2024-02-24

## 2024-02-22 RX ORDER — MELOXICAM 15 MG/1
15 TABLET ORAL DAILY
Qty: 60 TABLET | Refills: 0 | Status: SHIPPED | OUTPATIENT
Start: 2024-02-22

## 2024-03-12 RX ORDER — PREDNISONE 50 MG/1
TABLET ORAL
Qty: 5 TABLET | Refills: 0 | OUTPATIENT
Start: 2024-03-12

## 2024-04-16 DIAGNOSIS — M25.552 LEFT HIP PAIN: ICD-10-CM

## 2024-04-16 DIAGNOSIS — M47.814 THORACIC ARTHRITIS: ICD-10-CM

## 2024-04-16 DIAGNOSIS — M54.50 LUMBAR SPINE PAIN: ICD-10-CM

## 2024-04-16 DIAGNOSIS — M25.552 LEFT HIP PAIN: Primary | ICD-10-CM

## 2024-04-16 DIAGNOSIS — S53.105A CLOSED DISLOCATION OF LEFT ELBOW, INITIAL ENCOUNTER: ICD-10-CM

## 2024-04-16 DIAGNOSIS — S43.402S SPRAIN OF LEFT SHOULDER, UNSPECIFIED SHOULDER SPRAIN TYPE, SEQUELA: Primary | ICD-10-CM

## 2024-04-16 DIAGNOSIS — S53.105D DISLOCATION OF LEFT ELBOW, SUBSEQUENT ENCOUNTER: ICD-10-CM

## 2024-04-16 DIAGNOSIS — S43.402S SPRAIN OF LEFT SHOULDER, UNSPECIFIED SHOULDER SPRAIN TYPE, SEQUELA: ICD-10-CM

## 2024-04-16 NOTE — PROGRESS NOTES
External referral was printed for the patient as she is moving to [place], she request to see Dr. Ayan Stoddard at [facility]

## 2024-08-07 RX ORDER — GABAPENTIN 100 MG/1
100 CAPSULE ORAL 3 TIMES DAILY
Qty: 90 CAPSULE | Refills: 0 | OUTPATIENT
Start: 2024-08-07
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
